# Patient Record
Sex: MALE | Race: WHITE | Employment: PART TIME | URBAN - METROPOLITAN AREA
[De-identification: names, ages, dates, MRNs, and addresses within clinical notes are randomized per-mention and may not be internally consistent; named-entity substitution may affect disease eponyms.]

---

## 2019-05-31 ENCOUNTER — HOSPITAL ENCOUNTER (EMERGENCY)
Facility: HOSPITAL | Age: 19
Discharge: HOME | End: 2019-05-31
Attending: PEDIATRICS
Payer: COMMERCIAL

## 2019-05-31 ENCOUNTER — APPOINTMENT (OUTPATIENT)
Dept: ULTRASOUND IMAGING | Facility: HOSPITAL | Age: 19
End: 2019-05-31
Attending: PEDIATRICS
Payer: COMMERCIAL

## 2019-05-31 VITALS
HEIGHT: 63 IN | WEIGHT: 140 LBS | SYSTOLIC BLOOD PRESSURE: 123 MMHG | RESPIRATION RATE: 20 BRPM | TEMPERATURE: 98 F | OXYGEN SATURATION: 100 % | HEART RATE: 88 BPM | BODY MASS INDEX: 24.8 KG/M2 | DIASTOLIC BLOOD PRESSURE: 86 MMHG

## 2019-05-31 DIAGNOSIS — G57.91 NEUROPATHY OF RIGHT LOWER EXTREMITY: Primary | ICD-10-CM

## 2019-05-31 PROCEDURE — 93971 EXTREMITY STUDY: CPT | Performed by: PEDIATRICS

## 2019-05-31 PROCEDURE — 99284 EMERGENCY DEPT VISIT MOD MDM: CPT

## 2019-05-31 NOTE — ED PROVIDER NOTES
Patient Seen in: [facility] Emergency Department    History   Patient presents with:  Deep Vein Thrombosis (cardiovascular)    Stated Complaint: RLE numbness/swelling- sent for rule out DVT from IC    HPI    Patient is a 69-year-old male here with c motion. CV: Chest is clear to auscultation, no wheezes rales or rhonchi. Cardiac exam normal S1-S2, no murmurs rubs or gallops. Abdomen: Soft, nontender, nondistended. Bowel sounds present throughout. Extremities: Warm and well perfused.   Dermatologic PMD and hopefully have much better glycemic control.   He will return to the ED for worsening symptoms              Disposition and Plan     Clinical Impression:  Neuropathy of right lower extremity  (primary encounter diagnosis)    Disposition:  Discharge

## 2020-07-01 ENCOUNTER — APPOINTMENT (OUTPATIENT)
Dept: GENERAL RADIOLOGY | Facility: HOSPITAL | Age: 20
DRG: 638 | End: 2020-07-01
Attending: EMERGENCY MEDICINE
Payer: COMMERCIAL

## 2020-07-01 ENCOUNTER — HOSPITAL ENCOUNTER (INPATIENT)
Facility: HOSPITAL | Age: 20
LOS: 2 days | Discharge: HOME | DRG: 638 | End: 2020-07-03
Attending: EMERGENCY MEDICINE | Admitting: HOSPITALIST
Payer: COMMERCIAL

## 2020-07-01 DIAGNOSIS — E10.10 DKA, TYPE 1, NOT AT GOAL (HCC): ICD-10-CM

## 2020-07-01 DIAGNOSIS — E87.2 METABOLIC ACIDOSIS: ICD-10-CM

## 2020-07-01 DIAGNOSIS — N17.9 AKI (ACUTE KIDNEY INJURY) (HCC): ICD-10-CM

## 2020-07-01 DIAGNOSIS — R73.9 HYPERGLYCEMIA: ICD-10-CM

## 2020-07-01 DIAGNOSIS — E10.10 TYPE 1 DIABETES MELLITUS WITH KETOACIDOSIS WITHOUT COMA (HCC): Primary | ICD-10-CM

## 2020-07-01 PROBLEM — E87.1 HYPONATREMIA: Status: ACTIVE | Noted: 2020-07-01

## 2020-07-01 PROBLEM — E87.20 METABOLIC ACIDOSIS: Status: ACTIVE | Noted: 2020-07-01

## 2020-07-01 LAB
ALBUMIN SERPL-MCNC: 4.5 G/DL (ref 3.4–5)
ALBUMIN/GLOB SERPL: 1 {RATIO} (ref 1–2)
ALP LIVER SERPL-CCNC: 96 U/L (ref 45–117)
ALT SERPL-CCNC: 22 U/L (ref 16–61)
ANION GAP SERPL CALC-SCNC: 12 MMOL/L (ref 0–18)
ANION GAP SERPL CALC-SCNC: 20 MMOL/L (ref 0–18)
AST SERPL-CCNC: 5 U/L (ref 15–37)
BASOPHILS # BLD AUTO: 0.05 X10(3) UL (ref 0–0.2)
BASOPHILS NFR BLD AUTO: 0.6 %
BILIRUB SERPL-MCNC: 0.7 MG/DL (ref 0.1–2)
BILIRUB UR QL STRIP.AUTO: NEGATIVE
BUN BLD-MCNC: 11 MG/DL (ref 7–18)
BUN BLD-MCNC: 13 MG/DL (ref 7–18)
BUN/CREAT SERPL: 10.5 (ref 10–20)
BUN/CREAT SERPL: 9.8 (ref 10–20)
CALCIUM BLD-MCNC: 8.4 MG/DL (ref 8.5–10.1)
CALCIUM BLD-MCNC: 9.4 MG/DL (ref 8.5–10.1)
CHLORIDE SERPL-SCNC: 108 MMOL/L (ref 98–112)
CHLORIDE SERPL-SCNC: 99 MMOL/L (ref 98–112)
CLARITY UR REFRACT.AUTO: CLEAR
CO2 SERPL-SCNC: 13 MMOL/L (ref 21–32)
CO2 SERPL-SCNC: 8 MMOL/L (ref 21–32)
CREAT BLD-MCNC: 1.05 MG/DL (ref 0.7–1.3)
CREAT BLD-MCNC: 1.33 MG/DL (ref 0.7–1.3)
CREAT UR-SCNC: 35.5 MG/DL
DEPRECATED RDW RBC AUTO: 39.7 FL (ref 35.1–46.3)
EOSINOPHIL # BLD AUTO: 0.02 X10(3) UL (ref 0–0.7)
EOSINOPHIL NFR BLD AUTO: 0.2 %
ERYTHROCYTE [DISTWIDTH] IN BLOOD BY AUTOMATED COUNT: 12.5 % (ref 11–15)
EST. AVERAGE GLUCOSE BLD GHB EST-MCNC: >424 MG/DL (ref 68–126)
GLOBULIN PLAS-MCNC: 4.6 G/DL (ref 2.8–4.4)
GLUCOSE BLD-MCNC: 207 MG/DL (ref 70–99)
GLUCOSE BLD-MCNC: 224 MG/DL (ref 70–99)
GLUCOSE BLD-MCNC: 291 MG/DL (ref 70–99)
GLUCOSE BLD-MCNC: 321 MG/DL (ref 70–99)
GLUCOSE BLD-MCNC: 323 MG/DL (ref 70–99)
GLUCOSE BLD-MCNC: 392 MG/DL (ref 70–99)
GLUCOSE BLD-MCNC: 397 MG/DL (ref 70–99)
GLUCOSE UR STRIP.AUTO-MCNC: >=500 MG/DL
HBA1C MFR BLD HPLC: >16.4 % (ref ?–5.7)
HCT VFR BLD AUTO: 55.7 % (ref 39–53)
HGB BLD-MCNC: 18.5 G/DL (ref 13–17.5)
IMM GRANULOCYTES # BLD AUTO: 0.07 X10(3) UL (ref 0–1)
IMM GRANULOCYTES NFR BLD: 0.8 %
KETONES UR STRIP.AUTO-MCNC: 80 MG/DL
LEUKOCYTE ESTERASE UR QL STRIP.AUTO: NEGATIVE
LIPASE SERPL-CCNC: 62 U/L (ref 73–393)
LYMPHOCYTES # BLD AUTO: 1.14 X10(3) UL (ref 1–4)
LYMPHOCYTES NFR BLD AUTO: 13.2 %
M PROTEIN MFR SERPL ELPH: 9.1 G/DL (ref 6.4–8.2)
MCH RBC QN AUTO: 29.1 PG (ref 26–34)
MCHC RBC AUTO-ENTMCNC: 33.2 G/DL (ref 31–37)
MCV RBC AUTO: 87.7 FL (ref 80–100)
MONOCYTES # BLD AUTO: 0.8 X10(3) UL (ref 0.1–1)
MONOCYTES NFR BLD AUTO: 9.2 %
NEUTROPHILS # BLD AUTO: 6.58 X10 (3) UL (ref 1.5–7.7)
NEUTROPHILS # BLD AUTO: 6.58 X10(3) UL (ref 1.5–7.7)
NEUTROPHILS NFR BLD AUTO: 76 %
NITRITE UR QL STRIP.AUTO: NEGATIVE
OSMOLALITY SERPL CALC.SUM OF ELEC: 280 MOSM/KG (ref 275–295)
OSMOLALITY SERPL CALC.SUM OF ELEC: 288 MOSM/KG (ref 275–295)
PH UR STRIP.AUTO: 5 [PH] (ref 4.5–8)
PLATELET # BLD AUTO: 250 10(3)UL (ref 150–450)
POTASSIUM SERPL-SCNC: 4 MMOL/L (ref 3.5–5.1)
POTASSIUM SERPL-SCNC: 4.4 MMOL/L (ref 3.5–5.1)
PROT UR STRIP.AUTO-MCNC: 100 MG/DL
RBC # BLD AUTO: 6.35 X10(6)UL (ref 4.3–5.7)
RBC UR QL AUTO: NEGATIVE
SARS-COV-2 RNA RESP QL NAA+PROBE: NOT DETECTED
SODIUM SERPL-SCNC: 127 MMOL/L (ref 136–145)
SODIUM SERPL-SCNC: 133 MMOL/L (ref 136–145)
SODIUM SERPL-SCNC: 41 MMOL/L
SP GR UR STRIP.AUTO: 1.02 (ref 1–1.03)
UROBILINOGEN UR STRIP.AUTO-MCNC: <2 MG/DL
VENOUS BASE EXCESS: -13.7
VENOUS BLOOD GAS HCO3: 11.7 MEQ/L (ref 23–27)
VENOUS O2 SAT CALC: 87 % (ref 72–78)
VENOUS O2 SATURATION: 93 % (ref 72–78)
VENOUS PCO2: 27 MM HG (ref 38–50)
VENOUS PH: 7.26 (ref 7.33–7.43)
VENOUS PO2: 66 MM HG (ref 30–50)
WBC # BLD AUTO: 8.7 X10(3) UL (ref 4–11)

## 2020-07-01 PROCEDURE — 71045 X-RAY EXAM CHEST 1 VIEW: CPT | Performed by: EMERGENCY MEDICINE

## 2020-07-01 PROCEDURE — 99291 CRITICAL CARE FIRST HOUR: CPT | Performed by: NURSE PRACTITIONER

## 2020-07-01 RX ORDER — ONDANSETRON 2 MG/ML
4 INJECTION INTRAMUSCULAR; INTRAVENOUS EVERY 6 HOURS PRN
Status: DISCONTINUED | OUTPATIENT
Start: 2020-07-01 | End: 2020-07-03

## 2020-07-01 RX ORDER — INSULIN ASPART 100 [IU]/ML
0.15 INJECTION, SOLUTION INTRAVENOUS; SUBCUTANEOUS ONCE
Status: COMPLETED | OUTPATIENT
Start: 2020-07-01 | End: 2020-07-01

## 2020-07-01 RX ORDER — SODIUM CHLORIDE 9 MG/ML
INJECTION, SOLUTION INTRAVENOUS CONTINUOUS
Status: DISCONTINUED | OUTPATIENT
Start: 2020-07-01 | End: 2020-07-01

## 2020-07-01 RX ORDER — SODIUM CHLORIDE 9 MG/ML
INJECTION, SOLUTION INTRAVENOUS CONTINUOUS
Status: DISCONTINUED | OUTPATIENT
Start: 2020-07-01 | End: 2020-07-02

## 2020-07-01 RX ORDER — HYDROMORPHONE HYDROCHLORIDE 1 MG/ML
0.5 INJECTION, SOLUTION INTRAMUSCULAR; INTRAVENOUS; SUBCUTANEOUS EVERY 30 MIN PRN
Status: DISCONTINUED | OUTPATIENT
Start: 2020-07-01 | End: 2020-07-01

## 2020-07-01 RX ORDER — DEXTROSE MONOHYDRATE 25 G/50ML
50 INJECTION, SOLUTION INTRAVENOUS
Status: DISCONTINUED | OUTPATIENT
Start: 2020-07-01 | End: 2020-07-03

## 2020-07-01 RX ORDER — METOCLOPRAMIDE HYDROCHLORIDE 5 MG/ML
10 INJECTION INTRAMUSCULAR; INTRAVENOUS EVERY 8 HOURS PRN
Status: DISCONTINUED | OUTPATIENT
Start: 2020-07-01 | End: 2020-07-03

## 2020-07-01 RX ORDER — ONDANSETRON 2 MG/ML
4 INJECTION INTRAMUSCULAR; INTRAVENOUS EVERY 4 HOURS PRN
Status: DISCONTINUED | OUTPATIENT
Start: 2020-07-01 | End: 2020-07-01

## 2020-07-01 RX ORDER — ACETAMINOPHEN 325 MG/1
650 TABLET ORAL EVERY 6 HOURS PRN
Status: DISCONTINUED | OUTPATIENT
Start: 2020-07-01 | End: 2020-07-03

## 2020-07-01 NOTE — ED PROVIDER NOTES
Patient Seen in: [facility] Emergency Department      History   Patient presents with:  Abdomen/Flank Pain  Nausea/Vomiting/Diarrhea    Stated Complaint: abd pain, n/v     HPI    53-year-old type I diabetic who presents to the emergency department c icterus or conjunctival pallor: Neck is supple without tenderness on palpation. Head is atraumatic normocephalic. Oral mucosa is dry. Tongue is midline. Right upper gumline is inflamed but airway is patent. Lungs: Clear to auscultation bilaterally. DIFFERENTIAL WITH PLATELET    Narrative: The following orders were created for panel order CBC WITH DIFFERENTIAL WITH PLATELET.   Procedure                               Abnormality         Status                     --------- insulin per protocol. Laboratories were sent to assess for diabetic ketoacidosis. Chest x-ray performed to assess for any pneumonia. Chest x-ray showed no acute abnormality seen. Glucose was 392 with a sodium 127. CO2 of 8 and anion gap of 20.   Ezekiel Amen

## 2020-07-01 NOTE — ED INITIAL ASSESSMENT (HPI)
Pt stated he started having pain to right side of jaw/gum pain, pt stated he vomited last night. Pt stated he used OTC numbing medication for right gum/mouth pain.  Pt stating he does not have dental insurance and is suppose to have tooth extracted but pat

## 2020-07-02 LAB
ALBUMIN SERPL-MCNC: 3.3 G/DL (ref 3.4–5)
ALBUMIN/GLOB SERPL: 1 {RATIO} (ref 1–2)
ALP LIVER SERPL-CCNC: 63 U/L (ref 45–117)
ALT SERPL-CCNC: 17 U/L (ref 16–61)
ANION GAP SERPL CALC-SCNC: 13 MMOL/L (ref 0–18)
ANION GAP SERPL CALC-SCNC: 13 MMOL/L (ref 0–18)
ANION GAP SERPL CALC-SCNC: 14 MMOL/L (ref 0–18)
ANION GAP SERPL CALC-SCNC: 14 MMOL/L (ref 0–18)
AST SERPL-CCNC: 8 U/L (ref 15–37)
ATRIAL RATE: 119 BPM
BILIRUB SERPL-MCNC: 0.4 MG/DL (ref 0.1–2)
BUN BLD-MCNC: 11 MG/DL (ref 7–18)
BUN BLD-MCNC: 12 MG/DL (ref 7–18)
BUN BLD-MCNC: 12 MG/DL (ref 7–18)
BUN BLD-MCNC: 14 MG/DL (ref 7–18)
BUN/CREAT SERPL: 12.4 (ref 10–20)
BUN/CREAT SERPL: 12.5 (ref 10–20)
BUN/CREAT SERPL: 12.5 (ref 10–20)
BUN/CREAT SERPL: 13.9 (ref 10–20)
CALCIUM BLD-MCNC: 8.2 MG/DL (ref 8.5–10.1)
CALCIUM BLD-MCNC: 8.4 MG/DL (ref 8.5–10.1)
CALCIUM BLD-MCNC: 8.4 MG/DL (ref 8.5–10.1)
CALCIUM BLD-MCNC: 8.5 MG/DL (ref 8.5–10.1)
CHLORIDE SERPL-SCNC: 109 MMOL/L (ref 98–112)
CHLORIDE SERPL-SCNC: 111 MMOL/L (ref 98–112)
CO2 SERPL-SCNC: 14 MMOL/L (ref 21–32)
CREAT BLD-MCNC: 0.89 MG/DL (ref 0.7–1.3)
CREAT BLD-MCNC: 0.96 MG/DL (ref 0.7–1.3)
CREAT BLD-MCNC: 0.96 MG/DL (ref 0.7–1.3)
CREAT BLD-MCNC: 1.01 MG/DL (ref 0.7–1.3)
DEPRECATED RDW RBC AUTO: 39.6 FL (ref 35.1–46.3)
ERYTHROCYTE [DISTWIDTH] IN BLOOD BY AUTOMATED COUNT: 12.6 % (ref 11–15)
GLOBULIN PLAS-MCNC: 3.2 G/DL (ref 2.8–4.4)
GLUCOSE BLD-MCNC: 188 MG/DL (ref 70–99)
GLUCOSE BLD-MCNC: 227 MG/DL (ref 70–99)
GLUCOSE BLD-MCNC: 232 MG/DL (ref 70–99)
GLUCOSE BLD-MCNC: 232 MG/DL (ref 70–99)
GLUCOSE BLD-MCNC: 234 MG/DL (ref 70–99)
GLUCOSE BLD-MCNC: 247 MG/DL (ref 70–99)
GLUCOSE BLD-MCNC: 269 MG/DL (ref 70–99)
GLUCOSE BLD-MCNC: 296 MG/DL (ref 70–99)
HAV IGM SER QL: 1.9 MG/DL (ref 1.6–2.6)
HCT VFR BLD AUTO: 45.6 % (ref 39–53)
HGB BLD-MCNC: 15.3 G/DL (ref 13–17.5)
M PROTEIN MFR SERPL ELPH: 6.5 G/DL (ref 6.4–8.2)
MCH RBC QN AUTO: 29 PG (ref 26–34)
MCHC RBC AUTO-ENTMCNC: 33.6 G/DL (ref 31–37)
MCV RBC AUTO: 86.5 FL (ref 80–100)
OSMOLALITY SERPL CALC.SUM OF ELEC: 291 MOSM/KG (ref 275–295)
OSMOLALITY SERPL CALC.SUM OF ELEC: 291 MOSM/KG (ref 275–295)
OSMOLALITY SERPL CALC.SUM OF ELEC: 292 MOSM/KG (ref 275–295)
OSMOLALITY SERPL CALC.SUM OF ELEC: 294 MOSM/KG (ref 275–295)
P AXIS: 80 DEGREES
P-R INTERVAL: 124 MS
PLATELET # BLD AUTO: 207 10(3)UL (ref 150–450)
POTASSIUM SERPL-SCNC: 3.6 MMOL/L (ref 3.5–5.1)
POTASSIUM SERPL-SCNC: 3.7 MMOL/L (ref 3.5–5.1)
POTASSIUM SERPL-SCNC: 3.8 MMOL/L (ref 3.5–5.1)
Q-T INTERVAL: 320 MS
QRS DURATION: 92 MS
QTC CALCULATION (BEZET): 450 MS
R AXIS: 80 DEGREES
RBC # BLD AUTO: 5.27 X10(6)UL (ref 4.3–5.7)
SODIUM SERPL-SCNC: 136 MMOL/L (ref 136–145)
SODIUM SERPL-SCNC: 137 MMOL/L (ref 136–145)
SODIUM SERPL-SCNC: 137 MMOL/L (ref 136–145)
SODIUM SERPL-SCNC: 138 MMOL/L (ref 136–145)
T AXIS: 61 DEGREES
VENTRICULAR RATE: 119 BPM
WBC # BLD AUTO: 7.1 X10(3) UL (ref 4–11)

## 2020-07-02 PROCEDURE — 99233 SBSQ HOSP IP/OBS HIGH 50: CPT | Performed by: CLINICAL NURSE SPECIALIST

## 2020-07-02 RX ORDER — BLOOD-GLUCOSE METER
EACH MISCELLANEOUS
Qty: 1 KIT | Refills: 0 | Status: SHIPPED | OUTPATIENT
Start: 2020-07-02 | End: 2020-07-06 | Stop reason: CLARIF

## 2020-07-02 RX ORDER — LANCETS
1 EACH MISCELLANEOUS
Qty: 100 EACH | Refills: 2 | Status: SHIPPED | OUTPATIENT
Start: 2020-07-02 | End: 2020-07-06 | Stop reason: CLARIF

## 2020-07-02 RX ORDER — AMOXICILLIN AND CLAVULANATE POTASSIUM 875; 125 MG/1; MG/1
1 TABLET, FILM COATED ORAL EVERY 12 HOURS SCHEDULED
Status: DISCONTINUED | OUTPATIENT
Start: 2020-07-02 | End: 2020-07-03

## 2020-07-02 RX ORDER — POTASSIUM CHLORIDE 20 MEQ/1
40 TABLET, EXTENDED RELEASE ORAL EVERY 4 HOURS
Status: COMPLETED | OUTPATIENT
Start: 2020-07-02 | End: 2020-07-02

## 2020-07-02 RX ORDER — BLOOD-GLUCOSE METER
EACH MISCELLANEOUS
Qty: 1 DEVICE | Refills: 0 | Status: SHIPPED | OUTPATIENT
Start: 2020-07-02 | End: 2020-07-06 | Stop reason: CLARIF

## 2020-07-02 RX ORDER — BLOOD SUGAR DIAGNOSTIC
STRIP MISCELLANEOUS
Qty: 100 STRIP | Refills: 2 | Status: SHIPPED | OUTPATIENT
Start: 2020-07-02 | End: 2020-07-06 | Stop reason: CLARIF

## 2020-07-02 NOTE — PAYOR COMM NOTE
--------------  ADMISSION REVIEW     Payor: Michelle [facility]  Subscriber #:  [id number]  Authorization Number: [id number]    Admit date: 7/1/20  Admit time: 2016       Admitting Physician: Everette Sanchez MD  Attending Physician:  Everette Sanchez MD limits   LIPASE - Abnormal; Notable for the following components:    Lipase 62 (*)     All other components within normal limits   POCT GLUCOSE - Abnormal; Notable for the following components:    POC Glucose 397 (*)     All other components within normal Yes    Hyponatremia E87.1 6/2/4940 Yes    Metabolic acidosis B86.4 1/3/1883 Yes               7/2/20 -       07/02/20 0800 98.3 °F (36.8 °C) 90 17 118/67 98 % — None (Room air) — KW   07/02/20 0700 — 83 17 122/61 97 % — — — PB   07/02/20 0600 — 83 15 113/6

## 2020-07-02 NOTE — H&P
.  CC: Patient presents with:  Abdomen/Flank Pain  Nausea/Vomiting/Diarrhea       PCP: Sheryle La, MD    History of Present Illness: Patient is a 22year old male with PMH sig for DM I who is  typically on lantus and regular insulin but ran out of the la 110/60   Pulse 83   Temp 99 °F (37.2 °C)   Resp 16   Ht 162.6 cm (5' 4.02\")   Wt 122 lb 9.2 oz (55.6 kg)   SpO2 97%   BMI 21.03 kg/m²     Gen- NAD, appears stated age  HEENT- NCAT, anicteric sclera, unable to visualize affected upper molar area  Lymph- no and hilar contours are normal.  No focal consolidation. CONCLUSION:  No acute abnormality is seen. If clinical symptoms persist consider followup radiographs or CT.    Dictated by: Edward Wright MD on 7/01/2020 at 4:30 PM     Finalized by: Edward Wright MD on 7

## 2020-07-02 NOTE — CM/SW NOTE
CM spoke with bedside RN regarding dental referrals. Dental referrals have been sent to the ICU. SAULO also spoke with Dr Roseann Forbes regarding potential post discharge follow up plans. Patient currently has medical insurance but no dental insurance.  Oral surger

## 2020-07-02 NOTE — DIETARY NOTE
[facility]     Admitting diagnosis:  Type 1 diabetes mellitus with ketoacidosis without coma ([facility] [E10.10]    Ht: 162.6 cm (5' 4.02\")  Wt: 55.6 kg (122 lb 9.2 oz).  This is 94% of IBW  Body mass index is 21.03

## 2020-07-02 NOTE — CONSULTS
[facility]  Report of Consultation    Shawnee Lazcano Patient Status:  Inpatient    2000 MRN [id number]   [facility] 4SW-A Attending Muna Ward MD   Hosp Day # 1 PCP Blank Gomez MD     Reason for Consultation:  Oral pain     H tablet, Oral, Q15 Min PRN **OR** dextrose 50 % injection 50 mL, 50 mL, Intravenous, Q15 Min PRN **OR** glucose (DEX4) oral liquid 30 g, 30 g, Oral, Q15 Min PRN **OR** Glucose-Vitamin C (DEX-4) chewable tab 8 tablet, 8 tablet, Oral, Q15 Min PRN  •  Insulin follow up in the office for extraction of tooth # 2, and possibly # 1      Roland Esqueda  7/2/2020  6:51 PM

## 2020-07-02 NOTE — CONSULTS
ENDOCRINOLOGY CONSULTATION    Attending physician:  Melisa Eugene MD  Consulting physican:  Ulises Ballesteros MD    Admission Date:  7/1/2020  Consultation Date:  7/2/2020      Reason for consultation: Mgmt of Type 1 DM    Chief Complaint:  Admitted fo 875-125 MG tab 1 tablet, 1 tablet, Oral, 2 times per day  · Potassium Chloride ER (K-DUR M20) CR tab 40 mEq, 40 mEq, Oral, Q4H  · [COMPLETED] sodium chloride 0.9% IV bolus 1,000 mL, 1,000 mL, Intravenous, Once  · [COMPLETED] insulin aspart (NOVOLOG) 100 UN Drug use: Never        Family History   Problem Relation Age of Onset   • Hypertension Mother    • Hypertension Maternal Grandmother    • Diabetes Maternal Grandmother    • Cancer Maternal Grandmother    • Lipids Maternal Grandmother    • Stroke Maternal G 109   Carbon Dioxide, Total      21.0 - 32.0 mmol/L 14.0 (L) 14.0 (L) 14.0 (L) 14.0 (L)     Component      Latest Ref Rng & Units 7/1/2020 7/1/2020           8:58 PM  3:59 PM   Glucose      70 - 99 mg/dL 323 (H) 392 (H)   Sodium      136 - 145 mmol/L 133 (

## 2020-07-02 NOTE — ED NOTES
Telephone report given to [facility], RN. Report included ED summary, vitals, labs. Pt has IV access. Belongings will go with patient.  RN will escort patient to inpatient bed 464

## 2020-07-02 NOTE — CONSULTS
[facility]  Diabetes Consult Note    Bob Lubin Patient Status:  Inpatient    2000 MRN [id number]   [facility] 4SW-A Attending Akil Lugo MD   Hosp Day # 1 PCP Emily Cabral MD     Reason for Consult:     Recommendations f Assessment/Recommendations: The patient reports he was diagnosed with type 1 diabetes in 2015. It was life changing for him because he had hoped to enlist in the [employer] for a career and was told he would not be able to do so with type 1 diabetes. rather use a blood glucometer. Taught the One Touch Verio Flex and provided prescription. However, if this glucometer is not covered, provided a handout for the Relion glucometer at [place] and he stated he would try to get one.       The patient asked ab monitoring  · Patient to administer subcutaneously insulin injection with insulin pen under nursing supervision once return demonstration has verified patient's skill  · RN to reinforce survival skills:  · Provide \"Understanding Diabetes: Basic Survival S

## 2020-07-02 NOTE — PROGRESS NOTES
ICU  Critical Care APRN Progress Note    NAME: Miah Saldivar - ROOM: [address] [place] [address] - MRN: [id number] - Age: 22year old - :2000    History Of Present Illness:  Miah Saldivar is a 22year old male with PMHx significant for T1DM dx in .   Patient pr Maternal Grandmother    • Cancer Maternal Grandmother    • Lipids Maternal Grandmother    • Stroke Maternal Grandmother    • Other (atrial flutter) Maternal Grandmother    • Hypertension Maternal Grandfather    • Lipids Maternal Grandfather    • Cancer Mat consult  -Check VBG now  -Follow BMP q4h for now  -Insulin per Endo  -Continue IVF  -Accuchecks   -Check Hgb A1c  -Diabetic CNS consult and education    2. JOSUE--2/2 dehydration  -IVF  -Follow renal indices  -Check urine Na and Cr    3.   Dental infection--

## 2020-07-02 NOTE — PLAN OF CARE
Received pt from the ED A&Ox4 on RA. Saturations wnl. Upon arrival pt Sinus Tachycardia on monitor, but quickly resolved to NSR. Vitals stable. C/o of right gum/tooth pain. Refused pain meds at this time.  Pt stated had right-sided tooth pain for 2-3 days,

## 2020-07-02 NOTE — PROGRESS NOTES
Received patient alert and oriented  No complaints of pain  NSR on monitor  Lungs clear bilaterally  Oral surgery on consult for right jaw/tooth pain  Up to bathroom with standby assist  Will monitor.

## 2020-07-02 NOTE — CONSULTS
Pulmonary H&P/Consult       NAME: Mandy Benavides - ROOM: [address] [place] [address] - MRN: [id number] - Age: 22year old - :  2000    Date of Admission: 2020  3:27 PM  Admission Diagnosis: Type 1 diabetes mellitus with ketoacidosis without coma ([facility] [E10.10]  R per session: 3 or 4        Binge frequency: Never      Drug use: Never      Sexual activity: Not on file    Lifestyle      Physical activity:        Days per week: Not on file        Minutes per session: Not on file      Stress: Not on file    Relationship Glucose-Vitamin C     REVIEW OF SYSTEMS:   GENERAL:  feels well otherwise   SKIN:  denies any unusual skin lesions   EYES:  denies blurred vision or double vision   HEENT:  denies nasal congestion, sinus pain/tenderness  RESPIRATORY:  denies SOB, cough, dy carotid    bruit or JVD   Back:     Symmetric, no curvature, ROM normal, no CVA tenderness   Lungs:     Clear to auscultation bilaterally, respirations unlabored   Chest wall:    No tenderness or deformity   Heart:    Regular rate and rhythm, S1 and S2 nor

## 2020-07-03 VITALS
WEIGHT: 122.13 LBS | DIASTOLIC BLOOD PRESSURE: 64 MMHG | OXYGEN SATURATION: 98 % | TEMPERATURE: 98 F | HEIGHT: 64.02 IN | RESPIRATION RATE: 16 BRPM | SYSTOLIC BLOOD PRESSURE: 109 MMHG | BODY MASS INDEX: 20.85 KG/M2 | HEART RATE: 80 BPM

## 2020-07-03 LAB
ANION GAP SERPL CALC-SCNC: 7 MMOL/L (ref 0–18)
BUN BLD-MCNC: 10 MG/DL (ref 7–18)
BUN/CREAT SERPL: 13.3 (ref 10–20)
CALCIUM BLD-MCNC: 8.2 MG/DL (ref 8.5–10.1)
CHLORIDE SERPL-SCNC: 108 MMOL/L (ref 98–112)
CO2 SERPL-SCNC: 23 MMOL/L (ref 21–32)
CREAT BLD-MCNC: 0.75 MG/DL (ref 0.7–1.3)
GLUCOSE BLD-MCNC: 155 MG/DL (ref 70–99)
GLUCOSE BLD-MCNC: 174 MG/DL (ref 70–99)
GLUCOSE BLD-MCNC: 175 MG/DL (ref 70–99)
OSMOLALITY SERPL CALC.SUM OF ELEC: 289 MOSM/KG (ref 275–295)
POTASSIUM SERPL-SCNC: 3.3 MMOL/L (ref 3.5–5.1)
POTASSIUM SERPL-SCNC: 4.1 MMOL/L (ref 3.5–5.1)
SODIUM SERPL-SCNC: 138 MMOL/L (ref 136–145)

## 2020-07-03 PROCEDURE — 99231 SBSQ HOSP IP/OBS SF/LOW 25: CPT | Performed by: CLINICAL NURSE SPECIALIST

## 2020-07-03 RX ORDER — AMOXICILLIN AND CLAVULANATE POTASSIUM 875; 125 MG/1; MG/1
1 TABLET, FILM COATED ORAL EVERY 12 HOURS SCHEDULED
Qty: 20 TABLET | Refills: 0 | Status: SHIPPED | OUTPATIENT
Start: 2020-07-03 | End: 2020-07-03

## 2020-07-03 RX ORDER — INSULIN LISPRO 100 [IU]/ML
1 INJECTION, SOLUTION INTRAVENOUS; SUBCUTANEOUS
Qty: 1 CARTRIDGE | Refills: 0 | Status: SHIPPED | OUTPATIENT
Start: 2020-07-03 | End: 2020-07-16

## 2020-07-03 RX ORDER — FLASH GLUCOSE SENSOR
KIT MISCELLANEOUS
Qty: 1 DEVICE | Refills: 1 | Status: SHIPPED | OUTPATIENT
Start: 2020-07-03 | End: 2020-07-06 | Stop reason: CLARIF

## 2020-07-03 RX ORDER — INSULIN LISPRO 100 [IU]/ML
1 INJECTION, SOLUTION INTRAVENOUS; SUBCUTANEOUS 4 TIMES DAILY
Qty: 1 PEN | Refills: 0 | Status: SHIPPED | OUTPATIENT
Start: 2020-07-03 | End: 2020-07-03

## 2020-07-03 RX ORDER — FLASH GLUCOSE SENSOR
KIT MISCELLANEOUS
Qty: 2 EACH | Refills: 2 | Status: SHIPPED | OUTPATIENT
Start: 2020-07-03 | End: 2020-07-03

## 2020-07-03 RX ORDER — POTASSIUM CHLORIDE 20 MEQ/1
40 TABLET, EXTENDED RELEASE ORAL EVERY 4 HOURS
Status: COMPLETED | OUTPATIENT
Start: 2020-07-03 | End: 2020-07-03

## 2020-07-03 RX ORDER — INSULIN LISPRO 100 [IU]/ML
INJECTION, SOLUTION INTRAVENOUS; SUBCUTANEOUS
Qty: 1 PEN | Refills: 0 | Status: SHIPPED | OUTPATIENT
Start: 2020-07-03 | End: 2020-07-06 | Stop reason: CLARIF

## 2020-07-03 RX ORDER — AMOXICILLIN AND CLAVULANATE POTASSIUM 875; 125 MG/1; MG/1
1 TABLET, FILM COATED ORAL EVERY 12 HOURS SCHEDULED
Qty: 20 TABLET | Refills: 0 | Status: SHIPPED | OUTPATIENT
Start: 2020-07-03 | End: 2020-07-13

## 2020-07-03 RX ORDER — INSULIN LISPRO 100 [IU]/ML
1 INJECTION, SOLUTION INTRAVENOUS; SUBCUTANEOUS
Qty: 1 PEN | Refills: 1 | Status: SHIPPED | OUTPATIENT
Start: 2020-07-03 | End: 2020-07-03

## 2020-07-03 RX ORDER — FLASH GLUCOSE SENSOR
KIT MISCELLANEOUS
Qty: 2 EACH | Refills: 2 | Status: SHIPPED | OUTPATIENT
Start: 2020-07-03 | End: 2020-07-13 | Stop reason: ALTCHOICE

## 2020-07-03 RX ORDER — INSULIN LISPRO 100 [IU]/ML
1 INJECTION, SOLUTION INTRAVENOUS; SUBCUTANEOUS
Qty: 1 CARTRIDGE | Refills: 0 | Status: SHIPPED | OUTPATIENT
Start: 2020-07-03 | End: 2020-07-03

## 2020-07-03 NOTE — PROGRESS NOTES
Pulmonary Progress Note        NAME: Afshan Nicole - ROOM: 532/967-D - MRN: [id number] - Age: 22year old - : 2000        Last 24hrs: No events overnight    OBJECTIVE:   20  0400 20  0500 20  0600 20  0800   BP: 114/76 111/6

## 2020-07-03 NOTE — DISCHARGE SUMMARY
General Medicine Discharge Summary     Patient ID:  Yohan Chou  22year old  8/5/2000    Admit date: 7/1/2020    Discharge date and time: 7/3/20    Attending Physician: Susan Arvizu DO DKA.  - discussed with CM.  Seen by oral surgery, dc on augmentin and follow up next week for extraction     3) rakan- resolved     Consults: IP CONSULT TO PULMONOLOGY  IP CONSULT TO HOSPITALIST  IP CONSULT TO ENDOCRINOLOGY  IP CONSULT TO SOCIAL WORK  IP CONS of hypoglycemia; bedtime    !! ReliOn Lancets Standard 21G Does not apply Misc  1 lancet by Does not apply route TID CC and HS. !! - Potential duplicate medications found. Please discuss with provider.       CONTINUE these medications which have NOT OVALLES

## 2020-07-03 NOTE — PROGRESS NOTES
[facility]  Diabetes Follow Up      Allie Horan  [id number]       Patient seen and evaluated; states he has a headache, but is ready to go home.     Recent Labs   Lab 07/02/20  1127 07/02/20  1704 07/02/20  2112 07/03/20  0750 07/03/20  123 program has been provided during COVID-19 and registration can be done over the phone. The patient reported part of his struggle with income has to do with COVID-19 reducing his work hours.   Explained these programs are taking these circumstances into con level 2; PA required    PROVIDER F/U RECOMMENDATIONS:    · Patient's current PCP  · Endocrinologist    A total of 51 minutes were spent with the patient, 100% was spent counseling and coordinating care for uncontrolled type 1 diabetes/DKA self-management i

## 2020-07-03 NOTE — PLAN OF CARE
Received pt resting in bed alert and oriented x 4. Pt on RA. VSs wnl. Lungs clear bilaterally to auscultation. Pt denies any pain. Pt taught how to self-administer insulin. Pt correctly completes return demonstration of insulin admin.  Pt ambulates to bathr

## 2020-07-03 NOTE — PROGRESS NOTES
NURSING DISCHARGE NOTE    Discharged Home via Ambulatory. Accompanied by Family member  Belongings Taken by patient/family. Reviewed all discharge instructions with patient. Went over pricing of all insulins,glucometer with patient.   Patient voice

## 2020-07-03 NOTE — CM/SW NOTE
I got a call for this pt from Laura Menendez for script on freestyle Pao sensor system (Continuous Glucose monitoring system) for discharge.  I called pt's mother and found out pt gets his medication filled at [facility] in [place], ([phone]), thus script

## 2020-07-03 NOTE — PROGRESS NOTES
[facility]  Progress Note    Paresh Briones Patient Status:  Inpatient    2000 MRN [id number]   [facility] 4SW-A Attending Nicki Childs MD   Hosp Day # 2 PCP Olu Stiles MD       Assessment/Plan:20 yo with DM admitted for DKA Value Ref Range    Potassium 3.7 3.5 - 5.1 mmol/L   POCT GLUCOSE    Collection Time: 07/02/20  5:04 PM   Result Value Ref Range    POC Glucose 188 (H) 70 - 99 mg/dL   POCT GLUCOSE    Collection Time: 07/02/20  9:12 PM   Result Value Ref Range    POC Glucos

## 2020-07-06 ENCOUNTER — PATIENT OUTREACH (OUTPATIENT)
Dept: CASE MANAGEMENT | Age: 20
End: 2020-07-06

## 2020-07-06 ENCOUNTER — VIRTUAL PHONE E/M (OUTPATIENT)
Dept: INTERNAL MEDICINE CLINIC | Facility: CLINIC | Age: 20
End: 2020-07-06
Payer: COMMERCIAL

## 2020-07-06 DIAGNOSIS — K04.7 DENTAL INFECTION: ICD-10-CM

## 2020-07-06 DIAGNOSIS — E10.10 DKA, TYPE 1, NOT AT GOAL (HCC): Primary | ICD-10-CM

## 2020-07-06 DIAGNOSIS — N17.9 AKI (ACUTE KIDNEY INJURY) (HCC): ICD-10-CM

## 2020-07-06 DIAGNOSIS — Z02.9 ENCOUNTERS FOR UNSPECIFIED ADMINISTRATIVE PURPOSE: ICD-10-CM

## 2020-07-06 PROCEDURE — 99442 PHONE E/M BY PHYS 11-20 MIN: CPT | Performed by: CLINICAL NURSE SPECIALIST

## 2020-07-06 NOTE — PROGRESS NOTES
Virtual/Telephone Check-In  AUDIO ONLY    Yusuf Neal verbally consents to a Virtual/Telephone Check-In service on 07/06/20.   Patient understands and accepts financial responsibility for any deductible, co-insurance and/or co-pays associated with this

## 2020-07-06 NOTE — PROGRESS NOTES
Spoke with Pt to give scheduled apt times:    Future Appointments   Date Time Provider Byron Coats   7/13/2020 12:30 PM Ja Lindo MD OGDN[facility] NPV   7/16/2020  9:40 AM JAMIE Vaughn Dr. Thursday 7/09

## 2020-07-06 NOTE — PROGRESS NOTES
TRANSITIONAL CARE CLINIC PHARMACIST MEDICATION RECONCILIATION        Marlys Pak MRN [id number]    2000 PCP Etta Jeff MD       Comments: Medication history completed by the [address] [place] [address] Pharmacist with the patient on the phone. his Freestyle Apo sensor got caught on his sleeve yesterday and fell off. He states he is currently using the One Touch Verio meter and strips that were given to him in the hospital prior to discharge.   He states he will be able to  the new Frees

## 2020-07-09 NOTE — PAYOR COMM NOTE
--------------  DISCHARGE REVIEW    Payor: [address] [employer]  Subscriber #:  [id number]  Authorization Number: [id number]    Admit date: 7/1/20  Admit time:  2016  Discharge Date: 7/3/2020  4:32 PM     Admitting Physician: Jitendra Leiva MD  Primary Ca

## 2021-07-09 ENCOUNTER — HOSPITAL ENCOUNTER (EMERGENCY)
Facility: HOSPITAL | Age: 21
Discharge: HOME | End: 2021-07-10
Attending: EMERGENCY MEDICINE
Payer: COMMERCIAL

## 2021-07-09 DIAGNOSIS — R73.9 HYPERGLYCEMIA: Primary | ICD-10-CM

## 2021-07-09 LAB
BASOPHILS # BLD AUTO: 0.03 X10(3) UL (ref 0–0.2)
BASOPHILS NFR BLD AUTO: 0.5 %
DEPRECATED RDW RBC AUTO: 35.6 FL (ref 35.1–46.3)
EOSINOPHIL # BLD AUTO: 0.28 X10(3) UL (ref 0–0.7)
EOSINOPHIL NFR BLD AUTO: 4.6 %
ERYTHROCYTE [DISTWIDTH] IN BLOOD BY AUTOMATED COUNT: 11.7 % (ref 11–15)
GLUCOSE BLD-MCNC: 406 MG/DL (ref 70–99)
HCT VFR BLD AUTO: 45.2 %
HGB BLD-MCNC: 16.1 G/DL
IMM GRANULOCYTES # BLD AUTO: 0.01 X10(3) UL (ref 0–1)
IMM GRANULOCYTES NFR BLD: 0.2 %
LYMPHOCYTES # BLD AUTO: 2.34 X10(3) UL (ref 1–4)
LYMPHOCYTES NFR BLD AUTO: 38.6 %
MCH RBC QN AUTO: 30.3 PG (ref 26–34)
MCHC RBC AUTO-ENTMCNC: 35.6 G/DL (ref 31–37)
MCV RBC AUTO: 85.1 FL
MONOCYTES # BLD AUTO: 0.46 X10(3) UL (ref 0.1–1)
MONOCYTES NFR BLD AUTO: 7.6 %
NEUTROPHILS # BLD AUTO: 2.95 X10 (3) UL (ref 1.5–7.7)
NEUTROPHILS # BLD AUTO: 2.95 X10(3) UL (ref 1.5–7.7)
NEUTROPHILS NFR BLD AUTO: 48.5 %
PLATELET # BLD AUTO: 243 10(3)UL (ref 150–450)
RBC # BLD AUTO: 5.31 X10(6)UL
VENOUS BASE EXCESS: -7.2
VENOUS BLOOD GAS HCO3: 17.2 MEQ/L (ref 23–27)
VENOUS O2 SAT CALC: 91 % (ref 72–78)
VENOUS O2 SATURATION: 91 % (ref 72–78)
VENOUS PCO2: 32 MM HG (ref 38–50)
VENOUS PH: 7.35 (ref 7.33–7.43)
VENOUS PO2: 66 MM HG (ref 30–50)
WBC # BLD AUTO: 6.1 X10(3) UL (ref 4–11)

## 2021-07-09 PROCEDURE — 82962 GLUCOSE BLOOD TEST: CPT

## 2021-07-09 PROCEDURE — 96360 HYDRATION IV INFUSION INIT: CPT

## 2021-07-09 PROCEDURE — 80053 COMPREHEN METABOLIC PANEL: CPT | Performed by: EMERGENCY MEDICINE

## 2021-07-09 PROCEDURE — 82803 BLOOD GASES ANY COMBINATION: CPT | Performed by: EMERGENCY MEDICINE

## 2021-07-09 PROCEDURE — 99284 EMERGENCY DEPT VISIT MOD MDM: CPT

## 2021-07-09 PROCEDURE — 96361 HYDRATE IV INFUSION ADD-ON: CPT

## 2021-07-09 PROCEDURE — 85025 COMPLETE CBC W/AUTO DIFF WBC: CPT | Performed by: EMERGENCY MEDICINE

## 2021-07-09 RX ORDER — INSULIN ASPART 100 [IU]/ML
0.2 INJECTION, SOLUTION INTRAVENOUS; SUBCUTANEOUS ONCE
Status: COMPLETED | OUTPATIENT
Start: 2021-07-09 | End: 2021-07-09

## 2021-07-10 VITALS
BODY MASS INDEX: 24.8 KG/M2 | DIASTOLIC BLOOD PRESSURE: 79 MMHG | SYSTOLIC BLOOD PRESSURE: 113 MMHG | WEIGHT: 140 LBS | HEIGHT: 63 IN | RESPIRATION RATE: 18 BRPM | OXYGEN SATURATION: 98 % | TEMPERATURE: 98 F | HEART RATE: 80 BPM

## 2021-07-10 LAB
ALBUMIN SERPL-MCNC: 4.2 G/DL (ref 3.4–5)
ALBUMIN/GLOB SERPL: 1.5 {RATIO} (ref 1–2)
ALP LIVER SERPL-CCNC: 71 U/L
ALT SERPL-CCNC: 22 U/L
ANION GAP SERPL CALC-SCNC: 13 MMOL/L (ref 0–18)
AST SERPL-CCNC: 18 U/L (ref 15–37)
BILIRUB SERPL-MCNC: 0.4 MG/DL (ref 0.1–2)
BUN BLD-MCNC: 19 MG/DL (ref 7–18)
BUN/CREAT SERPL: 17 (ref 10–20)
CALCIUM BLD-MCNC: 8.5 MG/DL (ref 8.5–10.1)
CHLORIDE SERPL-SCNC: 101 MMOL/L (ref 98–112)
CO2 SERPL-SCNC: 18 MMOL/L (ref 21–32)
CREAT BLD-MCNC: 1.12 MG/DL
GLOBULIN PLAS-MCNC: 2.8 G/DL (ref 2.8–4.4)
GLUCOSE BLD-MCNC: 217 MG/DL (ref 70–99)
GLUCOSE BLD-MCNC: 388 MG/DL (ref 70–99)
M PROTEIN MFR SERPL ELPH: 7 G/DL (ref 6.4–8.2)
OSMOLALITY SERPL CALC.SUM OF ELEC: 292 MOSM/KG (ref 275–295)
POTASSIUM SERPL-SCNC: 3.9 MMOL/L (ref 3.5–5.1)
SODIUM SERPL-SCNC: 132 MMOL/L (ref 136–145)

## 2021-07-10 PROCEDURE — 82962 GLUCOSE BLOOD TEST: CPT

## 2021-07-10 NOTE — ED PROVIDER NOTES
Patient Seen in: [facility] Emergency Department      History   Patient presents with:  Hyperglycemia  Nausea/Vomiting/Diarrhea    Stated Complaint: nausea/vomiting, blood sugar >400, been off insulin x 1 month due to insurance *    HPI/Subjective: BMI 24.80 kg/m²         Physical Exam   General: This a pleasant nontoxic appearing patient in no apparent distress alert and oriented ×3  HEENT: Pupils are equal reactive to light. Extra ocular motions are intact.   No scleral icterus or conjunctival pal Final result                 Please view results for these tests on the individual orders. RAINBOW DRAW LAVENDER   RAINBOW DRAW LIGHT GREEN   RAINBOW DRAW GOLD   CBC W/ DIFFERENTIAL                   MDM      The patient had laboratories that were sent.

## 2021-07-10 NOTE — CM/SW NOTE
COLETTE had phone conversation with patient regarding his insulin price. Patient initially stated that his insulin cost $476/month but later revealed that was the out of pocket price cash. With his insurance the cost is around $40/month.   I advised him to [facility]

## 2022-01-23 NOTE — CM/SW NOTE
SW received generic MDO for home assessments/ social support. Per chart review, Romana Ames CM spoke with the pt regarding dental referrals. SW received call from [address] [place] [address] this morning regarding if SW could speak with the pt.  Per SAULO, after speaking the R warm warm warm warm

## 2022-01-30 ENCOUNTER — HOSPITAL ENCOUNTER (EMERGENCY)
Facility: HOSPITAL | Age: 22
Discharge: HOME | End: 2022-01-30
Attending: EMERGENCY MEDICINE
Payer: COMMERCIAL

## 2022-01-30 ENCOUNTER — APPOINTMENT (OUTPATIENT)
Dept: GENERAL RADIOLOGY | Facility: HOSPITAL | Age: 22
End: 2022-01-30
Attending: EMERGENCY MEDICINE
Payer: COMMERCIAL

## 2022-01-30 VITALS
SYSTOLIC BLOOD PRESSURE: 118 MMHG | OXYGEN SATURATION: 97 % | HEART RATE: 90 BPM | BODY MASS INDEX: 23.39 KG/M2 | HEIGHT: 63 IN | DIASTOLIC BLOOD PRESSURE: 79 MMHG | TEMPERATURE: 98 F | RESPIRATION RATE: 11 BRPM | WEIGHT: 132 LBS

## 2022-01-30 DIAGNOSIS — E10.65 TYPE 1 DIABETES MELLITUS WITH HYPERGLYCEMIA (HCC): Primary | ICD-10-CM

## 2022-01-30 DIAGNOSIS — Z91.14 HISTORY OF MEDICATION NONCOMPLIANCE: ICD-10-CM

## 2022-01-30 LAB
ACETONE: NEGATIVE
ALBUMIN SERPL-MCNC: 3.5 G/DL (ref 3.4–5)
ALBUMIN/GLOB SERPL: 1.3 {RATIO} (ref 1–2)
ALP LIVER SERPL-CCNC: 67 U/L
ANION GAP SERPL CALC-SCNC: 9 MMOL/L (ref 0–18)
ARTERIAL PATENCY WRIST A: POSITIVE
AST SERPL-CCNC: 15 U/L (ref 15–37)
BASE EXCESS BLDA CALC-SCNC: -2.3 MMOL/L (ref ?–2)
BASOPHILS # BLD AUTO: 0.04 X10(3) UL (ref 0–0.2)
BASOPHILS NFR BLD AUTO: 0.9 %
BILIRUB SERPL-MCNC: 0.5 MG/DL (ref 0.1–2)
BILIRUB UR QL STRIP.AUTO: NEGATIVE
BODY TEMPERATURE: 98.6 F
BUN BLD-MCNC: 14 MG/DL (ref 7–18)
CA-I BLD-SCNC: 1.23 MMOL/L (ref 1.12–1.32)
CALCIUM BLD-MCNC: 8.9 MG/DL (ref 8.5–10.1)
CHLORIDE SERPL-SCNC: 104 MMOL/L (ref 98–112)
CLARITY UR REFRACT.AUTO: CLEAR
CO2 SERPL-SCNC: 25 MMOL/L (ref 21–32)
COHGB MFR BLD: 1.1 % SAT (ref 0–3)
COLOR UR AUTO: YELLOW
CREAT BLD-MCNC: 0.7 MG/DL
EOSINOPHIL # BLD AUTO: 0.3 X10(3) UL (ref 0–0.7)
EOSINOPHIL NFR BLD AUTO: 7 %
ERYTHROCYTE [DISTWIDTH] IN BLOOD BY AUTOMATED COUNT: 11.9 %
FIO2: 21 %
GLOBULIN PLAS-MCNC: 2.7 G/DL (ref 2.8–4.4)
GLUCOSE BLD-MCNC: 207 MG/DL (ref 70–99)
GLUCOSE BLD-MCNC: 309 MG/DL (ref 70–99)
GLUCOSE UR STRIP.AUTO-MCNC: >=500 MG/DL
HCO3 BLDA-SCNC: 22.8 MEQ/L (ref 22–26)
HCT VFR BLD AUTO: 41.2 %
HGB BLD-MCNC: 14.7 G/DL
HGB BLD-MCNC: 14.9 G/DL
IMM GRANULOCYTES # BLD AUTO: 0.01 X10(3) UL (ref 0–1)
IMM GRANULOCYTES NFR BLD: 0.2 %
KETONES UR STRIP.AUTO-MCNC: 80 MG/DL
LACTATE BLDA-SCNC: <1.6 MMOL/L (ref 0.5–2)
LEUKOCYTE ESTERASE UR QL STRIP.AUTO: NEGATIVE
LIPASE SERPL-CCNC: 94 U/L (ref 73–393)
LYMPHOCYTES # BLD AUTO: 1.7 X10(3) UL (ref 1–4)
LYMPHOCYTES NFR BLD AUTO: 39.5 %
MCH RBC QN AUTO: 30.6 PG (ref 26–34)
MCHC RBC AUTO-ENTMCNC: 35.7 G/DL (ref 31–37)
MCV RBC AUTO: 85.8 FL
METHGB MFR BLD: 0.6 % SAT (ref 0.4–1.5)
MONOCYTES # BLD AUTO: 0.39 X10(3) UL (ref 0.1–1)
MONOCYTES NFR BLD AUTO: 9.1 %
NEUTROPHILS # BLD AUTO: 1.86 X10 (3) UL (ref 1.5–7.7)
NEUTROPHILS # BLD AUTO: 1.86 X10(3) UL (ref 1.5–7.7)
NEUTROPHILS NFR BLD AUTO: 43.3 %
NITRITE UR QL STRIP.AUTO: NEGATIVE
OSMOLALITY SERPL CALC.SUM OF ELEC: 298 MOSM/KG (ref 275–295)
P/F RATIO: 453.8 MMHG
PCO2 BLDA: 40 MM HG (ref 35–45)
PH UR STRIP.AUTO: 6 [PH] (ref 5–8)
PLATELET # BLD AUTO: 222 10(3)UL (ref 150–450)
PO2 BLDA: 95 MM HG (ref 80–105)
POTASSIUM BLD-SCNC: 4 MMOL/L (ref 3.6–5.1)
POTASSIUM SERPL-SCNC: 4 MMOL/L (ref 3.5–5.1)
PROT SERPL-MCNC: 6.2 G/DL (ref 6.4–8.2)
PROT UR STRIP.AUTO-MCNC: NEGATIVE MG/DL
RBC # BLD AUTO: 4.8 X10(6)UL
RBC UR QL AUTO: NEGATIVE
SAO2 % BLDA FROM PO2: 97 % (ref 92–100)
SAO2 % BLDA: 96 % (ref 92–100)
SARS-COV-2 RNA RESP QL NAA+PROBE: NOT DETECTED
SODIUM BLD-SCNC: 141 MMOL/L (ref 136–144)
SODIUM SERPL-SCNC: 138 MMOL/L (ref 136–145)
SP GR UR STRIP.AUTO: >1.03 (ref 1–1.03)
UROBILINOGEN UR STRIP.AUTO-MCNC: <2 MG/DL
WBC # BLD AUTO: 4.3 X10(3) UL (ref 4–11)

## 2022-01-30 PROCEDURE — 36600 WITHDRAWAL OF ARTERIAL BLOOD: CPT | Performed by: EMERGENCY MEDICINE

## 2022-01-30 PROCEDURE — 82375 ASSAY CARBOXYHB QUANT: CPT | Performed by: EMERGENCY MEDICINE

## 2022-01-30 PROCEDURE — 85018 HEMOGLOBIN: CPT | Performed by: EMERGENCY MEDICINE

## 2022-01-30 PROCEDURE — 84132 ASSAY OF SERUM POTASSIUM: CPT | Performed by: EMERGENCY MEDICINE

## 2022-01-30 PROCEDURE — 80053 COMPREHEN METABOLIC PANEL: CPT | Performed by: EMERGENCY MEDICINE

## 2022-01-30 PROCEDURE — 81003 URINALYSIS AUTO W/O SCOPE: CPT | Performed by: EMERGENCY MEDICINE

## 2022-01-30 PROCEDURE — 71045 X-RAY EXAM CHEST 1 VIEW: CPT | Performed by: EMERGENCY MEDICINE

## 2022-01-30 PROCEDURE — 99285 EMERGENCY DEPT VISIT HI MDM: CPT

## 2022-01-30 PROCEDURE — 85025 COMPLETE CBC W/AUTO DIFF WBC: CPT | Performed by: EMERGENCY MEDICINE

## 2022-01-30 PROCEDURE — 83605 ASSAY OF LACTIC ACID: CPT | Performed by: EMERGENCY MEDICINE

## 2022-01-30 PROCEDURE — 99284 EMERGENCY DEPT VISIT MOD MDM: CPT

## 2022-01-30 PROCEDURE — 83690 ASSAY OF LIPASE: CPT | Performed by: EMERGENCY MEDICINE

## 2022-01-30 PROCEDURE — 82330 ASSAY OF CALCIUM: CPT | Performed by: EMERGENCY MEDICINE

## 2022-01-30 PROCEDURE — 82803 BLOOD GASES ANY COMBINATION: CPT | Performed by: EMERGENCY MEDICINE

## 2022-01-30 PROCEDURE — 82009 KETONE BODYS QUAL: CPT | Performed by: EMERGENCY MEDICINE

## 2022-01-30 PROCEDURE — 84295 ASSAY OF SERUM SODIUM: CPT | Performed by: EMERGENCY MEDICINE

## 2022-01-30 PROCEDURE — 96360 HYDRATION IV INFUSION INIT: CPT

## 2022-01-30 PROCEDURE — 83050 HGB METHEMOGLOBIN QUAN: CPT | Performed by: EMERGENCY MEDICINE

## 2022-01-30 PROCEDURE — 82962 GLUCOSE BLOOD TEST: CPT

## 2022-01-30 RX ORDER — SYRING-NEEDL,DISP,INSUL,0.3 ML 29 G X1/2"
SYRINGE, EMPTY DISPOSABLE MISCELLANEOUS
Qty: 1 EACH | Refills: 0 | Status: SHIPPED | OUTPATIENT
Start: 2022-01-30

## 2022-01-30 RX ORDER — INSULIN ASPART 100 [IU]/ML
0.15 INJECTION, SOLUTION INTRAVENOUS; SUBCUTANEOUS ONCE
Status: COMPLETED | OUTPATIENT
Start: 2022-01-30 | End: 2022-01-30

## 2022-01-30 RX ORDER — SODIUM CHLORIDE 9 MG/ML
1000 INJECTION, SOLUTION INTRAVENOUS ONCE
Status: COMPLETED | OUTPATIENT
Start: 2022-01-30 | End: 2022-01-30

## 2022-01-30 NOTE — ED INITIAL ASSESSMENT (HPI)
Pt presents to the ED with complaints with left sided kidney pain that started on Wednesday. Pt is DMI and has not had insulin for a year. Pt reports his blood sugar this morning was 362.

## 2022-04-17 ENCOUNTER — HOSPITAL ENCOUNTER (OUTPATIENT)
Age: 22
Discharge: HOME | End: 2022-04-17
Payer: COMMERCIAL

## 2022-04-17 VITALS
BODY MASS INDEX: 22.53 KG/M2 | OXYGEN SATURATION: 98 % | RESPIRATION RATE: 18 BRPM | HEIGHT: 64 IN | SYSTOLIC BLOOD PRESSURE: 149 MMHG | DIASTOLIC BLOOD PRESSURE: 89 MMHG | HEART RATE: 89 BPM | WEIGHT: 132 LBS | TEMPERATURE: 99 F

## 2022-04-17 DIAGNOSIS — E10.628 TYPE 1 DIABETES MELLITUS WITH OTHER SKIN COMPLICATION (HCC): ICD-10-CM

## 2022-04-17 DIAGNOSIS — L03.012 FELON OF FINGER OF LEFT HAND: Primary | ICD-10-CM

## 2022-04-17 DIAGNOSIS — R73.9 HYPERGLYCEMIA: ICD-10-CM

## 2022-04-17 LAB — GLUCOSE BLD-MCNC: 265 MG/DL (ref 70–99)

## 2022-04-17 PROCEDURE — 90471 IMMUNIZATION ADMIN: CPT | Performed by: PHYSICIAN ASSISTANT

## 2022-04-17 PROCEDURE — 99214 OFFICE O/P EST MOD 30 MIN: CPT | Performed by: PHYSICIAN ASSISTANT

## 2022-04-17 PROCEDURE — 90715 TDAP VACCINE 7 YRS/> IM: CPT | Performed by: PHYSICIAN ASSISTANT

## 2022-04-17 PROCEDURE — 82962 GLUCOSE BLOOD TEST: CPT | Performed by: PHYSICIAN ASSISTANT

## 2022-04-17 RX ORDER — CLINDAMYCIN HYDROCHLORIDE 300 MG/1
300 CAPSULE ORAL 3 TIMES DAILY
Qty: 30 CAPSULE | Refills: 0 | Status: SHIPPED | OUTPATIENT
Start: 2022-04-17 | End: 2022-04-27

## 2022-04-17 RX ORDER — INSULIN GLARGINE-YFGN 100 [IU]/ML
25 INJECTION, SOLUTION SUBCUTANEOUS NIGHTLY
Qty: 7.5 ML | Refills: 0 | Status: SHIPPED | OUTPATIENT
Start: 2022-04-17

## 2022-04-17 NOTE — ED INITIAL ASSESSMENT (HPI)
Patient is a Type I diabetic. Has been without Insulin x one month, started up again three days ago. Puncture like wound to left index finger x one month. Patient has been using a Lancet to open wound and drain pus. Today increased pain, redness & swelling. Denies fever.

## 2022-10-21 PROCEDURE — 82962 GLUCOSE BLOOD TEST: CPT

## 2022-10-21 PROCEDURE — 99284 EMERGENCY DEPT VISIT MOD MDM: CPT | Performed by: EMERGENCY MEDICINE

## 2022-10-21 PROCEDURE — 93010 ELECTROCARDIOGRAM REPORT: CPT | Performed by: EMERGENCY MEDICINE

## 2022-10-22 ENCOUNTER — HOSPITAL ENCOUNTER (EMERGENCY)
Facility: HOSPITAL | Age: 22
Discharge: HOME | End: 2022-10-22
Attending: EMERGENCY MEDICINE
Payer: COMMERCIAL

## 2022-10-22 VITALS
OXYGEN SATURATION: 98 % | RESPIRATION RATE: 16 BRPM | DIASTOLIC BLOOD PRESSURE: 85 MMHG | TEMPERATURE: 99 F | HEART RATE: 98 BPM | SYSTOLIC BLOOD PRESSURE: 115 MMHG

## 2022-10-22 DIAGNOSIS — R73.9 HYPERGLYCEMIA: Primary | ICD-10-CM

## 2022-10-22 LAB
ALBUMIN SERPL-MCNC: 4.2 G/DL (ref 3.4–5)
ALBUMIN/GLOB SERPL: 1.1 {RATIO} (ref 1–2)
ALP LIVER SERPL-CCNC: 89 U/L
ALT SERPL-CCNC: 24 U/L
ANION GAP SERPL CALC-SCNC: 14 MMOL/L (ref 0–18)
AST SERPL-CCNC: 5 U/L (ref 15–37)
BASOPHILS # BLD AUTO: 0.05 X10(3) UL (ref 0–0.2)
BASOPHILS NFR BLD AUTO: 0.6 %
BILIRUB SERPL-MCNC: 0.5 MG/DL (ref 0.1–2)
BUN BLD-MCNC: 16 MG/DL (ref 7–18)
CALCIUM BLD-MCNC: 9.9 MG/DL (ref 8.5–10.1)
CHLORIDE SERPL-SCNC: 98 MMOL/L (ref 98–112)
CO2 SERPL-SCNC: 18 MMOL/L (ref 21–32)
CREAT BLD-MCNC: 1.28 MG/DL
EOSINOPHIL # BLD AUTO: 0.27 X10(3) UL (ref 0–0.7)
EOSINOPHIL NFR BLD AUTO: 3.4 %
ERYTHROCYTE [DISTWIDTH] IN BLOOD BY AUTOMATED COUNT: 11.7 %
GFR SERPLBLD BASED ON 1.73 SQ M-ARVRAT: 80 ML/MIN/1.73M2 (ref 60–?)
GLOBULIN PLAS-MCNC: 3.9 G/DL (ref 2.8–4.4)
GLUCOSE BLD-MCNC: 358 MG/DL (ref 70–99)
GLUCOSE BLD-MCNC: 397 MG/DL (ref 70–99)
GLUCOSE BLD-MCNC: 491 MG/DL (ref 70–99)
GLUCOSE BLD-MCNC: 547 MG/DL (ref 70–99)
GLUCOSE BLD-MCNC: 570 MG/DL (ref 70–99)
HCT VFR BLD AUTO: 49.1 %
HGB BLD-MCNC: 17.5 G/DL
IMM GRANULOCYTES # BLD AUTO: 0.06 X10(3) UL (ref 0–1)
IMM GRANULOCYTES NFR BLD: 0.8 %
LYMPHOCYTES # BLD AUTO: 2.71 X10(3) UL (ref 1–4)
LYMPHOCYTES NFR BLD AUTO: 34.6 %
MCH RBC QN AUTO: 30.8 PG (ref 26–34)
MCHC RBC AUTO-ENTMCNC: 35.6 G/DL (ref 31–37)
MCV RBC AUTO: 86.3 FL
MONOCYTES # BLD AUTO: 0.84 X10(3) UL (ref 0.1–1)
MONOCYTES NFR BLD AUTO: 10.7 %
NEUTROPHILS # BLD AUTO: 3.91 X10 (3) UL (ref 1.5–7.7)
NEUTROPHILS # BLD AUTO: 3.91 X10(3) UL (ref 1.5–7.7)
NEUTROPHILS NFR BLD AUTO: 49.9 %
OSMOLALITY SERPL CALC.SUM OF ELEC: 297 MOSM/KG (ref 275–295)
PLATELET # BLD AUTO: 333 10(3)UL (ref 150–450)
POTASSIUM SERPL-SCNC: 4.3 MMOL/L (ref 3.5–5.1)
PROT SERPL-MCNC: 8.1 G/DL (ref 6.4–8.2)
RBC # BLD AUTO: 5.69 X10(6)UL
SODIUM SERPL-SCNC: 130 MMOL/L (ref 136–145)
WBC # BLD AUTO: 7.8 X10(3) UL (ref 4–11)

## 2022-10-22 PROCEDURE — 96360 HYDRATION IV INFUSION INIT: CPT | Performed by: EMERGENCY MEDICINE

## 2022-10-22 PROCEDURE — 85025 COMPLETE CBC W/AUTO DIFF WBC: CPT | Performed by: EMERGENCY MEDICINE

## 2022-10-22 PROCEDURE — 80053 COMPREHEN METABOLIC PANEL: CPT | Performed by: EMERGENCY MEDICINE

## 2022-10-22 PROCEDURE — 82962 GLUCOSE BLOOD TEST: CPT

## 2022-10-22 PROCEDURE — 93005 ELECTROCARDIOGRAM TRACING: CPT

## 2022-10-22 RX ORDER — INSULIN GLARGINE 100 [IU]/ML
INJECTION, SOLUTION SUBCUTANEOUS
Qty: 10 ML | Refills: 0 | Status: SHIPPED | OUTPATIENT
Start: 2022-10-22

## 2022-10-22 NOTE — ED INITIAL ASSESSMENT (HPI)
Patient to the ER c/o hyperglycemia. Patient is a type 1 diabetic. Patient is c/o headaches and dizziness. No n/v no diarrhea no fevers. Patient also reports heart palpitations.  547 glucose

## 2022-10-23 LAB
ATRIAL RATE: 102 BPM
P AXIS: 79 DEGREES
P-R INTERVAL: 134 MS
Q-T INTERVAL: 342 MS
QRS DURATION: 88 MS
QTC CALCULATION (BEZET): 445 MS
R AXIS: 70 DEGREES
T AXIS: 55 DEGREES
VENTRICULAR RATE: 102 BPM

## 2023-01-07 ENCOUNTER — APPOINTMENT (OUTPATIENT)
Dept: GENERAL RADIOLOGY | Age: 23
End: 2023-01-07
Attending: NURSE PRACTITIONER
Payer: COMMERCIAL

## 2023-01-07 ENCOUNTER — HOSPITAL ENCOUNTER (OUTPATIENT)
Age: 23
Discharge: HOME | End: 2023-01-07
Payer: COMMERCIAL

## 2023-01-07 VITALS
TEMPERATURE: 98 F | HEART RATE: 100 BPM | HEIGHT: 63.5 IN | DIASTOLIC BLOOD PRESSURE: 99 MMHG | SYSTOLIC BLOOD PRESSURE: 151 MMHG | OXYGEN SATURATION: 96 % | RESPIRATION RATE: 14 BRPM | BODY MASS INDEX: 23 KG/M2

## 2023-01-07 DIAGNOSIS — S69.92XA INJURY OF FINGER OF LEFT HAND, INITIAL ENCOUNTER: ICD-10-CM

## 2023-01-07 DIAGNOSIS — L03.012 CELLULITIS OF FINGER OF LEFT HAND: Primary | ICD-10-CM

## 2023-01-07 DIAGNOSIS — E10.65 UNCONTROLLED TYPE 1 DIABETES MELLITUS WITH HYPERGLYCEMIA (HCC): ICD-10-CM

## 2023-01-07 LAB
#MXD IC: 0.7 X10ˆ3/UL (ref 0.1–1)
BUN BLD-MCNC: 17 MG/DL (ref 7–18)
CHLORIDE BLD-SCNC: 101 MMOL/L (ref 98–112)
CO2 BLD-SCNC: 19 MMOL/L (ref 21–32)
CREAT BLD-MCNC: 0.8 MG/DL
GFR SERPLBLD BASED ON 1.73 SQ M-ARVRAT: 127 ML/MIN/1.73M2 (ref 60–?)
GLUCOSE BLD-MCNC: 340 MG/DL (ref 70–99)
GLUCOSE BLD-MCNC: 384 MG/DL (ref 70–99)
HCT VFR BLD AUTO: 45.9 %
HCT VFR BLD CALC: 49 %
HGB BLD-MCNC: 15.8 G/DL
ISTAT IONIZED CALCIUM FOR CHEM 8: 1.24 MMOL/L (ref 1.12–1.32)
LYMPHOCYTES # BLD AUTO: 3.2 X10ˆ3/UL (ref 1–4)
LYMPHOCYTES NFR BLD AUTO: 34.8 %
MCH RBC QN AUTO: 30 PG (ref 26–34)
MCHC RBC AUTO-ENTMCNC: 34.4 G/DL (ref 31–37)
MCV RBC AUTO: 87.3 FL (ref 80–100)
MIXED CELL %: 8.2 %
NEUTROPHILS # BLD AUTO: 5.2 X10ˆ3/UL (ref 1.5–7.7)
NEUTROPHILS NFR BLD AUTO: 57 %
PLATELET # BLD AUTO: 245 X10ˆ3/UL (ref 150–450)
POTASSIUM BLD-SCNC: 4.2 MMOL/L (ref 3.6–5.1)
RBC # BLD AUTO: 5.26 X10ˆ6/UL
SODIUM BLD-SCNC: 132 MMOL/L (ref 136–145)
WBC # BLD AUTO: 9.1 X10ˆ3/UL (ref 4–11)

## 2023-01-07 PROCEDURE — 80047 BASIC METABLC PNL IONIZED CA: CPT | Performed by: NURSE PRACTITIONER

## 2023-01-07 PROCEDURE — 82962 GLUCOSE BLOOD TEST: CPT | Performed by: NURSE PRACTITIONER

## 2023-01-07 PROCEDURE — 73140 X-RAY EXAM OF FINGER(S): CPT | Performed by: NURSE PRACTITIONER

## 2023-01-07 PROCEDURE — 99214 OFFICE O/P EST MOD 30 MIN: CPT | Performed by: NURSE PRACTITIONER

## 2023-01-07 PROCEDURE — 85025 COMPLETE CBC W/AUTO DIFF WBC: CPT | Performed by: NURSE PRACTITIONER

## 2023-01-07 RX ORDER — DOXYCYCLINE HYCLATE 100 MG/1
100 CAPSULE ORAL 2 TIMES DAILY
Qty: 14 CAPSULE | Refills: 0 | Status: SHIPPED | OUTPATIENT
Start: 2023-01-07 | End: 2023-01-14

## 2023-01-07 NOTE — DISCHARGE INSTRUCTIONS
Please take insulin as prescribed. Doxycycline for infection. Warm compresses and Tylenol. Follow closely with your primary in 3 days for wound check. ER if worse.

## 2023-01-07 NOTE — ED INITIAL ASSESSMENT (HPI)
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 Swelling and redness to tip of finger. Pain only with pressure.

## 2023-03-02 ENCOUNTER — LAB ENCOUNTER (OUTPATIENT)
Dept: LAB | Age: 23
End: 2023-03-02
Attending: INTERNAL MEDICINE
Payer: COMMERCIAL

## 2023-03-02 ENCOUNTER — TELEPHONE (OUTPATIENT)
Dept: INTERNAL MEDICINE CLINIC | Facility: CLINIC | Age: 23
End: 2023-03-02

## 2023-03-02 ENCOUNTER — OFFICE VISIT (OUTPATIENT)
Dept: INTERNAL MEDICINE CLINIC | Facility: CLINIC | Age: 23
End: 2023-03-02
Payer: COMMERCIAL

## 2023-03-02 VITALS
RESPIRATION RATE: 12 BRPM | DIASTOLIC BLOOD PRESSURE: 62 MMHG | HEART RATE: 70 BPM | HEIGHT: 63.5 IN | WEIGHT: 125.63 LBS | OXYGEN SATURATION: 98 % | BODY MASS INDEX: 21.98 KG/M2 | SYSTOLIC BLOOD PRESSURE: 118 MMHG

## 2023-03-02 DIAGNOSIS — Z13.0 SCREENING FOR BLOOD DISEASE: ICD-10-CM

## 2023-03-02 DIAGNOSIS — Z13.220 SCREENING FOR LIPID DISORDERS: ICD-10-CM

## 2023-03-02 DIAGNOSIS — Z13.29 SCREENING FOR THYROID DISORDER: ICD-10-CM

## 2023-03-02 DIAGNOSIS — E10.65 UNCONTROLLED TYPE 1 DIABETES MELLITUS WITH HYPERGLYCEMIA (HCC): ICD-10-CM

## 2023-03-02 DIAGNOSIS — E10.65 UNCONTROLLED TYPE 1 DIABETES MELLITUS WITH HYPERGLYCEMIA (HCC): Primary | ICD-10-CM

## 2023-03-02 DIAGNOSIS — Z13.228 SCREENING FOR METABOLIC DISORDER: ICD-10-CM

## 2023-03-02 DIAGNOSIS — Z00.00 LABORATORY EXAMINATION ORDERED AS PART OF A COMPLETE PHYSICAL EXAMINATION: ICD-10-CM

## 2023-03-02 LAB
ALBUMIN SERPL-MCNC: 3.9 G/DL (ref 3.4–5)
ALBUMIN/GLOB SERPL: 1.3 {RATIO} (ref 1–2)
ALP LIVER SERPL-CCNC: 94 U/L
ALT SERPL-CCNC: 18 U/L
ANION GAP SERPL CALC-SCNC: 10 MMOL/L (ref 0–18)
AST SERPL-CCNC: 5 U/L (ref 15–37)
BASOPHILS # BLD AUTO: 0.04 X10(3) UL (ref 0–0.2)
BASOPHILS NFR BLD AUTO: 0.8 %
BILIRUB SERPL-MCNC: 0.6 MG/DL (ref 0.1–2)
BUN BLD-MCNC: 14 MG/DL (ref 7–18)
CALCIUM BLD-MCNC: 8.7 MG/DL (ref 8.5–10.1)
CHLORIDE SERPL-SCNC: 104 MMOL/L (ref 98–112)
CHOLEST SERPL-MCNC: 266 MG/DL (ref ?–200)
CO2 SERPL-SCNC: 19 MMOL/L (ref 21–32)
CREAT BLD-MCNC: 0.79 MG/DL
CREAT UR-SCNC: 13.6 MG/DL
EOSINOPHIL # BLD AUTO: 0.27 X10(3) UL (ref 0–0.7)
EOSINOPHIL NFR BLD AUTO: 5.1 %
ERYTHROCYTE [DISTWIDTH] IN BLOOD BY AUTOMATED COUNT: 12.2 %
EST. AVERAGE GLUCOSE BLD GHB EST-MCNC: 415 MG/DL (ref 68–126)
FASTING PATIENT LIPID ANSWER: YES
FASTING STATUS PATIENT QL REPORTED: YES
GFR SERPLBLD BASED ON 1.73 SQ M-ARVRAT: 127 ML/MIN/1.73M2 (ref 60–?)
GLOBULIN PLAS-MCNC: 2.9 G/DL (ref 2.8–4.4)
GLUCOSE BLD-MCNC: 454 MG/DL (ref 70–99)
HBA1C MFR BLD: 16.1 % (ref ?–5.7)
HCT VFR BLD AUTO: 43 %
HDLC SERPL-MCNC: 51 MG/DL (ref 40–59)
HGB BLD-MCNC: 14.8 G/DL
IMM GRANULOCYTES # BLD AUTO: 0.01 X10(3) UL (ref 0–1)
IMM GRANULOCYTES NFR BLD: 0.2 %
LDLC SERPL CALC-MCNC: 182 MG/DL (ref ?–100)
LYMPHOCYTES # BLD AUTO: 2.26 X10(3) UL (ref 1–4)
LYMPHOCYTES NFR BLD AUTO: 42.9 %
MCH RBC QN AUTO: 30.2 PG (ref 26–34)
MCHC RBC AUTO-ENTMCNC: 34.4 G/DL (ref 31–37)
MCV RBC AUTO: 87.8 FL
MICROALBUMIN UR-MCNC: <0.5 MG/DL
MONOCYTES # BLD AUTO: 0.55 X10(3) UL (ref 0.1–1)
MONOCYTES NFR BLD AUTO: 10.4 %
NEUTROPHILS # BLD AUTO: 2.14 X10 (3) UL (ref 1.5–7.7)
NEUTROPHILS # BLD AUTO: 2.14 X10(3) UL (ref 1.5–7.7)
NEUTROPHILS NFR BLD AUTO: 40.6 %
NONHDLC SERPL-MCNC: 215 MG/DL (ref ?–130)
OSMOLALITY SERPL CALC.SUM OF ELEC: 296 MOSM/KG (ref 275–295)
PLATELET # BLD AUTO: 234 10(3)UL (ref 150–450)
POTASSIUM SERPL-SCNC: 3.6 MMOL/L (ref 3.5–5.1)
PROT SERPL-MCNC: 6.8 G/DL (ref 6.4–8.2)
RBC # BLD AUTO: 4.9 X10(6)UL
SODIUM SERPL-SCNC: 133 MMOL/L (ref 136–145)
TRIGL SERPL-MCNC: 176 MG/DL (ref 30–149)
TSI SER-ACNC: 3.18 MIU/ML (ref 0.36–3.74)
VLDLC SERPL CALC-MCNC: 36 MG/DL (ref 0–30)
WBC # BLD AUTO: 5.3 X10(3) UL (ref 4–11)

## 2023-03-02 PROCEDURE — 3078F DIAST BP <80 MM HG: CPT | Performed by: INTERNAL MEDICINE

## 2023-03-02 PROCEDURE — 3008F BODY MASS INDEX DOCD: CPT | Performed by: INTERNAL MEDICINE

## 2023-03-02 PROCEDURE — 80061 LIPID PANEL: CPT | Performed by: INTERNAL MEDICINE

## 2023-03-02 PROCEDURE — 99203 OFFICE O/P NEW LOW 30 MIN: CPT | Performed by: INTERNAL MEDICINE

## 2023-03-02 PROCEDURE — 80050 GENERAL HEALTH PANEL: CPT | Performed by: INTERNAL MEDICINE

## 2023-03-02 PROCEDURE — 83036 HEMOGLOBIN GLYCOSYLATED A1C: CPT | Performed by: INTERNAL MEDICINE

## 2023-03-02 PROCEDURE — 82570 ASSAY OF URINE CREATININE: CPT | Performed by: INTERNAL MEDICINE

## 2023-03-02 PROCEDURE — 3074F SYST BP LT 130 MM HG: CPT | Performed by: INTERNAL MEDICINE

## 2023-03-02 PROCEDURE — 3046F HEMOGLOBIN A1C LEVEL >9.0%: CPT | Performed by: INTERNAL MEDICINE

## 2023-03-02 PROCEDURE — 82043 UR ALBUMIN QUANTITATIVE: CPT | Performed by: INTERNAL MEDICINE

## 2023-03-02 RX ORDER — INSULIN GLARGINE 100 [IU]/ML
INJECTION, SOLUTION SUBCUTANEOUS
Qty: 10 ML | Refills: 0 | Status: SHIPPED | OUTPATIENT
Start: 2023-03-02

## 2023-03-02 NOTE — TELEPHONE ENCOUNTER
Spoke to Rogelio with [facility] lab. Pt's BG is 454. Routing to AD for review and recs.

## 2023-04-19 ENCOUNTER — OFFICE VISIT (OUTPATIENT)
Dept: ENDOCRINOLOGY CLINIC | Facility: CLINIC | Age: 23
End: 2023-04-19
Payer: COMMERCIAL

## 2023-04-19 ENCOUNTER — TELEPHONE (OUTPATIENT)
Dept: ENDOCRINOLOGY CLINIC | Facility: CLINIC | Age: 23
End: 2023-04-19

## 2023-04-19 VITALS
RESPIRATION RATE: 16 BRPM | HEART RATE: 68 BPM | SYSTOLIC BLOOD PRESSURE: 116 MMHG | OXYGEN SATURATION: 98 % | DIASTOLIC BLOOD PRESSURE: 74 MMHG | BODY MASS INDEX: 24 KG/M2 | WEIGHT: 135.19 LBS

## 2023-04-19 DIAGNOSIS — E78.5 DYSLIPIDEMIA: ICD-10-CM

## 2023-04-19 DIAGNOSIS — E10.65 TYPE 1 DIABETES MELLITUS WITH HYPERGLYCEMIA (HCC): Primary | ICD-10-CM

## 2023-04-19 PROBLEM — E10.10 DKA, TYPE 1, NOT AT GOAL (HCC): Status: RESOLVED | Noted: 2020-07-01 | Resolved: 2023-04-19

## 2023-04-19 PROBLEM — E10.10 TYPE 1 DIABETES MELLITUS WITH KETOACIDOSIS WITHOUT COMA (HCC): Status: RESOLVED | Noted: 2020-07-01 | Resolved: 2023-04-19

## 2023-04-19 PROBLEM — E87.20 METABOLIC ACIDOSIS: Status: RESOLVED | Noted: 2020-07-01 | Resolved: 2023-04-19

## 2023-04-19 PROBLEM — R73.9 HYPERGLYCEMIA: Status: RESOLVED | Noted: 2020-07-01 | Resolved: 2023-04-19

## 2023-04-19 PROBLEM — E87.1 HYPONATREMIA: Status: RESOLVED | Noted: 2020-07-01 | Resolved: 2023-04-19

## 2023-04-19 LAB
GLUCOSE BLOOD: 304
TEST STRIP LOT #: NORMAL NUMERIC

## 2023-04-19 PROCEDURE — 82947 ASSAY GLUCOSE BLOOD QUANT: CPT | Performed by: NURSE PRACTITIONER

## 2023-04-19 PROCEDURE — 99215 OFFICE O/P EST HI 40 MIN: CPT | Performed by: NURSE PRACTITIONER

## 2023-04-19 PROCEDURE — 3078F DIAST BP <80 MM HG: CPT | Performed by: NURSE PRACTITIONER

## 2023-04-19 PROCEDURE — 3074F SYST BP LT 130 MM HG: CPT | Performed by: NURSE PRACTITIONER

## 2023-04-19 RX ORDER — INSULIN DEGLUDEC 200 U/ML
INJECTION, SOLUTION SUBCUTANEOUS
Qty: 18 ML | Refills: 0 | Status: SHIPPED | OUTPATIENT
Start: 2023-04-19

## 2023-04-19 RX ORDER — PEN NEEDLE, DIABETIC 30 GX3/16"
5 NEEDLE, DISPOSABLE MISCELLANEOUS DAILY
Qty: 500 EACH | Refills: 1 | Status: SHIPPED | OUTPATIENT
Start: 2023-04-19

## 2023-04-19 RX ORDER — INSULIN ASPART 100 [IU]/ML
INJECTION, SOLUTION INTRAVENOUS; SUBCUTANEOUS
Qty: 45 ML | Refills: 0 | Status: SHIPPED | OUTPATIENT
Start: 2023-04-19 | End: 2023-04-24

## 2023-04-19 RX ORDER — INSULIN DEGLUDEC INJECTION 100 U/ML
INJECTION, SOLUTION SUBCUTANEOUS
Qty: 1 EACH | Refills: 0 | COMMUNITY
Start: 2023-04-19

## 2023-04-19 RX ORDER — GLUCAGON INJECTION, SOLUTION 1 MG/.2ML
1 INJECTION, SOLUTION SUBCUTANEOUS AS NEEDED
Qty: 0.4 ML | Refills: 1 | Status: SHIPPED | OUTPATIENT
Start: 2023-04-19

## 2023-04-19 RX ORDER — BLOOD-GLUCOSE SENSOR
1 EACH MISCELLANEOUS
Qty: 3 EACH | Refills: 3 | Status: SHIPPED | OUTPATIENT
Start: 2023-04-19

## 2023-04-19 RX ORDER — INSULIN ASPART INJECTION 100 [IU]/ML
INJECTION, SOLUTION SUBCUTANEOUS
Qty: 1 EACH | Refills: 0 | COMMUNITY
Start: 2023-04-19 | End: 2023-04-24

## 2023-04-19 NOTE — ASSESSMENT & PLAN NOTE
Reviewed with patient health impact associated with high glucose trends and the importance of better glucose control to prevent onset /progression of DM complications.

## 2023-04-20 ENCOUNTER — TELEPHONE (OUTPATIENT)
Dept: ENDOCRINOLOGY CLINIC | Facility: CLINIC | Age: 23
End: 2023-04-20

## 2023-04-20 NOTE — TELEPHONE ENCOUNTER
Received fax for PA on [employer] contacted pharmacy to see if PA is need or alternatives. Spoke to [place] needs PA ok to start?

## 2023-04-20 NOTE — TELEPHONE ENCOUNTER
Ok to proceed with PA  Sample of Shelbie Jobs provided at yesterday appointment so he should be ok until PA approved.       Also place my name on his care team.

## 2023-04-24 ENCOUNTER — TELEPHONE (OUTPATIENT)
Dept: ENDOCRINOLOGY CLINIC | Facility: CLINIC | Age: 23
End: 2023-04-24

## 2023-04-24 RX ORDER — INSULIN LISPRO 100 [IU]/ML
INJECTION, SOLUTION INTRAVENOUS; SUBCUTANEOUS
Qty: 45 ML | Refills: 0 | Status: SHIPPED | OUTPATIENT
Start: 2023-04-24

## 2023-04-24 NOTE — TELEPHONE ENCOUNTER
Call with patient, he is running about 170-200's during day. Fasting in am is sometimes 300's. States he gets home late so dinner is normally midnight to 1 pm. Pt reports his prandial insulin wasn't covered, but can see humalog was sent today and informed pt to let us know if there are any issues filling. Scheduled follow up.   Future Appointments   Date Time Provider Byron Pauly   5/11/2023  3:20 PM Ethel Aceves MD [facility]   5/25/2023  2:15 PM AMAYA Dunne [facility]   7/26/2023 11:30 AM Leonardo Fleming APRN [facility]

## 2023-04-24 NOTE — TELEPHONE ENCOUNTER
Pt not active on my chart       Last A1c value was 16.1% done 3/2/2023. Started pt on prandial insulin   Wanted to check in on BG trends       Last DM note:    Will pursue Dexcom G7 : w [facility] phr[facility]   Continue Lantus vial/syringe : 25 units once daily       When supply gone, start Tresiba U 200 flex touch: 25 units once daily       Restart prandial insulin: NOVOLOG/Fiasp (ASPART) :   IC ratio 1:10   ISF: 30 :correct above 160 mg/dl    Also noticed the 4 week phone check in appt not made-   pls schedule 2:15 pm on 5/25 or he can see CDE for appt in 4 weeks

## 2023-04-24 NOTE — TELEPHONE ENCOUNTER
Will see if humalog is preferred  Orders Placed This Encounter      Insulin Lispro, 1 Unit Dial, (HUMALOG KWIKPEN) 100 UNIT/ML Subcutaneous Solution Pen-injector          Sig: Uses up to 50 units daily as directed          Dispense:  45 mL          Refill:  0

## 2023-05-10 NOTE — TELEPHONE ENCOUNTER
[facility] pharmacy website says Dexcom order cancelled by patient. Called patient and LM requesting call back to see what the reason for cancelling order.

## 2023-05-11 ENCOUNTER — OFFICE VISIT (OUTPATIENT)
Dept: INTERNAL MEDICINE CLINIC | Facility: CLINIC | Age: 23
End: 2023-05-11
Payer: COMMERCIAL

## 2023-05-11 VITALS
OXYGEN SATURATION: 98 % | RESPIRATION RATE: 20 BRPM | DIASTOLIC BLOOD PRESSURE: 76 MMHG | SYSTOLIC BLOOD PRESSURE: 118 MMHG | TEMPERATURE: 97 F | BODY MASS INDEX: 25 KG/M2 | WEIGHT: 143.38 LBS | HEART RATE: 103 BPM

## 2023-05-11 DIAGNOSIS — Z00.00 ROUTINE GENERAL MEDICAL EXAMINATION AT A HEALTH CARE FACILITY: Primary | ICD-10-CM

## 2023-05-11 DIAGNOSIS — E78.2 MIXED HYPERLIPIDEMIA: ICD-10-CM

## 2023-05-11 DIAGNOSIS — E10.65 UNCONTROLLED TYPE 1 DIABETES MELLITUS WITH HYPERGLYCEMIA (HCC): ICD-10-CM

## 2023-05-11 PROCEDURE — 90472 IMMUNIZATION ADMIN EACH ADD: CPT | Performed by: INTERNAL MEDICINE

## 2023-05-11 PROCEDURE — 3074F SYST BP LT 130 MM HG: CPT | Performed by: INTERNAL MEDICINE

## 2023-05-11 PROCEDURE — 90677 PCV20 VACCINE IM: CPT | Performed by: INTERNAL MEDICINE

## 2023-05-11 PROCEDURE — 3078F DIAST BP <80 MM HG: CPT | Performed by: INTERNAL MEDICINE

## 2023-05-11 PROCEDURE — 90651 9VHPV VACCINE 2/3 DOSE IM: CPT | Performed by: INTERNAL MEDICINE

## 2023-05-11 PROCEDURE — 99395 PREV VISIT EST AGE 18-39: CPT | Performed by: INTERNAL MEDICINE

## 2023-05-11 PROCEDURE — 90471 IMMUNIZATION ADMIN: CPT | Performed by: INTERNAL MEDICINE

## 2023-05-11 RX ORDER — INSULIN GLARGINE-YFGN 100 [IU]/ML
INJECTION, SOLUTION SUBCUTANEOUS
COMMUNITY
Start: 2023-03-28

## 2023-05-25 ENCOUNTER — TELEMEDICINE (OUTPATIENT)
Dept: ENDOCRINOLOGY CLINIC | Facility: CLINIC | Age: 23
End: 2023-05-25
Payer: COMMERCIAL

## 2023-05-25 DIAGNOSIS — E10.65 TYPE 1 DIABETES MELLITUS WITH HYPERGLYCEMIA (HCC): Primary | ICD-10-CM

## 2023-05-25 PROCEDURE — 99214 OFFICE O/P EST MOD 30 MIN: CPT | Performed by: NURSE PRACTITIONER

## 2023-05-31 NOTE — TELEPHONE ENCOUNTER
LB 2nd message checking on why G7 was put on hold per patient request according to [facility] portal

## 2023-07-20 RX ORDER — INSULIN DEGLUDEC 200 U/ML
INJECTION, SOLUTION SUBCUTANEOUS
Qty: 18 ML | Refills: 0 | Status: SHIPPED | OUTPATIENT
Start: 2023-07-20

## 2023-07-20 NOTE — TELEPHONE ENCOUNTER
Requested Prescriptions     Pending Prescriptions Disp Refills    TRESIBA FLEXTOUCH 200 UNIT/ML Subcutaneous Solution Pen-injector [Pharmacy Med Name: Ofe Pinzon Flextouch 200 Unit/Ml Inj Caitlyn] 18 mL 0     Sig: Uses up to 40 units daily     Future Appointments   Date Time Provider Byron Coats   7/26/2023 11:30 AM AMAYA Dunne [facility] [place]     Last A1c value was 16.1% done 3/2/2023.   Refill 4/19/23  LOV 5/25/23

## 2023-10-10 ENCOUNTER — TELEPHONE (OUTPATIENT)
Dept: ENDOCRINOLOGY CLINIC | Facility: CLINIC | Age: 23
End: 2023-10-10

## 2023-10-10 NOTE — TELEPHONE ENCOUNTER
Last A1c value was 16.1% done 3/2/2023.       Cancelled 7-2023 appt and no f/u on file  Has not completed labs    Not active on my chart     Needs DM appt ASAP   Telehealth ok if labs completed (active orders from PCP)

## 2023-10-12 NOTE — TELEPHONE ENCOUNTER
LM for patient to call office to schedule an appointment ASAP or let  us know if he is seeing another provider for diabetes management.

## 2023-11-30 NOTE — TELEPHONE ENCOUNTER
Pt has not scheduled f/u or responded to certified warning letter from Diabetes   Last A1c value was 16.1% done 3/2/2023.

## 2023-12-11 NOTE — TELEPHONE ENCOUNTER
Requested Prescriptions     Pending Prescriptions Disp Refills    Insulin Lispro, 1 Unit Dial, (HUMALOG KWIKPEN) 100 UNIT/ML Subcutaneous Solution Pen-injector [Pharmacy Med Name: Humalog Kwikpen 100 Unit/Ml Inj Lill] 45 mL 0     Sig: USES UP TO 50 UNITS DAILY AS DIRECTED       HGBA1C:    Lab Results   Component Value Date    A1C 16.1 (H) 03/02/2023    A1C >16.4 (H) 07/01/2020    A1C 12.0 (H) 09/06/2019     (H) 03/02/2023     LOV: 5-25-23    REFILL: 4-24-23    [facility]

## 2023-12-14 RX ORDER — INSULIN LISPRO 100 [IU]/ML
INJECTION, SOLUTION INTRAVENOUS; SUBCUTANEOUS
Qty: 45 ML | Refills: 0 | OUTPATIENT
Start: 2023-12-14

## 2023-12-14 NOTE — TELEPHONE ENCOUNTER
Future Appointments   Date Time Provider Byron Coats   2/19/2024  9:00 AM Golden Anguiano MD [facility]     Which pharmacy:[place]    Prescription Refill Request - Patient advised can take 48-72 hours.       Name of Medication (strength, dose, qty requested:     Medication Quantity Refills Start End   Insulin Lispro, 1 Unit Dial, (HUMALOG KWIKPEN) 100 UNIT/ML Subcutaneous Solution Pen-injector 45 mL 0 4/24/2023 --   Sig:   Uses up to 50 units daily as directed

## 2023-12-21 NOTE — TELEPHONE ENCOUNTER
Third attempt for r/s LVMTCB     Contacted alternative number spoke to pt mom to just ask if they can contact pt to contact office

## 2023-12-28 ENCOUNTER — HOSPITAL ENCOUNTER (INPATIENT)
Facility: HOSPITAL | Age: 23
LOS: 4 days | Discharge: HOME | End: 2024-01-01
Attending: EMERGENCY MEDICINE | Admitting: HOSPITALIST
Payer: COMMERCIAL

## 2023-12-28 ENCOUNTER — APPOINTMENT (OUTPATIENT)
Dept: GENERAL RADIOLOGY | Facility: HOSPITAL | Age: 23
End: 2023-12-28
Attending: EMERGENCY MEDICINE
Payer: COMMERCIAL

## 2023-12-28 DIAGNOSIS — E10.10 TYPE 1 DIABETES MELLITUS WITH KETOACIDOSIS WITHOUT COMA (HCC): Primary | ICD-10-CM

## 2023-12-28 DIAGNOSIS — U07.1 COVID: ICD-10-CM

## 2023-12-28 PROBLEM — E87.20 METABOLIC ACIDOSIS: Status: ACTIVE | Noted: 2023-12-28

## 2023-12-28 PROBLEM — E87.1 HYPONATREMIA: Status: ACTIVE | Noted: 2023-12-28

## 2023-12-28 PROBLEM — N17.9 ACUTE KIDNEY INJURY (HCC): Status: ACTIVE | Noted: 2023-12-28

## 2023-12-28 PROBLEM — R73.9 HYPERGLYCEMIA: Status: ACTIVE | Noted: 2023-12-28

## 2023-12-28 PROBLEM — N17.9 ACUTE KIDNEY INJURY: Status: ACTIVE | Noted: 2023-12-28

## 2023-12-28 LAB
ALBUMIN SERPL-MCNC: 4.3 G/DL (ref 3.4–5)
ALBUMIN/GLOB SERPL: 1 {RATIO} (ref 1–2)
ALP LIVER SERPL-CCNC: 90 U/L
ALT SERPL-CCNC: 19 U/L
ANION GAP SERPL CALC-SCNC: 12 MMOL/L (ref 0–18)
ANION GAP SERPL CALC-SCNC: 17 MMOL/L (ref 0–18)
ANION GAP SERPL CALC-SCNC: 22 MMOL/L (ref 0–18)
ANION GAP SERPL CALC-SCNC: 9 MMOL/L (ref 0–18)
AST SERPL-CCNC: 13 U/L (ref 15–37)
ATRIAL RATE: 120 BPM
BASOPHILS # BLD AUTO: 0.05 X10(3) UL (ref 0–0.2)
BASOPHILS NFR BLD AUTO: 0.4 %
BILIRUB SERPL-MCNC: 0.4 MG/DL (ref 0.1–2)
BILIRUB UR QL STRIP.AUTO: NEGATIVE
BUN BLD-MCNC: 11 MG/DL (ref 9–23)
BUN BLD-MCNC: 12 MG/DL (ref 9–23)
BUN BLD-MCNC: 13 MG/DL (ref 9–23)
BUN BLD-MCNC: 14 MG/DL (ref 9–23)
CALCIUM BLD-MCNC: 8 MG/DL (ref 8.5–10.1)
CALCIUM BLD-MCNC: 8 MG/DL (ref 8.5–10.1)
CALCIUM BLD-MCNC: 8.1 MG/DL (ref 8.5–10.1)
CALCIUM BLD-MCNC: 8.6 MG/DL (ref 8.5–10.1)
CHLORIDE SERPL-SCNC: 107 MMOL/L (ref 98–112)
CHLORIDE SERPL-SCNC: 113 MMOL/L (ref 98–112)
CHLORIDE SERPL-SCNC: 115 MMOL/L (ref 98–112)
CHLORIDE SERPL-SCNC: 115 MMOL/L (ref 98–112)
CLARITY UR REFRACT.AUTO: CLEAR
CO2 SERPL-SCNC: 12 MMOL/L (ref 21–32)
CO2 SERPL-SCNC: 5 MMOL/L (ref 21–32)
CO2 SERPL-SCNC: 5 MMOL/L (ref 21–32)
CO2 SERPL-SCNC: 8 MMOL/L (ref 21–32)
CREAT BLD-MCNC: 1.24 MG/DL
CREAT BLD-MCNC: 1.25 MG/DL
CREAT BLD-MCNC: 1.26 MG/DL
CREAT BLD-MCNC: 1.36 MG/DL
CREAT BLD-MCNC: 1.45 MG/DL
EGFRCR SERPLBLD CKD-EPI 2021: 69 ML/MIN/1.73M2 (ref 60–?)
EGFRCR SERPLBLD CKD-EPI 2021: 74 ML/MIN/1.73M2 (ref 60–?)
EGFRCR SERPLBLD CKD-EPI 2021: 81 ML/MIN/1.73M2 (ref 60–?)
EGFRCR SERPLBLD CKD-EPI 2021: 82 ML/MIN/1.73M2 (ref 60–?)
EGFRCR SERPLBLD CKD-EPI 2021: 83 ML/MIN/1.73M2 (ref 60–?)
EOSINOPHIL # BLD AUTO: 0.01 X10(3) UL (ref 0–0.7)
EOSINOPHIL NFR BLD AUTO: 0.1 %
ERYTHROCYTE [DISTWIDTH] IN BLOOD BY AUTOMATED COUNT: 12.8 %
EST. AVERAGE GLUCOSE BLD GHB EST-MCNC: 378 MG/DL (ref 68–126)
FLUAV + FLUBV RNA SPEC NAA+PROBE: NEGATIVE
FLUAV + FLUBV RNA SPEC NAA+PROBE: NEGATIVE
GLOBULIN PLAS-MCNC: 4.1 G/DL (ref 2.8–4.4)
GLUCOSE BLD-MCNC: 141 MG/DL (ref 70–99)
GLUCOSE BLD-MCNC: 149 MG/DL (ref 70–99)
GLUCOSE BLD-MCNC: 161 MG/DL (ref 70–99)
GLUCOSE BLD-MCNC: 168 MG/DL (ref 70–99)
GLUCOSE BLD-MCNC: 178 MG/DL (ref 70–99)
GLUCOSE BLD-MCNC: 180 MG/DL (ref 70–99)
GLUCOSE BLD-MCNC: 192 MG/DL (ref 70–99)
GLUCOSE BLD-MCNC: 199 MG/DL (ref 70–99)
GLUCOSE BLD-MCNC: 200 MG/DL (ref 70–99)
GLUCOSE BLD-MCNC: 201 MG/DL (ref 70–99)
GLUCOSE BLD-MCNC: 201 MG/DL (ref 70–99)
GLUCOSE BLD-MCNC: 203 MG/DL (ref 70–99)
GLUCOSE BLD-MCNC: 204 MG/DL (ref 70–99)
GLUCOSE BLD-MCNC: 214 MG/DL (ref 70–99)
GLUCOSE BLD-MCNC: 222 MG/DL (ref 70–99)
GLUCOSE BLD-MCNC: 223 MG/DL (ref 70–99)
GLUCOSE BLD-MCNC: 260 MG/DL (ref 70–99)
GLUCOSE BLD-MCNC: 272 MG/DL (ref 70–99)
GLUCOSE BLD-MCNC: 296 MG/DL (ref 70–99)
GLUCOSE BLD-MCNC: 298 MG/DL (ref 70–99)
GLUCOSE BLD-MCNC: 301 MG/DL (ref 70–99)
GLUCOSE BLD-MCNC: 315 MG/DL (ref 70–99)
GLUCOSE BLD-MCNC: 319 MG/DL (ref 70–99)
GLUCOSE UR STRIP.AUTO-MCNC: >1000 MG/DL
HBA1C MFR BLD: 14.8 % (ref ?–5.7)
HCT VFR BLD AUTO: 52.7 %
HGB BLD-MCNC: 17.4 G/DL
IMM GRANULOCYTES # BLD AUTO: 0.09 X10(3) UL (ref 0–1)
IMM GRANULOCYTES NFR BLD: 0.8 %
KETONES UR STRIP.AUTO-MCNC: >150 MG/DL
LEUKOCYTE ESTERASE UR QL STRIP.AUTO: NEGATIVE
LYMPHOCYTES # BLD AUTO: 1.04 X10(3) UL (ref 1–4)
LYMPHOCYTES NFR BLD AUTO: 8.8 %
MAGNESIUM SERPL-MCNC: 2.2 MG/DL (ref 1.6–2.6)
MAGNESIUM SERPL-MCNC: 2.3 MG/DL (ref 1.6–2.6)
MCH RBC QN AUTO: 29.4 PG (ref 26–34)
MCHC RBC AUTO-ENTMCNC: 33 G/DL (ref 31–37)
MCV RBC AUTO: 89.2 FL
MONOCYTES # BLD AUTO: 0.91 X10(3) UL (ref 0.1–1)
MONOCYTES NFR BLD AUTO: 7.7 %
MRSA DNA SPEC QL NAA+PROBE: NEGATIVE
NEUTROPHILS # BLD AUTO: 9.74 X10 (3) UL (ref 1.5–7.7)
NEUTROPHILS # BLD AUTO: 9.74 X10(3) UL (ref 1.5–7.7)
NEUTROPHILS NFR BLD AUTO: 82.2 %
NITRITE UR QL STRIP.AUTO: NEGATIVE
OSMOLALITY SERPL CALC.SUM OF ELEC: 287 MOSM/KG (ref 275–295)
OSMOLALITY SERPL CALC.SUM OF ELEC: 287 MOSM/KG (ref 275–295)
OSMOLALITY SERPL CALC.SUM OF ELEC: 290 MOSM/KG (ref 275–295)
OSMOLALITY SERPL CALC.SUM OF ELEC: 290 MOSM/KG (ref 275–295)
P AXIS: 78 DEGREES
P-R INTERVAL: 128 MS
PH UR STRIP.AUTO: 5.5 [PH] (ref 5–8)
PHOSPHATE SERPL-MCNC: 1.7 MG/DL (ref 2.5–4.9)
PHOSPHATE SERPL-MCNC: 1.8 MG/DL (ref 2.5–4.9)
PLATELET # BLD AUTO: 242 10(3)UL (ref 150–450)
POTASSIUM SERPL-SCNC: 3.5 MMOL/L (ref 3.5–5.1)
POTASSIUM SERPL-SCNC: 3.8 MMOL/L (ref 3.5–5.1)
POTASSIUM SERPL-SCNC: 4.2 MMOL/L (ref 3.5–5.1)
POTASSIUM SERPL-SCNC: 4.8 MMOL/L (ref 3.5–5.1)
PROT SERPL-MCNC: 8.4 G/DL (ref 6.4–8.2)
PROT UR STRIP.AUTO-MCNC: 100 MG/DL
Q-T INTERVAL: 340 MS
QRS DURATION: 84 MS
QTC CALCULATION (BEZET): 480 MS
R AXIS: 72 DEGREES
RBC # BLD AUTO: 5.91 X10(6)UL
RSV RNA SPEC NAA+PROBE: NEGATIVE
SARS-COV-2 RNA RESP QL NAA+PROBE: DETECTED
SODIUM SERPL-SCNC: 134 MMOL/L (ref 136–145)
SODIUM SERPL-SCNC: 135 MMOL/L (ref 136–145)
SODIUM SERPL-SCNC: 135 MMOL/L (ref 136–145)
SODIUM SERPL-SCNC: 136 MMOL/L (ref 136–145)
SP GR UR STRIP.AUTO: 1.02 (ref 1–1.03)
T AXIS: 60 DEGREES
UROBILINOGEN UR STRIP.AUTO-MCNC: NORMAL MG/DL
VENTRICULAR RATE: 120 BPM
WBC # BLD AUTO: 11.8 X10(3) UL (ref 4–11)

## 2023-12-28 PROCEDURE — 71045 X-RAY EXAM CHEST 1 VIEW: CPT | Performed by: EMERGENCY MEDICINE

## 2023-12-28 PROCEDURE — 99291 CRITICAL CARE FIRST HOUR: CPT | Performed by: HOSPITALIST

## 2023-12-28 PROCEDURE — 99255 IP/OBS CONSLTJ NEW/EST HI 80: CPT | Performed by: INTERNAL MEDICINE

## 2023-12-28 RX ORDER — SENNOSIDES 8.6 MG
17.2 TABLET ORAL NIGHTLY PRN
Status: DISCONTINUED | OUTPATIENT
Start: 2023-12-28 | End: 2024-01-01

## 2023-12-28 RX ORDER — SODIUM CHLORIDE 9 MG/ML
INJECTION, SOLUTION INTRAVENOUS CONTINUOUS
Status: CANCELLED | OUTPATIENT
Start: 2023-12-28 | End: 2023-12-28

## 2023-12-28 RX ORDER — ONDANSETRON 2 MG/ML
4 INJECTION INTRAMUSCULAR; INTRAVENOUS ONCE
Status: COMPLETED | OUTPATIENT
Start: 2023-12-28 | End: 2023-12-28

## 2023-12-28 RX ORDER — POLYETHYLENE GLYCOL 3350 17 G/17G
17 POWDER, FOR SOLUTION ORAL DAILY PRN
Status: DISCONTINUED | OUTPATIENT
Start: 2023-12-28 | End: 2024-01-01

## 2023-12-28 RX ORDER — ONDANSETRON 2 MG/ML
4 INJECTION INTRAMUSCULAR; INTRAVENOUS EVERY 6 HOURS PRN
Status: DISCONTINUED | OUTPATIENT
Start: 2023-12-28 | End: 2024-01-01

## 2023-12-28 RX ORDER — DEXTROSE MONOHYDRATE 25 G/50ML
50 INJECTION, SOLUTION INTRAVENOUS
Status: DISCONTINUED | OUTPATIENT
Start: 2023-12-28 | End: 2024-01-01

## 2023-12-28 RX ORDER — ACETAMINOPHEN 500 MG
500 TABLET ORAL EVERY 4 HOURS PRN
Status: DISCONTINUED | OUTPATIENT
Start: 2023-12-28 | End: 2024-01-01

## 2023-12-28 RX ORDER — SODIUM CHLORIDE 9 MG/ML
125 INJECTION, SOLUTION INTRAVENOUS CONTINUOUS
Status: DISCONTINUED | OUTPATIENT
Start: 2023-12-28 | End: 2023-12-28

## 2023-12-28 RX ORDER — MELATONIN
3 NIGHTLY PRN
Status: DISCONTINUED | OUTPATIENT
Start: 2023-12-28 | End: 2024-01-01

## 2023-12-28 RX ORDER — SODIUM CHLORIDE 9 MG/ML
INJECTION, SOLUTION INTRAVENOUS CONTINUOUS
Status: DISCONTINUED | OUTPATIENT
Start: 2023-12-28 | End: 2023-12-30

## 2023-12-28 RX ORDER — PROCHLORPERAZINE EDISYLATE 5 MG/ML
5 INJECTION INTRAMUSCULAR; INTRAVENOUS EVERY 8 HOURS PRN
Status: DISCONTINUED | OUTPATIENT
Start: 2023-12-28 | End: 2024-01-01

## 2023-12-28 RX ORDER — BISACODYL 10 MG
10 SUPPOSITORY, RECTAL RECTAL
Status: DISCONTINUED | OUTPATIENT
Start: 2023-12-28 | End: 2024-01-01

## 2023-12-28 RX ORDER — NICOTINE POLACRILEX 4 MG
15 LOZENGE BUCCAL
Status: DISCONTINUED | OUTPATIENT
Start: 2023-12-28 | End: 2024-01-01

## 2023-12-28 RX ORDER — NICOTINE POLACRILEX 4 MG
30 LOZENGE BUCCAL
Status: DISCONTINUED | OUTPATIENT
Start: 2023-12-28 | End: 2024-01-01

## 2023-12-28 RX ORDER — DEXTROSE AND SODIUM CHLORIDE 5; .45 G/100ML; G/100ML
125 INJECTION, SOLUTION INTRAVENOUS CONTINUOUS PRN
Status: DISCONTINUED | OUTPATIENT
Start: 2023-12-28 | End: 2023-12-30

## 2023-12-28 RX ORDER — HEPARIN SODIUM 5000 [USP'U]/ML
5000 INJECTION, SOLUTION INTRAVENOUS; SUBCUTANEOUS EVERY 12 HOURS SCHEDULED
Status: DISCONTINUED | OUTPATIENT
Start: 2023-12-28 | End: 2023-12-29

## 2023-12-28 RX ORDER — ENEMA 19; 7 G/133ML; G/133ML
1 ENEMA RECTAL ONCE AS NEEDED
Status: DISCONTINUED | OUTPATIENT
Start: 2023-12-28 | End: 2024-01-01

## 2023-12-28 NOTE — ED QUICK NOTES
Pt's mother called looking for update. Pt reports ok to speak with her.     Mother updated on plan of care.

## 2023-12-28 NOTE — ED PROVIDER NOTES
Patient Seen in: [facility] Emergency Department      History     Chief Complaint   Patient presents with    Arrythmia/Palpitations    Nausea/Vomiting/Diarrhea    Hyperglycemia     Stated Complaint: tachycardia, dehydration, SOB with exertion    Subjective:   HPI    Patient presents to the emergency department with a 3-day history of progressively worsening generalized malaise and weakness.  The patient states that he is a type I diabetic and was unable to refill his Humalog.  However, during this period of time, the patient has been on his long-acting insulin.  He has had no fever or chills.  He has had no specific cough, shortness of breath or pain.  He has been somewhat nauseous, particularly in the past 12 hours.  The patient states that he has had DKA in the past and states that his current symptoms are similar to his DKA.    Objective:   Past Medical History:   Diagnosis Date    Asthma     Diabetes mellitus (HCC)     Extrinsic asthma, unspecified     Seasonal allergies     Type 1 diabetes mellitus (HCC)               History reviewed. No pertinent surgical history.             Social History     Socioeconomic History    Marital status: Single   Tobacco Use    Smoking status: Never     Passive exposure: Never    Smokeless tobacco: Never   Vaping Use    Vaping Use: Never used   Substance and Sexual Activity    Alcohol use: Not Currently     Alcohol/week: 4.0 standard drinks of alcohol     Types: 4 Glasses of wine per week    Drug use: Never     Social Determinants of Health     Food Insecurity: No Food Insecurity (12/28/2023)    Food Insecurity     Food Insecurity: Never true   Transportation Needs: Unmet Transportation Needs (12/28/2023)    Transportation Needs     Lack of Transportation: Yes   Housing Stability: Low Risk  (12/28/2023)    Housing Stability     Housing Instability: No              Review of Systems    Positive for stated complaint: tachycardia, dehydration, SOB with exertion  Other  systems are as noted in HPI.  Constitutional and vital signs reviewed.      All other systems reviewed and negative except as noted above.    Physical Exam     ED Triage Vitals [12/28/23 0446]   /87   Pulse 120   Resp 18   Temp 97.9 °F (36.6 °C)   Temp src Temporal   SpO2 99 %   O2 Device None (Room air)       Current:/69 (BP Location: Right arm)   Pulse 108   Temp 98.9 °F (37.2 °C) (Temporal)   Resp 13   Ht 162.6 cm (5' 4\")   Wt 48.4 kg   SpO2 98%   BMI 18.32 kg/m²         Physical Exam  Vitals and nursing note reviewed.   Constitutional:       Appearance: He is well-developed.   HENT:      Head: Normocephalic.      Mouth/Throat:      Mouth: Mucous membranes are dry.   Cardiovascular:      Rate and Rhythm: Regular rhythm. Tachycardia present.      Heart sounds: Normal heart sounds. No murmur heard.  Pulmonary:      Breath sounds: Normal breath sounds. No wheezing or rales.      Comments: Patient is tachypneic.  Abdominal:      General: Bowel sounds are normal.      Palpations: Abdomen is soft.      Tenderness: There is no abdominal tenderness. There is no guarding.   Musculoskeletal:         General: No tenderness. Normal range of motion.      Cervical back: Normal range of motion and neck supple.   Lymphadenopathy:      Cervical: No cervical adenopathy.   Skin:     General: Skin is warm and dry.      Findings: No rash.   Neurological:      Mental Status: He is alert and oriented to person, place, and time.      Sensory: No sensory deficit.              ED Course     Labs Reviewed   COMP METABOLIC PANEL (14) - Abnormal; Notable for the following components:       Result Value    Glucose 319 (*)     Sodium 134 (*)     CO2 5.0 (*)     Anion Gap 22 (*)     Creatinine 1.45 (*)     AST 13 (*)     Total Protein 8.4 (*)     All other components within normal limits   URINALYSIS, ROUTINE - Abnormal; Notable for the following components:    Glucose Urine >1000 (*)     Ketones Urine >150 (*)     Blood  Urine 2+ (*)     Protein Urine 100 (*)     RBC Urine 3-5 (*)     All other components within normal limits   BASIC METABOLIC PANEL (8) - Abnormal; Notable for the following components:    Glucose 272 (*)     Sodium 135 (*)     Chloride 113 (*)     CO2 5.0 (*)     Creatinine 1.36 (*)     Calcium, Total 8.1 (*)     All other components within normal limits   BASIC METABOLIC PANEL (8) - Abnormal; Notable for the following components:    Glucose 223 (*)     Sodium 135 (*)     Chloride 115 (*)     CO2 8.0 (*)     Calcium, Total 8.0 (*)     All other components within normal limits   HEMOGLOBIN A1C - Abnormal; Notable for the following components:    HgbA1C 14.8 (*)     Estimated Average Glucose 378 (*)     All other components within normal limits   PHOSPHORUS - Abnormal; Notable for the following components:    Phosphorus 1.7 (*)     All other components within normal limits   POCT GLUCOSE - Abnormal; Notable for the following components:    POC Glucose 301 (*)     All other components within normal limits   POCT GLUCOSE - Abnormal; Notable for the following components:    POC Glucose 315 (*)     All other components within normal limits   POCT GLUCOSE - Abnormal; Notable for the following components:    POC Glucose 296 (*)     All other components within normal limits   POCT GLUCOSE - Abnormal; Notable for the following components:    POC Glucose 298 (*)     All other components within normal limits   POCT GLUCOSE - Abnormal; Notable for the following components:    POC Glucose 260 (*)     All other components within normal limits   POCT GLUCOSE - Abnormal; Notable for the following components:    POC Glucose 214 (*)     All other components within normal limits   POCT GLUCOSE - Abnormal; Notable for the following components:    POC Glucose 192 (*)     All other components within normal limits   POCT GLUCOSE - Abnormal; Notable for the following components:    POC Glucose 203 (*)     All other components within normal  limits   POCT GLUCOSE - Abnormal; Notable for the following components:    POC Glucose 201 (*)     All other components within normal limits   POCT GLUCOSE - Abnormal; Notable for the following components:    POC Glucose 178 (*)     All other components within normal limits   SARS-COV-2/FLU A AND B/RSV BY PCR (GENEXPERT) - Abnormal; Notable for the following components:    SARS-CoV-2 (COVID-19) - (GeneXpert) Detected (*)     All other components within normal limits    Narrative:     This test is intended for the qualitative detection and differentiation of SARS-CoV-2, influenza A, influenza B, and respiratory syncytial virus (RSV) viral RNA in nasopharyngeal or nares swabs from individuals suspected of respiratory viral infection consistent with COVID-19 by their healthcare provider. Signs and symptoms of respiratory viral infection due to SARS-CoV-2, influenza, and RSV can be similar.    Test performed using the Xpert Xpress SARS-CoV-2/FLU/RSV (real time RT-PCR)  assay on the [employer] instrument, [employer], [place] [address].   This test is being used under the Food and Drug Administration's Emergency Use Authorization.    The authorized Fact Sheet for Healthcare Providers for this assay is available upon request from the laboratory.   CBC W/ DIFFERENTIAL - Abnormal; Notable for the following components:    WBC 11.8 (*)     RBC 5.91 (*)     Neutrophil Absolute Prelim 9.74 (*)     Neutrophil Absolute 9.74 (*)     All other components within normal limits   MAGNESIUM - Normal   CREATININE, SERUM - Normal   ED/MRSA SCREEN BY PCR-CC - Normal   CBC WITH DIFFERENTIAL WITH PLATELET    Narrative:     The following orders were created for panel order CBC With Differential With Platelet.  Procedure                               Abnormality         Status                     ---------                               -----------         ------                     CBC W/ DIFFERENTIAL[[id number]]          Abnormal             Final result                 Please view results for these tests on the individual orders.   BASIC METABOLIC PANEL (8)   MAGNESIUM   PHOSPHORUS     EKG    Rate, intervals and axes as noted on EKG Report.  Rate: 120  Rhythm: Sinus Rhythm  Reading: Sinus tachycardia without evidence of acute ischemia.           ED Course as of 12/28/23 1629  ------------------------------------------------------------  Time: 12/28 0651  Value: Carbon Dioxide, Total(!!): 5.0  Comment: (Reviewed)  ------------------------------------------------------------  Time: 12/28 0651  Value: Glucose(!): 319  Comment: (Reviewed)  ------------------------------------------------------------  Time: 12/28 0651  Value: ANION GAP(!): 22  Comment: (Reviewed)  ------------------------------------------------------------  Time: 12/28 0651  Value: WBC(!): 11.8  Comment: (Reviewed)  ------------------------------------------------------------  Time: 12/28 0651  Value: EKG  Comment: EKG    Rate, intervals and axes as noted on EKG Report.  Rate: 120  Rhythm: Sinus Rhythm  Reading: Sinus tachycardia without any ischemic abnormalities.        ------------------------------------------------------------  Time: 12/28 0652  Value: Pulse(!): 124  Comment: (Reviewed)  ------------------------------------------------------------  Time: 12/28 0730  Value: POC GLUCOSE(!): 296  Comment: (Reviewed)  ------------------------------------------------------------  Time: 12/28 0804  Value: COVID19(!): Detected  Comment: (Reviewed)  ------------------------------------------------------------  Time: 12/28 1024  Value: Carbon Dioxide, Total(!!): 5.0  Comment: (Reviewed)  ------------------------------------------------------------  Time: 12/28 1024  Value: Glucose(!): 272  Comment: (Reviewed)  ------------------------------------------------------------  Time: 12/28 1055  Value: XR CHEST AP PORTABLE  (CPT=71045)  Comment: Images were independently viewed by me and no  infiltrate was noted     Medications   influenza vaccine split quad (Fluzone QIV) 0.5 mL IM injection (ages 6 months to 64 years) 0.5 mL (has no administration in time range)   sodium chloride 0.9% infusion ( Intravenous Not Given 12/28/23 1315)   glucose (Dex4) 15 GM/59ML oral liquid 15 g (has no administration in time range)     Or   glucose (Glutose) 40% oral gel 15 g (has no administration in time range)     Or   glucose-vitamin C (Dex-4) chewable tab 4 tablet (has no administration in time range)     Or   dextrose 50% injection 50 mL (has no administration in time range)     Or   glucose (Dex4) 15 GM/59ML oral liquid 30 g (has no administration in time range)     Or   glucose (Glutose) 40% oral gel 30 g (has no administration in time range)     Or   glucose-vitamin C (Dex-4) chewable tab 8 tablet (has no administration in time range)   insulin regular human (Novolin R, Humulin R) 100 Units in sodium chloride 0.9% 100 mL standard infusion (100 mL) (6 Units/hr Intravenous Rate/Dose Change 12/28/23 1619)   melatonin tab 3 mg (has no administration in time range)   heparin (Porcine) 5000 UNIT/ML injection 5,000 Units (has no administration in time range)   acetaminophen (Tylenol Extra Strength) tab 500 mg (has no administration in time range)   polyethylene glycol (PEG 3350) (Miralax) 17 g oral packet 17 g (has no administration in time range)   sennosides (Senokot) tab 17.2 mg (has no administration in time range)   bisacodyl (Dulcolax) 10 MG rectal suppository 10 mg (has no administration in time range)   fleet enema (Fleet) 7-19 GM/118ML rectal enema 133 mL (has no administration in time range)   ondansetron (Zofran) 4 MG/2ML injection 4 mg (has no administration in time range)   prochlorperazine (Compazine) 10 MG/2ML injection 5 mg (has no administration in time range)   dextrose 5%-sodium chloride 0.45% infusion (125 mL/hr Intravenous New Bag 12/28/23 1300)   nirmatrelvir-ritonavir (Paxlovid) 300-100 MG  therapy pack 3 tablet (has no administration in time range)   sodium phosphate 15 mmol in 0.9% NaCl 100mL IVPB premix (has no administration in time range)   sodium chloride 0.9 % IV bolus 1,000 mL (0 mL Intravenous Stopped 12/28/23 0631)   ondansetron (Zofran) 4 MG/2ML injection 4 mg (4 mg Intravenous Given 12/28/23 0511)         A total of 45 minutes of critical care time (exclusive of billable procedures) was administered to manage the patient's critical lab values and metabolic instability due to his DKA and acute COVID infection..  This involved direct patient intervention, complex decision making, and/or extensive discussions with the patient, family, and clinical staff.         MDM      Patient presents to the emergency department with generalized malaise and recent history of being unable to fill his Humalog prescription.  Differential did include acute DKA along with precipitating pathology such as pneumonia, other acute infection or acute cardiac abnormalities.  Patient's lab work did confirm DKA and patient was also noted to be COVID-positive.  While in the emergency department, the patient received fluid resuscitation and insulin drip was initiated.  After initial treatment, patient's chemistries were reassessed and only minimal improvement was noted.  Because of this, the patient was hospitalized to the intensive care under the care of of hospitalist service and intensivist.  Admission disposition: 12/28/2023 10:41 AM                                        Medical Decision Making      Disposition and Plan     Clinical Impression:  1. Type 1 diabetes mellitus with ketoacidosis without coma (HCC)    2. COVID         Disposition:  Admit  12/28/2023 10:41 am    Follow-up:  No follow-up provider specified.        Medications Prescribed:  Current Discharge Medication List                           Hospital Problems       Present on Admission  Date Reviewed: 5/11/2023            ICD-10-CM Noted POA    *  (Principal) Type 1 diabetes mellitus with ketoacidosis without coma (HCC) E10.10 12/28/2023 Unknown    Acute kidney injury (HCC) N17.9 12/28/2023 Yes    COVID U07.1 12/28/2023 Unknown    Hyperglycemia R73.9 12/28/2023 Yes    Hyponatremia E87.1 12/28/2023 Yes    Metabolic acidosis E87.20 12/28/2023 Yes

## 2023-12-28 NOTE — CONSULTS
ICU  Critical Care APRN Consult Note    NAME: Yusuf Alberto - ROOM: 464/4-A - MRN: [id number] - Age: 23 year old - :2000    History Of Present Illness:  Yusuf Alberto is a 23 year old adult with PMHx significant for asthma and type 1 diabetes presents to the ED with MARCOS and elevated blood sugars.  Patient reveals he ran out of his insulin prior to [place] and has not been able to renew it.  ED work up reveals DKA and covid.  Patient admitted to the ICU for insulin infusion and close hemodynamic monitoring.      PMH:  Past Medical History:   Diagnosis Date    Asthma     Diabetes mellitus (HCC)     Extrinsic asthma, unspecified     Seasonal allergies     Type 1 diabetes mellitus (HCC)        Social Hx:  Social History     Socioeconomic History    Marital status: Single   Tobacco Use    Smoking status: Never     Passive exposure: Never    Smokeless tobacco: Never   Vaping Use    Vaping Use: Never used   Substance and Sexual Activity    Alcohol use: Not Currently     Alcohol/week: 4.0 standard drinks of alcohol     Types: 4 Glasses of wine per week    Drug use: Never     Social Determinants of Health     Food Insecurity: No Food Insecurity (2023)    Food Insecurity     Food Insecurity: Never true   Transportation Needs: Unmet Transportation Needs (2023)    Transportation Needs     Lack of Transportation: Yes   Housing Stability: Low Risk  (2023)    Housing Stability     Housing Instability: No       Family Hx:  Family History   Problem Relation Age of Onset    Hypertension Mother     Hypertension Maternal Grandmother     Diabetes Maternal Grandmother     Cancer Maternal Grandmother     Lipids Maternal Grandmother     Stroke Maternal Grandmother     Other (atrial flutter) Maternal Grandmother     Hypertension Maternal Grandfather     Lipids Maternal Grandfather     Cancer Maternal Grandfather          Review of Systems:   A comprehensive 10 point review of systems was completed.  Pertinent  positives and negatives noted in the HPI.    OBJECTIVE  Vitals:  /77 (BP Location: Right arm)   Pulse 110   Temp 98.6 °F (37 °C) (Temporal)   Resp 19   Ht 162.6 cm (5' 4\")   Wt 106 lb 11.2 oz (48.4 kg)   SpO2 99%   BMI 18.32 kg/m²                Physical Exam:    General Appearance: Alert, cooperative, no distress, appears stated age  Neck: No JVD, neck supple, no adenopathy, trachea midline, no carotid bruits  Lungs: Clear to auscultation bilaterally, respirations unlabored  Heart: Regular rate and rhythm, S1 and S2 normal, no murmur, rub or gallop  Abdomen: Soft, non-tender, bowel sounds active all four quadrants, no masses, no organomegaly  Extremities: Extremities normal, atraumatic, no cyanosis or edema,capillary refill <3 sec.    Pulses: 2+ and symmetric all extremities  Skin: Skin color, texture, turgor normal for ethnicity, no rashes or lesions, warm and dry  Neurologic: CNII-XII intact, normal strength    Data this admission:  XR CHEST AP PORTABLE  (CPT=71045)    Result Date: 12/28/2023  CONCLUSION:  No focal consolidation.   LOCATION:  [facility]      Dictated by (CST): Celia Zavala MD on 12/28/2023 at 8:23 AM     Finalized by (CST): Celia Zavala MD on 12/28/2023 at 8:24 AM         Labs:  Lab Results   Component Value Date    WBC 11.8 12/28/2023    HGB 17.4 12/28/2023    HCT 52.7 12/28/2023    .0 12/28/2023    CREATSERUM 1.36 12/28/2023    BUN 14 12/28/2023     12/28/2023    K 4.2 12/28/2023     12/28/2023    CO2 5.0 12/28/2023     12/28/2023    CA 8.1 12/28/2023    ALB 4.3 12/28/2023    ALKPHO 90 12/28/2023    BILT 0.4 12/28/2023    TP 8.4 12/28/2023    AST 13 12/28/2023    ALT 19 12/28/2023       Assessment/Plan:    DKA  -insulin drip  -Labs q4hrs  -NPO while on insulin drip  -consult diabetes APRN for resources  COVID  -Paxlovid  -Monitor for worsening symptoms  F/E/N  -NPO  -IVF  -replete electrolytes as needed  Proph  -SCD  -Heparin  Dispo  -full code  -ICU while on  insulin drip      Plan of care discussed with intensivist on-call Dr. Contreras.    A total of 35 minutes of critical care time (exclusive of billable procedures) was administered. This involved direct patient intervention, complex decision making, and/or extensive discussions with the patient, family, and clinical staff.    Pulmonary Critical Care Physician ADDENDUM     I have personally seen and examined the patient.  I have reviewed and agree with Tyra ELMORE's documentation with the following highlights/changes.       Patient with history of asthma and type 1 DM who presented on 12/28 with increased SOB and generalized malaise.  Pt states that he has been out of insulin since prior to Christmas and then came down with URI symptoms, so was not consistently checking his blood sugars at home.  Due to progressive SOB and malaise, he came into the ER where he was noted to be in severe DKA with bicarb of 5 and also found to be covid positive.  He was started on IVF and insulin gtt and admitted to the ICU for further care.  Will continue insulin gtt until anion gap closes.  Start paxlovid for covid.      Jason PETERS

## 2023-12-28 NOTE — H&P
[facility]     Yusuf Alberto Patient Status:  Inpatient    2000 MRN [id number]   Location [facility] 4SW-A Attending Chidi Perry, DO   Hosp Day # 0 PCP Chiki Hernandez MD     Chief Complaint: Elevated heart rate    Subjective:    History of Present Illness:     Yusuf Alberto is a 23 year old adult with a PMH of DMI presented to ED due to elevated heart rate. Patient noticed palpitations, check heart rate with pulse-ox, and noticed heart rate to be in 120's. Patient denies chest pain or palpitations. Reports 4 episodes of emesis yesterday. Denies diarrhea or dysuria. Patient reports running out of insulin about 4 days ago.     History/Other:    Past Medical History:  Past Medical History:   Diagnosis Date    Asthma     Diabetes mellitus (HCC)     Extrinsic asthma, unspecified     Seasonal allergies     Type 1 diabetes mellitus (HCC)      Past Surgical History:   History reviewed. No pertinent surgical history.   Family History:   Family History   Problem Relation Age of Onset    Hypertension Mother     Hypertension Maternal Grandmother     Diabetes Maternal Grandmother     Cancer Maternal Grandmother     Lipids Maternal Grandmother     Stroke Maternal Grandmother     Other (atrial flutter) Maternal Grandmother     Hypertension Maternal Grandfather     Lipids Maternal Grandfather     Cancer Maternal Grandfather      Social History:    reports that Yuusf Alberto has never smoked. Yusuf Alberto has never been exposed to tobacco smoke. Yusuf Alberto has never used smokeless tobacco. Yusuf Alberto reports that Yusuf Alberto does not currently use alcohol after a past usage of about 4.0 standard drinks of alcohol per week. Yusuf Alberto reports that Yusuf Alberto does not use drugs.     Allergies:   Allergies   Allergen Reactions    Dust ASTHMA    Seasonal        Medications:    No current facility-administered medications on file prior to encounter.      Current Outpatient Medications on File Prior to Encounter   Medication Sig Dispense Refill    Insulin Degludec (TRESIBA FLEXTOUCH) 200 UNIT/ML Subcutaneous Solution Pen-injector Uses up to 40 units daily 18 mL 0    Insulin Lispro, 1 Unit Dial, (HUMALOG KWIKPEN) 100 UNIT/ML Subcutaneous Solution Pen-injector Uses up to 50 units daily as directed 45 mL 0    glucagon (GVOKE HYPOPEN 2-PACK) 1 MG/0.2ML Subcutaneous SUBQ injection Inject 0.2 mL (1 mg total) into the skin as needed. 0.4 mL 1    Continuous Blood Gluc Sensor (DEXCOM G7 SENSOR) Does not apply Misc 1 each Every 10 days. (Patient not taking: Reported on 12/28/2023) 3 each 3    Insulin Pen Needle (PEN NEEDLES) 32G X 4 MM Does not apply Misc 5 each daily. 500 each 1       Review of Systems:   A comprehensive review of systems was completed.    Pertinent positives and negatives noted in the HPI.    Objective:   Physical Exam:    /77 (BP Location: Right arm)   Pulse 110   Temp 98.6 °F (37 °C) (Temporal)   Resp 19   Ht 162.6 cm (5' 4\")   Wt 106 lb 11.2 oz (48.4 kg)   SpO2 99%   BMI 18.32 kg/m²   General: No acute distress, Alert  Respiratory: No rhonchi, no wheezes  Cardiovascular: S1, S2. Tachy.   Abdomen: Soft, Non-tender, non-distended, positive bowel sounds  Neuro: No new focal deficits  Extremities: No edema    Results:    Labs:      Labs Last 24 Hours:    Recent Labs   Lab 12/28/23  0508   RBC 5.91*   HGB 17.4   HCT 52.7   MCV 89.2   MCH 29.4   MCHC 33.0   RDW 12.8   NEPRELIM 9.74*   WBC 11.8*   .0       Recent Labs   Lab 12/28/23  0509 12/28/23  0909   * 272*   BUN 13 14   CREATSERUM 1.45* 1.36*   EGFRCR 69 74   CA 8.6 8.1*   ALB 4.3  --    * 135*   K 4.8 4.2    113*   CO2 5.0* 5.0*   ALKPHO 90  --    AST 13*  --    ALT 19  --    BILT 0.4  --    TP 8.4*  --        No results found for: \"PT\", \"INR\"    No results for input(s): \"TROP\", \"TROPHS\", \"CK\" in the last 168 hours.    No results for input(s): \"TROP\", \"PBNP\" in  the last 168 hours.    No results for input(s): \"PCT\" in the last 168 hours.    Imaging: Imaging data reviewed in Epic.    Assessment & Plan:      #DKA, uncontrolled DMI  -Patient ran out of insulin PTA  -Insulin gtt  -IVF  -BMP, Mg, Phos Q4H until anion gap closes  -Replace lytes PRN  -Diabetes APRN to see    #Hyponatremia, anion gap metabolic acidosis due to #1  -IVF  -Trend labs    #COVID19 positive  -Not hypoxic/febrile  -No role for treatment at this time     Plan of care discussed with patient, RN, and ED physician. >40 minutes critical care time spent.     Chidi Perry,     Supplementary Documentation:     The 21st Century Cures Act makes medical notes like these available to patients in the interest of transparency. Please be advised this is a medical document. Medical documents are intended to carry relevant information, facts as evident, and the clinical opinion of the practitioner. The medical note is intended as peer to peer communication and may appear blunt or direct. It is written in medical language and may contain abbreviations or verbiage that are unfamiliar.

## 2023-12-28 NOTE — PROGRESS NOTES
[facility] Dosing Service   Initial Renal Dosing and Drug-Drug Interaction (DDI) Review for Paxlovid (Nirmatrelvir/Ritonavir)    Yusuf Alberto is a 23 year old adult who is being initiated on Paxlovid (nirmatrelvir/ritonavir) therapy COVID-19 infection.  Pharmacy has been asked to dose and review Paxlovid DDI's by Tyra Duran.      Labs:   Recent Labs   Lab 12/28/23  0909   EGFRCR 74       Assessment/Plan:   Eligibility criteria for Paxlovid use have been met..    1) Based on the above assessment:  Recommend initiation of Paxlovid (Nirmatrelvir-ritonavir 300-100 mg) PO BID x 5 days based on GFR  2) No clinically significant DDI's exist. Pharmacy will continue to follow and monitor for DDI's for the duration of Paxlovid therapy.       Prior to Admission Medications  No current facility-administered medications on file prior to encounter.     Current Outpatient Medications on File Prior to Encounter   Medication Sig Dispense Refill    Insulin Degludec (TRESIBA FLEXTOUCH) 200 UNIT/ML Subcutaneous Solution Pen-injector Uses up to 40 units daily 18 mL 0    Insulin Lispro, 1 Unit Dial, (HUMALOG KWIKPEN) 100 UNIT/ML Subcutaneous Solution Pen-injector Uses up to 50 units daily as directed 45 mL 0    glucagon (GVOKE HYPOPEN 2-PACK) 1 MG/0.2ML Subcutaneous SUBQ injection Inject 0.2 mL (1 mg total) into the skin as needed. 0.4 mL 1    Continuous Blood Gluc Sensor (DEXCOM G7 SENSOR) Does not apply Misc 1 each Every 10 days. (Patient not taking: Reported on 12/28/2023) 3 each 3    Insulin Pen Needle (PEN NEEDLES) 32G X 4 MM Does not apply Misc 5 each daily. 500 each 1       Current Medications    Current Facility-Administered Medications:     influenza vaccine split quad (Fluzone QIV) 0.5 mL IM injection (ages 6 months to 64 years) 0.5 mL, 0.5 mL, Intramuscular, Prior to discharge    sodium chloride 0.9% infusion, , Intravenous, Continuous    glucose (Dex4) 15 GM/59ML oral liquid 15 g, 15 g, Oral, Q15 Min PRN **OR**  glucose (Glutose) 40% oral gel 15 g, 15 g, Oral, Q15 Min PRN **OR** glucose-vitamin C (Dex-4) chewable tab 4 tablet, 4 tablet, Oral, Q15 Min PRN **OR** dextrose 50% injection 50 mL, 50 mL, Intravenous, Q15 Min PRN **OR** glucose (Dex4) 15 GM/59ML oral liquid 30 g, 30 g, Oral, Q15 Min PRN **OR** glucose (Glutose) 40% oral gel 30 g, 30 g, Oral, Q15 Min PRN **OR** glucose-vitamin C (Dex-4) chewable tab 8 tablet, 8 tablet, Oral, Q15 Min PRN    insulin regular human (Novolin R, Humulin R) 100 Units in sodium chloride 0.9% 100 mL standard infusion (100 mL), 2-52 Units/hr, Intravenous, Continuous    melatonin tab 3 mg, 3 mg, Oral, Nightly PRN    heparin (Porcine) 5000 UNIT/ML injection 5,000 Units, 5,000 Units, Subcutaneous, 2 times per day    acetaminophen (Tylenol Extra Strength) tab 500 mg, 500 mg, Oral, Q4H PRN    polyethylene glycol (PEG 3350) (Miralax) 17 g oral packet 17 g, 17 g, Oral, Daily PRN    sennosides (Senokot) tab 17.2 mg, 17.2 mg, Oral, Nightly PRN    bisacodyl (Dulcolax) 10 MG rectal suppository 10 mg, 10 mg, Rectal, Daily PRN    fleet enema (Fleet) 7-19 GM/118ML rectal enema 133 mL, 1 enema, Rectal, Once PRN    ondansetron (Zofran) 4 MG/2ML injection 4 mg, 4 mg, Intravenous, Q6H PRN    prochlorperazine (Compazine) 10 MG/2ML injection 5 mg, 5 mg, Intravenous, Q8H PRN    dextrose 5%-sodium chloride 0.45% infusion, 125 mL/hr, Intravenous, Continuous PRN    nirmatrelvir-ritonavir (Paxlovid) 300-100 MG therapy pack 3 tablet, 3 tablet, Oral, BID    Discontinued Medications:  Medications Discontinued During This Encounter   Medication Reason    insulin regular human (Novolin R, Humulin R) 100 Units in sodium chloride 0.9% 100 mL standard infusion (100 mL) Physician directed    sodium chloride 0.9% infusion Physician directed       We appreciate the opportunity to assist in the care of this patient.     Tamara Maldonado, PharmD  12/28/2023  2:25 PM  [facility] Extension: [phone]

## 2023-12-28 NOTE — ED INITIAL ASSESSMENT (HPI)
Patient arrives per EMS for difficulty breathing, fast heart rate, nausea/vomiting. Onset 1000 am yesterday. Patient arrives with IV fluids infusing. Patient states his humalog prescription ran out a couple days before chrismas and has not been able to get a new order. Patient reports last blood sugar was 288 per EMS. Patient does not recall the last time he checked his blood sugar and no longer uses a DEXCOM device.

## 2023-12-28 NOTE — ED QUICK NOTES
Pt sleeping but easily aroused to verbal stimuli, skin w/d,+ kussmals respirations.    Pt appears comfortable. NS and insulin drip infusing via pump per order.

## 2023-12-28 NOTE — CONSULTS
[facility]  Diabetes Clinical Nurse Specialist Consult Note    Yusufgrayson Alberto Patient Status:  Inpatient    2000 MRN [id number]   Location [facility] 4SW-A Attending Chidi Perry,    Hosp Day # 0 PCP Chiki Hernandez MD     Reason for Consult:   DM1, DKA    Diagnosis:    Patient Active Problem List   Diagnosis    Uncontrolled type 1 diabetes mellitus with hyperglycemia (HCC)    Type 1 diabetes mellitus with hyperglycemia (HCC)    Mixed hyperlipidemia    Hyponatremia    Acute kidney injury (HCC)    Metabolic acidosis    Hyperglycemia    Type 1 diabetes mellitus with ketoacidosis without coma (HCC)    COVID     Labs:    Recent Labs   Lab 23  0903 23  0958 23  1105 23  1228 23  1353   PGLU 260* 214* 192* 203* 201*     Recent Labs   Lab 23  0509 23  0909   CREATSERUM 1.45* 1.36*     HGBA1C:    Lab Results   Component Value Date    A1C 16.1 (H) 2023    A1C >16.4 (H) 2020    A1C 12.0 (H) 2019     (H) 2023     Discussion:    Pt is 23 YOM who prevented via EMS with tachycardia, N/V. Pt with DM1, diagnosed in . Serum glucose is 319, CO2 5, anion gap 17, A1C pending, previous on 3/2/2023 was 16.1. He is a patient of Dr. Hernandez (PCP) and NP Cecilia Fleming. He was a patient of [facility] endocrinology in . Established care with Cecilia Fleming 2023.     He is also + for Covid, will be started on Paxlovid in the hospital.    Apparently pt ran out of insulin a few days ago.     Had video visit with JAMIE Fleming on 2023. At that time insulin regimen was to be Tresiba U200 25 units, and Humalog (Carb ration 1:10, and sensitivity, correct if > 160). Has also been prescribed GVOKE glucagon, but apparently pt declined due to cost. He does not test his blood glucose regularly and does not currently use a CGM. Possible plan for a pump at some point.     I will see pt on 2023.     ADDENDUM:  Pt still acidotic, CO2 now 13 (initially wa  5).    Met with patient, reviewed complications including neuropathy and retinopathy. He may be developing pedal neuropathy, said feet hurt not necessarily numbness and tinglind. He admits to having some episodes of hypoglycemia, reviewed treatment and asked whether he got a Gvoke glucagon syringe or not. He said he thinks so. We also discussed his inability to get Humalog refilled (all I can think of is that he has not followed up with providers). I explained he can also get Relion regular insulin OTC at least to keep him out of DKA.     He expressed frustation that his A1C was so high. I discussed that he has to see his doctors regularly, take his insulin regularly, pursue getting a CGM (he said a Dexcom was not covered at one point), and work toward getting a pump.Explained the latest AID (assisted insulin delivery) technology to his patient (Meena, Omnipod 5) . He was not aware.    Pt seems to lack motivation in keeping up with dr.appointments, ensuring he has enough insulin without running out, and pursuing getting a CGM, possibly due to depression.     Assessment/Plan:  Dx: DM1, DKA  Insulin drip   Copay coupon for Gvoke glucagon if not adequately covered by insurance  Relion pamphlet for OTC insulin if ran out and can't refill for some reason.  Education as above  Dietitian consult for introduction to carbohydrate counting and meal planning  Discharge to [facility]   F/u appointment at the [facility] ([place]) with Cecilia Fleming NP, Jan 8, 2024, at 1030AM.     PRESCRIPTION RECOMMENDATIONS:    [facility]   Current  [employer] [facility]  PATIENT NEEDS A PRESCRIPTION FOR HUMALOG ON DISCHARGE    PROVIDER F/U RECOMMENDATIONS:    Patient's current PCP (Dr. Chiki Hernandez)  [facility] (JAMIE Fleming) Appt made for 1/8/2024.1030AM      Silvina Stratton, MSN, APN, ACNS-BC, BC-ADM  Clinical Nurse Specialist  Diabetes Educator  12/28/2023  2:09  PM

## 2023-12-29 LAB
ANION GAP SERPL CALC-SCNC: 11 MMOL/L (ref 0–18)
ANION GAP SERPL CALC-SCNC: 11 MMOL/L (ref 0–18)
ANION GAP SERPL CALC-SCNC: 6 MMOL/L (ref 0–18)
ANION GAP SERPL CALC-SCNC: 6 MMOL/L (ref 0–18)
ANION GAP SERPL CALC-SCNC: 9 MMOL/L (ref 0–18)
ANION GAP SERPL CALC-SCNC: 9 MMOL/L (ref 0–18)
BUN BLD-MCNC: 10 MG/DL (ref 9–23)
BUN BLD-MCNC: 11 MG/DL (ref 9–23)
BUN BLD-MCNC: 6 MG/DL (ref 9–23)
BUN BLD-MCNC: 7 MG/DL (ref 9–23)
BUN BLD-MCNC: 8 MG/DL (ref 9–23)
BUN BLD-MCNC: 9 MG/DL (ref 9–23)
CALCIUM BLD-MCNC: 7.2 MG/DL (ref 8.5–10.1)
CALCIUM BLD-MCNC: 7.3 MG/DL (ref 8.5–10.1)
CALCIUM BLD-MCNC: 7.5 MG/DL (ref 8.5–10.1)
CALCIUM BLD-MCNC: 7.7 MG/DL (ref 8.5–10.1)
CALCIUM BLD-MCNC: 7.9 MG/DL (ref 8.5–10.1)
CALCIUM BLD-MCNC: 8.1 MG/DL (ref 8.5–10.1)
CHLORIDE SERPL-SCNC: 115 MMOL/L (ref 98–112)
CHLORIDE SERPL-SCNC: 116 MMOL/L (ref 98–112)
CHLORIDE SERPL-SCNC: 116 MMOL/L (ref 98–112)
CHLORIDE SERPL-SCNC: 117 MMOL/L (ref 98–112)
CO2 SERPL-SCNC: 11 MMOL/L (ref 21–32)
CO2 SERPL-SCNC: 11 MMOL/L (ref 21–32)
CO2 SERPL-SCNC: 13 MMOL/L (ref 21–32)
CO2 SERPL-SCNC: 16 MMOL/L (ref 21–32)
CO2 SERPL-SCNC: 17 MMOL/L (ref 21–32)
CO2 SERPL-SCNC: 17 MMOL/L (ref 21–32)
CREAT BLD-MCNC: 0.77 MG/DL
CREAT BLD-MCNC: 0.84 MG/DL
CREAT BLD-MCNC: 0.93 MG/DL
CREAT BLD-MCNC: 0.96 MG/DL
CREAT BLD-MCNC: 1.05 MG/DL
CREAT BLD-MCNC: 1.18 MG/DL
EGFRCR SERPLBLD CKD-EPI 2021: 101 ML/MIN/1.73M2 (ref 60–?)
EGFRCR SERPLBLD CKD-EPI 2021: 112 ML/MIN/1.73M2 (ref 60–?)
EGFRCR SERPLBLD CKD-EPI 2021: 117 ML/MIN/1.73M2 (ref 60–?)
EGFRCR SERPLBLD CKD-EPI 2021: 124 ML/MIN/1.73M2 (ref 60–?)
EGFRCR SERPLBLD CKD-EPI 2021: 127 ML/MIN/1.73M2 (ref 60–?)
EGFRCR SERPLBLD CKD-EPI 2021: 88 ML/MIN/1.73M2 (ref 60–?)
ERYTHROCYTE [DISTWIDTH] IN BLOOD BY AUTOMATED COUNT: 13.1 %
GLUCOSE BLD-MCNC: 120 MG/DL (ref 70–99)
GLUCOSE BLD-MCNC: 123 MG/DL (ref 70–99)
GLUCOSE BLD-MCNC: 125 MG/DL (ref 70–99)
GLUCOSE BLD-MCNC: 130 MG/DL (ref 70–99)
GLUCOSE BLD-MCNC: 131 MG/DL (ref 70–99)
GLUCOSE BLD-MCNC: 132 MG/DL (ref 70–99)
GLUCOSE BLD-MCNC: 132 MG/DL (ref 70–99)
GLUCOSE BLD-MCNC: 133 MG/DL (ref 70–99)
GLUCOSE BLD-MCNC: 134 MG/DL (ref 70–99)
GLUCOSE BLD-MCNC: 134 MG/DL (ref 70–99)
GLUCOSE BLD-MCNC: 137 MG/DL (ref 70–99)
GLUCOSE BLD-MCNC: 137 MG/DL (ref 70–99)
GLUCOSE BLD-MCNC: 138 MG/DL (ref 70–99)
GLUCOSE BLD-MCNC: 140 MG/DL (ref 70–99)
GLUCOSE BLD-MCNC: 141 MG/DL (ref 70–99)
GLUCOSE BLD-MCNC: 144 MG/DL (ref 70–99)
GLUCOSE BLD-MCNC: 145 MG/DL (ref 70–99)
GLUCOSE BLD-MCNC: 145 MG/DL (ref 70–99)
GLUCOSE BLD-MCNC: 147 MG/DL (ref 70–99)
GLUCOSE BLD-MCNC: 148 MG/DL (ref 70–99)
GLUCOSE BLD-MCNC: 148 MG/DL (ref 70–99)
GLUCOSE BLD-MCNC: 153 MG/DL (ref 70–99)
GLUCOSE BLD-MCNC: 156 MG/DL (ref 70–99)
GLUCOSE BLD-MCNC: 156 MG/DL (ref 70–99)
GLUCOSE BLD-MCNC: 158 MG/DL (ref 70–99)
GLUCOSE BLD-MCNC: 161 MG/DL (ref 70–99)
GLUCOSE BLD-MCNC: 187 MG/DL (ref 70–99)
GLUCOSE BLD-MCNC: 206 MG/DL (ref 70–99)
HCT VFR BLD AUTO: 42 %
HGB BLD-MCNC: 14.7 G/DL
MAGNESIUM SERPL-MCNC: 1.6 MG/DL (ref 1.6–2.6)
MAGNESIUM SERPL-MCNC: 1.8 MG/DL (ref 1.6–2.6)
MAGNESIUM SERPL-MCNC: 1.9 MG/DL (ref 1.6–2.6)
MCH RBC QN AUTO: 29.4 PG (ref 26–34)
MCHC RBC AUTO-ENTMCNC: 35 G/DL (ref 31–37)
MCV RBC AUTO: 84 FL
OSMOLALITY SERPL CALC.SUM OF ELEC: 289 MOSM/KG (ref 275–295)
OSMOLALITY SERPL CALC.SUM OF ELEC: 290 MOSM/KG (ref 275–295)
OSMOLALITY SERPL CALC.SUM OF ELEC: 291 MOSM/KG (ref 275–295)
OSMOLALITY SERPL CALC.SUM OF ELEC: 292 MOSM/KG (ref 275–295)
PHOSPHATE SERPL-MCNC: 0.9 MG/DL (ref 2.5–4.9)
PHOSPHATE SERPL-MCNC: 1 MG/DL (ref 2.5–4.9)
PHOSPHATE SERPL-MCNC: 1.1 MG/DL (ref 2.5–4.9)
PHOSPHATE SERPL-MCNC: 1.6 MG/DL (ref 2.5–4.9)
PHOSPHATE SERPL-MCNC: 1.7 MG/DL (ref 2.5–4.9)
PHOSPHATE SERPL-MCNC: 2.3 MG/DL (ref 2.5–4.9)
PHOSPHATE SERPL-MCNC: 2.6 MG/DL (ref 2.5–4.9)
PLATELET # BLD AUTO: 179 10(3)UL (ref 150–450)
POTASSIUM SERPL-SCNC: 2.8 MMOL/L (ref 3.5–5.1)
POTASSIUM SERPL-SCNC: 3 MMOL/L (ref 3.5–5.1)
POTASSIUM SERPL-SCNC: 3.1 MMOL/L (ref 3.5–5.1)
POTASSIUM SERPL-SCNC: 3.3 MMOL/L (ref 3.5–5.1)
POTASSIUM SERPL-SCNC: 3.3 MMOL/L (ref 3.5–5.1)
POTASSIUM SERPL-SCNC: 3.8 MMOL/L (ref 3.5–5.1)
RBC # BLD AUTO: 5 X10(6)UL
SODIUM SERPL-SCNC: 138 MMOL/L (ref 136–145)
SODIUM SERPL-SCNC: 139 MMOL/L (ref 136–145)
SODIUM SERPL-SCNC: 141 MMOL/L (ref 136–145)
WBC # BLD AUTO: 5.5 X10(3) UL (ref 4–11)

## 2023-12-29 PROCEDURE — 99233 SBSQ HOSP IP/OBS HIGH 50: CPT | Performed by: INTERNAL MEDICINE

## 2023-12-29 PROCEDURE — 99233 SBSQ HOSP IP/OBS HIGH 50: CPT | Performed by: CLINICAL NURSE SPECIALIST

## 2023-12-29 PROCEDURE — 99233 SBSQ HOSP IP/OBS HIGH 50: CPT | Performed by: HOSPITALIST

## 2023-12-29 PROCEDURE — 99358 PROLONG SERVICE W/O CONTACT: CPT | Performed by: CLINICAL NURSE SPECIALIST

## 2023-12-29 RX ORDER — SODIUM CHLORIDE 450 MG/100ML
INJECTION, SOLUTION INTRAVENOUS CONTINUOUS
Status: DISCONTINUED | OUTPATIENT
Start: 2023-12-29 | End: 2023-12-30

## 2023-12-29 RX ORDER — MAGNESIUM SULFATE HEPTAHYDRATE 40 MG/ML
2 INJECTION, SOLUTION INTRAVENOUS ONCE
Status: COMPLETED | OUTPATIENT
Start: 2023-12-29 | End: 2023-12-29

## 2023-12-29 RX ORDER — INSULIN LISPRO 100 [IU]/ML
INJECTION, SOLUTION INTRAVENOUS; SUBCUTANEOUS
Qty: 15 ML | Refills: 0 | Status: SHIPPED | OUTPATIENT
Start: 2023-12-29 | End: 2023-12-31

## 2023-12-29 RX ORDER — ENOXAPARIN SODIUM 100 MG/ML
40 INJECTION SUBCUTANEOUS NIGHTLY
Status: DISCONTINUED | OUTPATIENT
Start: 2023-12-29 | End: 2024-01-01

## 2023-12-29 RX ORDER — POTASSIUM CHLORIDE 14.9 MG/ML
20 INJECTION INTRAVENOUS ONCE
Status: COMPLETED | OUTPATIENT
Start: 2023-12-29 | End: 2023-12-30

## 2023-12-29 NOTE — DISCHARGE INSTRUCTIONS
Understanding Carbohydrates  A car needs the right type of fuel to run. And you need the right kind of food to function. To keep your energy level up, your body needs food that has carbohydrates (carbs). But carbs raise blood sugar levels higher and faster than other kinds of food. Your dietitian will work with you to figure out the amount of carbs you need. Carbs come in 3 types: starches, sugars, and fiber.   Starches  Starches are found in grains, some vegetables, and beans. Grain products include bread, pasta, cereal, and tortillas. Starchy vegetables include potatoes, peas, corn, lima beans, yams, and squash. Kidney beans, miranda beans, black beans, garbanzo beans, and lentils also have starches.     Sugars  Sugars are found naturally in many foods. Or they can be added. Foods that contain natural sugar include fruits and fruit juices, dairy products, honey, and molasses. Added sugars are found in most desserts, processed foods, candy, regular soda, and fruit drinks. These are very helpful to treat low blood sugar (hypoglycemia). They give you sugar quickly. Try to keep at least 15 to 20 grams of these simple sugars with you at all times. Eat or drink these if you start to have symptoms of low blood sugar.   Fiber  Fiber comes from plant foods. Your body can't digest most fiber. Instead of raising blood sugar levels like other carbs, fiber stops blood sugar from rising too fast. Fiber is found in fruits, vegetables, whole grains, beans, peas, and many nuts.   Carb counting  Keep track of the amount of carbs you eat. This can help you keep the right balance of carbs, physical activity, and medicine. The amount of carbs you need will be different from what other people need. How much you need depends on many things. These include your health, the medicines you take, and how active you are. Your healthcare team will help you figure out the right amount of carbs for you. You may start with 45 to 60 grams of carbs  per meal, depending on your case. Carb counting is a system that helps you keep track of the carbohydrates you eat at each meal.   Carbs come from many foods. These include grains, starchy vegetables, fruit, milk, beans, and snack foods. You can either count carbohydrate grams or carbohydrate servings. When you count carbohydrate servings, 1 carbohydrate serving = 15 grams of carbohydrates.   Here are some examples of foods that have about 15 grams of carbs (1 serving of carbohydrates):   1/2 cup of canned or frozen fruit  A small piece of fresh fruit (4 ounces)  1 slice of bread  1/2 cup of oatmeal  1/3 cup of rice  4 to 6 crackers  1/2 English muffin  1/2 cup of black beans  1/4 of a large baked potato (3 ounces)  2/3 cup of plain fat-free yogurt  1 cup of soup  1/2 cup of casserole  6 chicken nuggets  2-inch-square brownie or cake without frosting  2 small cookies  1/2 cup of ice cream or sherbet  Carb counting is easier when food labels are available. Look at the label to see how many grams of total carbs per serving the food contains. Then you can figure out how much you should eat. If your food doesn't have a nutrition label, you should be able to get an idea how many carbs there are per serving by using a book or website.   Two very important lines to look at on the label are the serving size and the total carbohydrate amount per serving. Here are some tips for using food labels to count your carbs:   Check the serving size. The information on the label is based on that serving size. If you eat more than the listed serving size, you may have to double or triple the other information on the label.   Check the total grams of carbs.  Total carbohydrate from the label includes sugar, starch, and fiber. Be sure to use the total carbohydrate number (minus the fiber) and not sugar alone.  Know how many grams of carbs you can have.  Be familiar with the matching portion sizes.  Compare labels. Compare the labels of  different products. Look at serving sizes and total carbs to find the products that work best for you.   Don't forget protein and fat. With the focus on carb counting, it might be easy to forget protein and fat in your meals. Don't forget to include sources of protein and healthy fat to balance your meals. Also watch how much salt (sodium) you eat. This is especially true if you have high blood pressure. If you have diabetes, limit the amount of sodium to less than 2,300 mg a day.    It’s also important to be consistent with the amount of carbs and time you eat when taking a fixed dose of diabetes medicine. Work with your healthcare provider or dietitian if you need more help. They can help you keep track of your carbs. They can also help you figure out how many grams of carbs you should have.   Cha last reviewed this educational content on 12/1/2021 © 2000-2023 [employer]. All rights reserved. This information is not intended as a substitute for professional medical care. Always follow your healthcare professional's instructions.

## 2023-12-29 NOTE — PROGRESS NOTES
[facility]     Hospitalist Progress Note     Yusuf Alberto Patient Status:  Inpatient    2000 MRN [id number]   Location [facility] 4SW-A Attending Chidi Perry DO   Hosp Day # 1 PCP Chiki Hernandez MD     Chief Complaint: Elevated heart rate    Subjective:     Patient seen and examined. Denies CP/SOB/N/V/D.     Objective:    Review of Systems:   A comprehensive review of systems was completed; pertinent positive and negatives stated in subjective.    Vital signs:  Temp:  [97.9 °F (36.6 °C)-99 °F (37.2 °C)] 97.9 °F (36.6 °C)  Pulse:  [] 88  Resp:  [13-17] 16  BP: (101-133)/(61-82) 118/75  SpO2:  [96 %-99 %] 97 %    Physical Exam:    General: No acute distress  Respiratory: no wheezes, no rhonchi  Cardiovascular: S1, S2, regular rate and rhythm  Abdomen: Soft, Non-tender, non-distended, positive bowel sounds  Neuro: No new focal deficits.   Extremities: no edema    Diagnostic Data:    Labs:  Recent Labs   Lab 23  0508 23  0451   WBC 11.8* 5.5   HGB 17.4 14.7   MCV 89.2 84.0   .0 179.0       Recent Labs   Lab 23  0509 23  0909 23  0451 23  0811 23  1249   *   < > 161* 153* 156*   BUN 13   < > 10 9 8*   CREATSERUM 1.45*   < > 1.05 0.96 0.93   CA 8.6   < > 7.7* 7.9* 7.2*   ALB 4.3  --   --   --   --    *   < > 139 139 139   K 4.8   < > 3.3* 3.1* 3.3*      < > 117* 117* 117*   CO2 5.0*   < > 11.0* 13.0* 16.0*   ALKPHO 90  --   --   --   --    AST 13*  --   --   --   --    ALT 19  --   --   --   --    BILT 0.4  --   --   --   --    TP 8.4*  --   --   --   --     < > = values in this interval not displayed.       Estimated Creatinine Clearance: 89.7 mL/min (based on SCr of 0.93 mg/dL).    No results for input(s): \"TROP\", \"TROPHS\", \"CK\" in the last 168 hours.    No results for input(s): \"PTP\", \"INR\" in the last 168 hours.               Microbiology    No results found for this visit on 23.      Imaging: Reviewed in  Epic.    Medications:    sodium phosphate  15 mmol Intravenous Once    enoxaparin  40 mg Subcutaneous Nightly    nirmatrelvir-ritonavir  3 tablet Oral BID       Assessment & Plan:      #DKA, uncontrolled DMI  -Patient ran out of insulin PTA  -Anion gap closed but still acidotic   -Continue Insulin gtt  -IVF  -BMP, Mg, Phos Q4H   -Replace lytes PRN  -Diabetes APRN following     #Hyponatremia, anion gap metabolic acidosis due to #1  -Resolved      #COVID19 positive  -Paxlovid per pulm     Chidi Perry,     Supplementary Documentation:     Quality:  DVT Mechanical Prophylaxis:     Early ambuation  DVT Pharmacologic Prophylaxis   Medication    enoxaparin (Lovenox) 40 MG/0.4ML SUBQ injection 40 mg                Code Status: Full Code  Diamond: No urinary catheter in place  Diamond Duration (in days):   Central line:    TREVOR:     Discharge is dependent on: clinical progress  At this point Mr. Alberto is expected to be discharge to: home    The 21st Century Cures Act makes medical notes like these available to patients in the interest of transparency. Please be advised this is a medical document. Medical documents are intended to carry relevant information, facts as evident, and the clinical opinion of the practitioner. The medical note is intended as peer to peer communication and may appear blunt or direct. It is written in medical language and may contain abbreviations or verbiage that are unfamiliar.

## 2023-12-29 NOTE — PROGRESS NOTES
Critical Care Progress Note     Assessment / Plan:  DKA - due to running out of insulin  - continue insulin gtt  - BMP and electrolytes 14 hours  - anion gap closed, but bicarb remains low, will continue on insulin gtt for now and consider transition this afternoon pending repeat labs  COVID - presented with DKA but no other symptoms of COVID  - paxlovid  FEN  - IVF  - NPO on insulin gtt  - replete lytes per protocol  Ppx  - lovenox  - SCDs  Dispo  - full code  - ICU until transitioned off insulin gtt    Barney Frank MD  Pulmonary & Critical Care Medicine  [facility]      Subjective:  No acute events overnight  Remains on insulin gtt  Feeling better today  Denies dyspnea and chest pain  Occasional nausea    Objective:  Vitals:    12/29/23 1100 12/29/23 1200 12/29/23 1300 12/29/23 1400   BP:  101/61  118/75   BP Location:  Right arm     Pulse: 91 92 89 88   Resp: 14 15 15 16   Temp:  97.9 °F (36.6 °C)     TempSrc:  Temporal     SpO2: 97% 98% 97% 97%   Weight:       Height:         Physical Exam:  General: laying in bed  Respiratory: clear bilaterally, normal effort  Cardiovascular: regular rate and rhythm, no m/r/g  Abdomen: soft, NTND  Extremities: no LE edema  Mental status: interactive, answering questions appropriately    Medications:  Reviewed in EMR    Lab Data:  Reviewed in EMR    Imaging:  I independently visualized all relevant chest imaging in PACS and agree with radiology interpretation except where noted.

## 2023-12-29 NOTE — DIETARY NOTE
[facility]   CLINICAL NUTRITION    Pt chart reviewed for elevated A1C of 14.8% (pt with T1DM and ran out of insulin PTA per report and now in DKA). Noted pt also with COVID and unable to visit at bedside. Consult for Diabetes APN to see pt. Will upload Understanding Carbohydrates handout into discharge instructions. Will follow up as appropriate for full nutrition assessment.    Please consult if patient status changes or nutrition issues arise.    Cely Hugo RD, LDN, [facility]  Clinical Dietitian  Spectra: [phone]   Upon discharge, RA -- Jennifer, states to call her for a ride. [phone]

## 2023-12-29 NOTE — PLAN OF CARE
Patient received awake, alert and oriented x4. Patient denies SOB, pain, denies nausea. No signs of distress noted. Patient remains on insulin gtt per protocol. Please see MAR/flowsheet/lab results. Patient started on clear liquid diet, poor appetite noted. Plan of care discussed with patient. All needs met. Will continue to monitor.

## 2023-12-29 NOTE — PLAN OF CARE
Continued on insulin drip per DKA protocol with q1 h accu checks and q4h labs. Electrolytes replaced per protocol.  Problem: Diabetes/Glucose Control  Goal: Glucose maintained within prescribed range  Description: INTERVENTIONS:  - Monitor Blood Glucose as ordered  - Assess for signs and symptoms of hyperglycemia and hypoglycemia  - Administer ordered medications to maintain glucose within target range  - Assess barriers to adequate nutritional intake and initiate nutrition consult as needed  - Instruct patient on self management of diabetes  Outcome: Progressing     Problem: RESPIRATORY - ADULT  Goal: Achieves optimal ventilation and oxygenation  Description: INTERVENTIONS:  - Assess for changes in respiratory status  - Assess for changes in mentation and behavior  - Position to facilitate oxygenation and minimize respiratory effort  - Oxygen supplementation based on oxygen saturation or ABGs  - Provide Smoking Cessation handout, if applicable  - Encourage broncho-pulmonary hygiene including cough, deep breathe, Incentive Spirometry  - Assess the need for suctioning and perform as needed  - Assess and instruct to report SOB or any respiratory difficulty  - Respiratory Therapy support as indicated  - Manage/alleviate anxiety  - Monitor for signs/symptoms of CO2 retention  Outcome: Progressing     Problem: GASTROINTESTINAL - ADULT  Goal: Minimal or absence of nausea and vomiting  Description: INTERVENTIONS:  - Maintain adequate hydration with IV or PO as ordered and tolerated  - Nasogastric tube to low intermittent suction as ordered  - Evaluate effectiveness of ordered antiemetic medications  - Provide nonpharmacologic comfort measures as appropriate  - Advance diet as tolerated, if ordered  - Obtain nutritional consult as needed  - Evaluate fluid balance  Outcome: Progressing     Problem: METABOLIC/FLUID AND ELECTROLYTES - ADULT  Goal: Electrolytes maintained within normal limits  Description: INTERVENTIONS:  -  Monitor labs and rhythm and assess patient for signs and symptoms of electrolyte imbalances  - Administer electrolyte replacement as ordered  - Monitor response to electrolyte replacements, including rhythm and repeat lab results as appropriate  - Fluid restriction as ordered  - Instruct patient on fluid and nutrition restrictions as appropriate  Outcome: Not Progressing  Goal: Hemodynamic stability and optimal renal function maintained  Description: INTERVENTIONS:  - Monitor labs and assess for signs and symptoms of volume excess or deficit  - Monitor intake, output and patient weight  - Monitor urine specific gravity, serum osmolarity and serum sodium as indicated or ordered  - Monitor response to interventions for patient's volume status, including labs, urine output, blood pressure (other measures as available)  - Encourage oral intake as appropriate  - Instruct patient on fluid and nutrition restrictions as appropriate  Outcome: Progressing

## 2023-12-29 NOTE — CM/SW NOTE
12/29/23 1500   CM/SW Referral Data   Referral Source    Reason for Referral Other   Informant Patient     [facility] regarding transportation. Pt stating that he has roomates and they are able to provide rides when needed. Pt also uses Lyft and uber.     / to remain available for support and/or discharge planning.

## 2023-12-30 ENCOUNTER — APPOINTMENT (OUTPATIENT)
Dept: GENERAL RADIOLOGY | Facility: HOSPITAL | Age: 23
End: 2023-12-30
Payer: COMMERCIAL

## 2023-12-30 LAB
ANION GAP SERPL CALC-SCNC: 3 MMOL/L (ref 0–18)
ANION GAP SERPL CALC-SCNC: 5 MMOL/L (ref 0–18)
ANION GAP SERPL CALC-SCNC: 5 MMOL/L (ref 0–18)
ANION GAP SERPL CALC-SCNC: 6 MMOL/L (ref 0–18)
ANION GAP SERPL CALC-SCNC: 7 MMOL/L (ref 0–18)
ANION GAP SERPL CALC-SCNC: 7 MMOL/L (ref 0–18)
BASOPHILS # BLD AUTO: 0.01 X10(3) UL (ref 0–0.2)
BASOPHILS NFR BLD AUTO: 0.3 %
BUN BLD-MCNC: 1 MG/DL (ref 9–23)
BUN BLD-MCNC: 2 MG/DL (ref 9–23)
BUN BLD-MCNC: 3 MG/DL (ref 9–23)
BUN BLD-MCNC: 3 MG/DL (ref 9–23)
BUN BLD-MCNC: 4 MG/DL (ref 9–23)
BUN BLD-MCNC: 5 MG/DL (ref 9–23)
CALCIUM BLD-MCNC: 7.4 MG/DL (ref 8.5–10.1)
CALCIUM BLD-MCNC: 7.4 MG/DL (ref 8.5–10.1)
CALCIUM BLD-MCNC: 7.5 MG/DL (ref 8.5–10.1)
CALCIUM BLD-MCNC: 7.7 MG/DL (ref 8.5–10.1)
CALCIUM BLD-MCNC: 7.7 MG/DL (ref 8.5–10.1)
CALCIUM BLD-MCNC: 7.9 MG/DL (ref 8.5–10.1)
CHLORIDE SERPL-SCNC: 113 MMOL/L (ref 98–112)
CHLORIDE SERPL-SCNC: 114 MMOL/L (ref 98–112)
CHLORIDE SERPL-SCNC: 115 MMOL/L (ref 98–112)
CHLORIDE SERPL-SCNC: 116 MMOL/L (ref 98–112)
CO2 SERPL-SCNC: 19 MMOL/L (ref 21–32)
CO2 SERPL-SCNC: 20 MMOL/L (ref 21–32)
CO2 SERPL-SCNC: 21 MMOL/L (ref 21–32)
CO2 SERPL-SCNC: 21 MMOL/L (ref 21–32)
CO2 SERPL-SCNC: 23 MMOL/L (ref 21–32)
CO2 SERPL-SCNC: 24 MMOL/L (ref 21–32)
CREAT BLD-MCNC: 0.6 MG/DL
CREAT BLD-MCNC: 0.64 MG/DL
CREAT BLD-MCNC: 0.64 MG/DL
CREAT BLD-MCNC: 0.67 MG/DL
CREAT BLD-MCNC: 0.91 MG/DL
EGFRCR SERPLBLD CKD-EPI 2021: 120 ML/MIN/1.73M2 (ref 60–?)
EGFRCR SERPLBLD CKD-EPI 2021: 133 ML/MIN/1.73M2 (ref 60–?)
EGFRCR SERPLBLD CKD-EPI 2021: 135 ML/MIN/1.73M2 (ref 60–?)
EGFRCR SERPLBLD CKD-EPI 2021: 135 ML/MIN/1.73M2 (ref 60–?)
EGFRCR SERPLBLD CKD-EPI 2021: 137 ML/MIN/1.73M2 (ref 60–?)
EOSINOPHIL # BLD AUTO: 0.04 X10(3) UL (ref 0–0.7)
EOSINOPHIL NFR BLD AUTO: 1.1 %
ERYTHROCYTE [DISTWIDTH] IN BLOOD BY AUTOMATED COUNT: 13.4 %
GLUCOSE BLD-MCNC: 108 MG/DL (ref 70–99)
GLUCOSE BLD-MCNC: 117 MG/DL (ref 70–99)
GLUCOSE BLD-MCNC: 117 MG/DL (ref 70–99)
GLUCOSE BLD-MCNC: 124 MG/DL (ref 70–99)
GLUCOSE BLD-MCNC: 126 MG/DL (ref 70–99)
GLUCOSE BLD-MCNC: 128 MG/DL (ref 70–99)
GLUCOSE BLD-MCNC: 130 MG/DL (ref 70–99)
GLUCOSE BLD-MCNC: 132 MG/DL (ref 70–99)
GLUCOSE BLD-MCNC: 132 MG/DL (ref 70–99)
GLUCOSE BLD-MCNC: 139 MG/DL (ref 70–99)
GLUCOSE BLD-MCNC: 141 MG/DL (ref 70–99)
GLUCOSE BLD-MCNC: 142 MG/DL (ref 70–99)
GLUCOSE BLD-MCNC: 144 MG/DL (ref 70–99)
GLUCOSE BLD-MCNC: 156 MG/DL (ref 70–99)
GLUCOSE BLD-MCNC: 164 MG/DL (ref 70–99)
GLUCOSE BLD-MCNC: 165 MG/DL (ref 70–99)
HCT VFR BLD AUTO: 38.8 %
HGB BLD-MCNC: 13.7 G/DL
IMM GRANULOCYTES # BLD AUTO: 0.01 X10(3) UL (ref 0–1)
IMM GRANULOCYTES NFR BLD: 0.3 %
LYMPHOCYTES # BLD AUTO: 1.11 X10(3) UL (ref 1–4)
LYMPHOCYTES NFR BLD AUTO: 30.1 %
MAGNESIUM SERPL-MCNC: 1.8 MG/DL (ref 1.6–2.6)
MAGNESIUM SERPL-MCNC: 2 MG/DL (ref 1.6–2.6)
MAGNESIUM SERPL-MCNC: 2.1 MG/DL (ref 1.6–2.6)
MAGNESIUM SERPL-MCNC: 2.1 MG/DL (ref 1.6–2.6)
MAGNESIUM SERPL-MCNC: 2.2 MG/DL (ref 1.6–2.6)
MAGNESIUM SERPL-MCNC: 2.2 MG/DL (ref 1.6–2.6)
MCH RBC QN AUTO: 29.4 PG (ref 26–34)
MCHC RBC AUTO-ENTMCNC: 35.3 G/DL (ref 31–37)
MCV RBC AUTO: 83.3 FL
MONOCYTES # BLD AUTO: 0.55 X10(3) UL (ref 0.1–1)
MONOCYTES NFR BLD AUTO: 14.9 %
NEUTROPHILS # BLD AUTO: 1.97 X10 (3) UL (ref 1.5–7.7)
NEUTROPHILS # BLD AUTO: 1.97 X10(3) UL (ref 1.5–7.7)
NEUTROPHILS NFR BLD AUTO: 53.3 %
OSMOLALITY SERPL CALC.SUM OF ELEC: 288 MOSM/KG (ref 275–295)
OSMOLALITY SERPL CALC.SUM OF ELEC: 290 MOSM/KG (ref 275–295)
OSMOLALITY SERPL CALC.SUM OF ELEC: 291 MOSM/KG (ref 275–295)
OSMOLALITY SERPL CALC.SUM OF ELEC: 294 MOSM/KG (ref 275–295)
OSMOLALITY SERPL CALC.SUM OF ELEC: 295 MOSM/KG (ref 275–295)
OSMOLALITY SERPL CALC.SUM OF ELEC: 298 MOSM/KG (ref 275–295)
PHOSPHATE SERPL-MCNC: 1.1 MG/DL (ref 2.5–4.9)
PHOSPHATE SERPL-MCNC: 1.7 MG/DL (ref 2.5–4.9)
PHOSPHATE SERPL-MCNC: 1.8 MG/DL (ref 2.5–4.9)
PHOSPHATE SERPL-MCNC: 2 MG/DL (ref 2.5–4.9)
PHOSPHATE SERPL-MCNC: 2.2 MG/DL (ref 2.5–4.9)
PHOSPHATE SERPL-MCNC: 2.9 MG/DL (ref 2.5–4.9)
PLATELET # BLD AUTO: 167 10(3)UL (ref 150–450)
POTASSIUM SERPL-SCNC: 2.8 MMOL/L (ref 3.5–5.1)
POTASSIUM SERPL-SCNC: 2.9 MMOL/L (ref 3.5–5.1)
POTASSIUM SERPL-SCNC: 3 MMOL/L (ref 3.5–5.1)
POTASSIUM SERPL-SCNC: 3.2 MMOL/L (ref 3.5–5.1)
POTASSIUM SERPL-SCNC: 3.3 MMOL/L (ref 3.5–5.1)
POTASSIUM SERPL-SCNC: 3.6 MMOL/L (ref 3.5–5.1)
RBC # BLD AUTO: 4.66 X10(6)UL
SODIUM SERPL-SCNC: 139 MMOL/L (ref 136–145)
SODIUM SERPL-SCNC: 140 MMOL/L (ref 136–145)
SODIUM SERPL-SCNC: 141 MMOL/L (ref 136–145)
SODIUM SERPL-SCNC: 142 MMOL/L (ref 136–145)
SODIUM SERPL-SCNC: 143 MMOL/L (ref 136–145)
SODIUM SERPL-SCNC: 144 MMOL/L (ref 136–145)
WBC # BLD AUTO: 3.7 X10(3) UL (ref 4–11)

## 2023-12-30 PROCEDURE — 99233 SBSQ HOSP IP/OBS HIGH 50: CPT | Performed by: INTERNAL MEDICINE

## 2023-12-30 PROCEDURE — 99233 SBSQ HOSP IP/OBS HIGH 50: CPT | Performed by: HOSPITALIST

## 2023-12-30 PROCEDURE — 74018 RADEX ABDOMEN 1 VIEW: CPT

## 2023-12-30 RX ORDER — BENZONATATE 100 MG/1
100 CAPSULE ORAL 3 TIMES DAILY PRN
Status: DISCONTINUED | OUTPATIENT
Start: 2023-12-30 | End: 2024-01-01

## 2023-12-30 RX ORDER — MAGNESIUM OXIDE 400 MG/1
400 TABLET ORAL ONCE
Status: COMPLETED | OUTPATIENT
Start: 2023-12-30 | End: 2023-12-30

## 2023-12-30 RX ORDER — CODEINE PHOSPHATE AND GUAIFENESIN 10; 100 MG/5ML; MG/5ML
5 SOLUTION ORAL EVERY 4 HOURS PRN
Status: DISCONTINUED | OUTPATIENT
Start: 2023-12-30 | End: 2024-01-01

## 2023-12-30 RX ORDER — DEXTROSE MONOHYDRATE, SODIUM CHLORIDE, AND POTASSIUM CHLORIDE 50; 1.49; 4.5 G/1000ML; G/1000ML; G/1000ML
INJECTION, SOLUTION INTRAVENOUS CONTINUOUS
Status: DISCONTINUED | OUTPATIENT
Start: 2023-12-30 | End: 2023-12-31

## 2023-12-30 NOTE — PLAN OF CARE
Kept on insulin drip per protocol dt poor appetite and poor oral intake. Medicated with zofran for nausea with relief.   Problem: RESPIRATORY - ADULT  Goal: Achieves optimal ventilation and oxygenation  Description: INTERVENTIONS:  - Assess for changes in respiratory status  - Assess for changes in mentation and behavior  - Position to facilitate oxygenation and minimize respiratory effort  - Oxygen supplementation based on oxygen saturation or ABGs  - Provide Smoking Cessation handout, if applicable  - Encourage broncho-pulmonary hygiene including cough, deep breathe, Incentive Spirometry  - Assess the need for suctioning and perform as needed  - Assess and instruct to report SOB or any respiratory difficulty  - Respiratory Therapy support as indicated  - Manage/alleviate anxiety  - Monitor for signs/symptoms of CO2 retention  Outcome: Progressing     Problem: GASTROINTESTINAL - ADULT  Goal: Minimal or absence of nausea and vomiting  Description: INTERVENTIONS:  - Maintain adequate hydration with IV or PO as ordered and tolerated  - Nasogastric tube to low intermittent suction as ordered  - Evaluate effectiveness of ordered antiemetic medications  - Provide nonpharmacologic comfort measures as appropriate  - Advance diet as tolerated, if ordered  - Obtain nutritional consult as needed  - Evaluate fluid balance  Outcome: Not Progressing     Problem: METABOLIC/FLUID AND ELECTROLYTES - ADULT  Goal: Electrolytes maintained within normal limits  Description: INTERVENTIONS:  - Monitor labs and rhythm and assess patient for signs and symptoms of electrolyte imbalances  - Administer electrolyte replacement as ordered  - Monitor response to electrolyte replacements, including rhythm and repeat lab results as appropriate  - Fluid restriction as ordered  - Instruct patient on fluid and nutrition restrictions as appropriate  Outcome: Not Progressing  Goal: Hemodynamic stability and optimal renal function  maintained  Description: INTERVENTIONS:  - Monitor labs and assess for signs and symptoms of volume excess or deficit  - Monitor intake, output and patient weight  - Monitor urine specific gravity, serum osmolarity and serum sodium as indicated or ordered  - Monitor response to interventions for patient's volume status, including labs, urine output, blood pressure (other measures as available)  - Encourage oral intake as appropriate  - Instruct patient on fluid and nutrition restrictions as appropriate  Outcome: Progressing

## 2023-12-30 NOTE — PROGRESS NOTES
Critical Care Progress Note     Assessment / Plan:  DKA - due to patient running out of insulin  - anion gap closed and bicarb improved, but nausea is precluding oral intake  - continue insulin gtt  - BMP and electrolytes q4 hours  - can transition this afternoon if nausea improved  COVID - presented with DKA but no other symptoms of COVID  - will discontinue paxlovid as may be contributing to nausea   FEN  - IVF  - NPO on insulin gtt  - replete lytes per protocol  Ppx  - lovenox  - SCDs  Dispo  - full code  - ICU until transitioned off insulin gtt    Barney Frank MD  Pulmonary & Critical Care Medicine  [facility]      Subjective:  No acute events overnight  Received lantus yesterday evening but then had nausea and emesis and was unable to take PO  Started back on insulin gtt  Anion gap remains closed this morning    Objective:  Vitals:    12/30/23 0500 12/30/23 0630 12/30/23 0800 12/30/23 1000   BP:  127/85 (!) 150/96 126/72   BP Location:   Right arm    Pulse: 93 94 92 87   Resp: 23 14 19 15   Temp:   98.9 °F (37.2 °C)    TempSrc:   Temporal    SpO2: 98% 100% 99% 97%   Weight:       Height:         Physical Exam:  General: laying in bed  Respiratory: clear bilaterally, normal effort  Cardiovascular: regular rate and rhythm, no m/r/g  Abdomen: soft, NTND  Extremities: no LE edema  Mental status: interactive, answering questions appropriately    Medications:  Reviewed in EMR    Lab Data:  Reviewed in EMR    Imaging:  I independently visualized all relevant chest imaging in PACS and agree with radiology interpretation except where noted.

## 2023-12-30 NOTE — PLAN OF CARE
Assumed care of patient this morning at shift change.  Pt alert/oriented x4, vital signs stable, denies pain, breathing comfortably on room air.  Blood sugars q1hour, insulin gtt per DKA protocol as documented.  Blood sugars stable, alkalosis improving, BMP continued q4h as ordered.  Electrolytes replaced per protocol.  Poor appetite, pt refusing clear liquids, states doesn't have an appetite.  Orders for subcutaneous insulin to start tonight, insulin gtt to be transitioned off as ordered.  Pt updating family on his own cell phone, see further assessments as documented, will continue to monitor.

## 2023-12-30 NOTE — PROGRESS NOTES
[facility]     Hospitalist Progress Note     Yusuf Alberto Patient Status:  Inpatient    2000 MRN [id number]   [facility] 4SW-A Attending Chidi Perry,    Hosp Day # 2 PCP Chiki Hernandez MD     Chief Complaint: Elevated heart rate    Subjective:     Patient seen and examined. Feeling better overall. Nausea with emesis earlier today.     Objective:    Review of Systems:   A comprehensive review of systems was completed; pertinent positive and negatives stated in subjective.    Vital signs:  Temp:  [98 °F (36.7 °C)-99.5 °F (37.5 °C)] 98.7 °F (37.1 °C)  Pulse:  [79-98] 92  Resp:  [13-24] 15  BP: (104-150)/(66-96) 125/86  SpO2:  [96 %-100 %] 97 %    Physical Exam:    General: No acute distress  Respiratory: no wheezes, no rhonchi  Cardiovascular: S1, S2, regular rate and rhythm  Abdomen: Soft, Non-tender, non-distended, positive bowel sounds  Neuro: No new focal deficits.   Extremities: no edema    Diagnostic Data:    Labs:  Recent Labs   Lab 23  0508 23  0451 23  0421   WBC 11.8* 5.5 3.7*   HGB 17.4 14.7 13.7   MCV 89.2 84.0 83.3   .0 179.0 167.0       Recent Labs   Lab 23  0509 23  0909 23  0420 23  0939 23  1215   *   < > 139* 156* 142*   BUN 13   < > 4* 3* 3*   CREATSERUM 1.45*   < > 0.67* 0.64* 0.60*  0.60*   CA 8.6   < > 7.5* 7.4* 7.7*   ALB 4.3  --   --   --   --    *   < > 141 139 143   K 4.8   < > 3.2* 2.8* 2.9*      < > 115* 113* 115*   CO2 5.0*   < > 20.0* 21.0 21.0   ALKPHO 90  --   --   --   --    AST 13*  --   --   --   --    ALT 19  --   --   --   --    BILT 0.4  --   --   --   --    TP 8.4*  --   --   --   --     < > = values in this interval not displayed.       Estimated Creatinine Clearance: 146.9 mL/min (A) (based on SCr of 0.6 mg/dL (L)).    No results for input(s): \"TROP\", \"TROPHS\", \"CK\" in the last 168 hours.    No results for input(s): \"PTP\", \"INR\" in the last 168 hours.                Microbiology    No results found for this visit on 12/28/23.      Imaging: Reviewed in Epic.    Medications:    potassium phosphate dibasic 15 mmol in sodium chloride 0.9% 250 mL IVPB  15 mmol Intravenous Once    Followed by    potassium chloride  40 mEq Intravenous Once    enoxaparin  40 mg Subcutaneous Nightly    insulin detemir  5 Units Subcutaneous BID    insulin aspart  1-68 Units Subcutaneous TID CC    insulin aspart  1-50 Units Subcutaneous TID AC and HS       Assessment & Plan:      #DKA, uncontrolled DMI  -Patient ran out of insulin PTA  -Anion gap closed, acidosis improved   -Remains on Insulin gtt due to nausea limiting PO intake   -IVF  -Follow labs   -Replace lytes PRN  -Diabetes APRN following     #Hyponatremia, anion gap metabolic acidosis due to #1  -Resolved      #COVID19 positive  -Paxlovid per beatriz Perry,     Supplementary Documentation:     Quality:  DVT Mechanical Prophylaxis:     Early ambuation  DVT Pharmacologic Prophylaxis   Medication    enoxaparin (Lovenox) 40 MG/0.4ML SUBQ injection 40 mg                Code Status: Full Code  Diamond: No urinary catheter in place  Diamond Duration (in days):   Central line:    TREVOR:     Discharge is dependent on: clinical progress  At this point Mr. Alberto is expected to be discharge to: home    The 21st Century Cures Act makes medical notes like these available to patients in the interest of transparency. Please be advised this is a medical document. Medical documents are intended to carry relevant information, facts as evident, and the clinical opinion of the practitioner. The medical note is intended as peer to peer communication and may appear blunt or direct. It is written in medical language and may contain abbreviations or verbiage that are unfamiliar.

## 2023-12-31 LAB
ANION GAP SERPL CALC-SCNC: 3 MMOL/L (ref 0–18)
ANION GAP SERPL CALC-SCNC: 3 MMOL/L (ref 0–18)
ANION GAP SERPL CALC-SCNC: 4 MMOL/L (ref 0–18)
ANION GAP SERPL CALC-SCNC: 5 MMOL/L (ref 0–18)
ANION GAP SERPL CALC-SCNC: 5 MMOL/L (ref 0–18)
ANION GAP SERPL CALC-SCNC: 7 MMOL/L (ref 0–18)
BUN BLD-MCNC: 1 MG/DL (ref 9–23)
BUN BLD-MCNC: 3 MG/DL (ref 9–23)
BUN BLD-MCNC: <1 MG/DL (ref 9–23)
BUN BLD-MCNC: <1 MG/DL (ref 9–23)
CALCIUM BLD-MCNC: 7.4 MG/DL (ref 8.5–10.1)
CALCIUM BLD-MCNC: 8 MG/DL (ref 8.5–10.1)
CALCIUM BLD-MCNC: 8 MG/DL (ref 8.5–10.1)
CALCIUM BLD-MCNC: 8.1 MG/DL (ref 8.5–10.1)
CALCIUM BLD-MCNC: 8.2 MG/DL (ref 8.5–10.1)
CALCIUM BLD-MCNC: 8.3 MG/DL (ref 8.5–10.1)
CHLORIDE SERPL-SCNC: 107 MMOL/L (ref 98–112)
CHLORIDE SERPL-SCNC: 111 MMOL/L (ref 98–112)
CHLORIDE SERPL-SCNC: 111 MMOL/L (ref 98–112)
CHLORIDE SERPL-SCNC: 114 MMOL/L (ref 98–112)
CHLORIDE SERPL-SCNC: 115 MMOL/L (ref 98–112)
CHLORIDE SERPL-SCNC: 115 MMOL/L (ref 98–112)
CO2 SERPL-SCNC: 23 MMOL/L (ref 21–32)
CO2 SERPL-SCNC: 24 MMOL/L (ref 21–32)
CO2 SERPL-SCNC: 25 MMOL/L (ref 21–32)
CO2 SERPL-SCNC: 26 MMOL/L (ref 21–32)
CREAT BLD-MCNC: 0.54 MG/DL
CREAT BLD-MCNC: 0.56 MG/DL
CREAT BLD-MCNC: 0.57 MG/DL
CREAT BLD-MCNC: 0.61 MG/DL
CREAT BLD-MCNC: 0.65 MG/DL
CREAT BLD-MCNC: 0.68 MG/DL
EGFRCR SERPLBLD CKD-EPI 2021: 132 ML/MIN/1.73M2 (ref 60–?)
EGFRCR SERPLBLD CKD-EPI 2021: 134 ML/MIN/1.73M2 (ref 60–?)
EGFRCR SERPLBLD CKD-EPI 2021: 137 ML/MIN/1.73M2 (ref 60–?)
EGFRCR SERPLBLD CKD-EPI 2021: 140 ML/MIN/1.73M2 (ref 60–?)
EGFRCR SERPLBLD CKD-EPI 2021: 140 ML/MIN/1.73M2 (ref 60–?)
EGFRCR SERPLBLD CKD-EPI 2021: 142 ML/MIN/1.73M2 (ref 60–?)
ERYTHROCYTE [DISTWIDTH] IN BLOOD BY AUTOMATED COUNT: 13.3 %
GLUCOSE BLD-MCNC: 100 MG/DL (ref 70–99)
GLUCOSE BLD-MCNC: 108 MG/DL (ref 70–99)
GLUCOSE BLD-MCNC: 109 MG/DL (ref 70–99)
GLUCOSE BLD-MCNC: 110 MG/DL (ref 70–99)
GLUCOSE BLD-MCNC: 113 MG/DL (ref 70–99)
GLUCOSE BLD-MCNC: 114 MG/DL (ref 70–99)
GLUCOSE BLD-MCNC: 124 MG/DL (ref 70–99)
GLUCOSE BLD-MCNC: 126 MG/DL (ref 70–99)
GLUCOSE BLD-MCNC: 139 MG/DL (ref 70–99)
GLUCOSE BLD-MCNC: 154 MG/DL (ref 70–99)
GLUCOSE BLD-MCNC: 155 MG/DL (ref 70–99)
GLUCOSE BLD-MCNC: 160 MG/DL (ref 70–99)
GLUCOSE BLD-MCNC: 206 MG/DL (ref 70–99)
GLUCOSE BLD-MCNC: 210 MG/DL (ref 70–99)
GLUCOSE BLD-MCNC: 246 MG/DL (ref 70–99)
GLUCOSE BLD-MCNC: 92 MG/DL (ref 70–99)
HCT VFR BLD AUTO: 36.9 %
HGB BLD-MCNC: 13.3 G/DL
MAGNESIUM SERPL-MCNC: 1.7 MG/DL (ref 1.6–2.6)
MAGNESIUM SERPL-MCNC: 1.9 MG/DL (ref 1.6–2.6)
MAGNESIUM SERPL-MCNC: 1.9 MG/DL (ref 1.6–2.6)
MAGNESIUM SERPL-MCNC: 2 MG/DL (ref 1.6–2.6)
MCH RBC QN AUTO: 29.4 PG (ref 26–34)
MCHC RBC AUTO-ENTMCNC: 36 G/DL (ref 31–37)
MCV RBC AUTO: 81.5 FL
OSMOLALITY SERPL CALC.SUM OF ELEC: 287 MOSM/KG (ref 275–295)
OSMOLALITY SERPL CALC.SUM OF ELEC: 289 MOSM/KG (ref 275–295)
OSMOLALITY SERPL CALC.SUM OF ELEC: 291 MOSM/KG (ref 275–295)
OSMOLALITY SERPL CALC.SUM OF ELEC: 295 MOSM/KG (ref 275–295)
PHOSPHATE SERPL-MCNC: 1.4 MG/DL (ref 2.5–4.9)
PHOSPHATE SERPL-MCNC: 1.6 MG/DL (ref 2.5–4.9)
PHOSPHATE SERPL-MCNC: 2 MG/DL (ref 2.5–4.9)
PHOSPHATE SERPL-MCNC: 2.5 MG/DL (ref 2.5–4.9)
PHOSPHATE SERPL-MCNC: 2.5 MG/DL (ref 2.5–4.9)
PHOSPHATE SERPL-MCNC: 2.7 MG/DL (ref 2.5–4.9)
PLATELET # BLD AUTO: 160 10(3)UL (ref 150–450)
POTASSIUM SERPL-SCNC: 3.2 MMOL/L (ref 3.5–5.1)
POTASSIUM SERPL-SCNC: 3.4 MMOL/L (ref 3.5–5.1)
POTASSIUM SERPL-SCNC: 3.4 MMOL/L (ref 3.5–5.1)
POTASSIUM SERPL-SCNC: 3.5 MMOL/L (ref 3.5–5.1)
POTASSIUM SERPL-SCNC: 3.6 MMOL/L (ref 3.5–5.1)
POTASSIUM SERPL-SCNC: 3.7 MMOL/L (ref 3.5–5.1)
RBC # BLD AUTO: 4.53 X10(6)UL
SODIUM SERPL-SCNC: 139 MMOL/L (ref 136–145)
SODIUM SERPL-SCNC: 140 MMOL/L (ref 136–145)
SODIUM SERPL-SCNC: 140 MMOL/L (ref 136–145)
SODIUM SERPL-SCNC: 142 MMOL/L (ref 136–145)
SODIUM SERPL-SCNC: 143 MMOL/L (ref 136–145)
SODIUM SERPL-SCNC: 144 MMOL/L (ref 136–145)
WBC # BLD AUTO: 2.8 X10(3) UL (ref 4–11)

## 2023-12-31 PROCEDURE — 99232 SBSQ HOSP IP/OBS MODERATE 35: CPT | Performed by: HOSPITALIST

## 2023-12-31 PROCEDURE — 99233 SBSQ HOSP IP/OBS HIGH 50: CPT | Performed by: INTERNAL MEDICINE

## 2023-12-31 RX ORDER — INSULIN LISPRO 100 [IU]/ML
INJECTION, SOLUTION INTRAVENOUS; SUBCUTANEOUS
Qty: 15 ML | Refills: 0 | Status: SHIPPED | OUTPATIENT
Start: 2023-12-31 | End: 2024-01-01

## 2023-12-31 RX ORDER — POTASSIUM CHLORIDE 14.9 MG/ML
20 INJECTION INTRAVENOUS ONCE
Qty: 100 ML | Refills: 0 | Status: COMPLETED | OUTPATIENT
Start: 2023-12-31 | End: 2023-12-31

## 2023-12-31 RX ORDER — INSULIN DEGLUDEC 200 U/ML
25 INJECTION, SOLUTION SUBCUTANEOUS DAILY
Qty: 18 ML | Refills: 0 | Status: SHIPPED | OUTPATIENT
Start: 2023-12-31 | End: 2024-01-30

## 2023-12-31 RX ORDER — MAGNESIUM SULFATE HEPTAHYDRATE 40 MG/ML
2 INJECTION, SOLUTION INTRAVENOUS ONCE
Status: DISCONTINUED | OUTPATIENT
Start: 2023-12-31 | End: 2023-12-31

## 2023-12-31 RX ORDER — MAGNESIUM OXIDE 400 MG/1
400 TABLET ORAL ONCE
Status: COMPLETED | OUTPATIENT
Start: 2023-12-31 | End: 2023-12-31

## 2023-12-31 RX ORDER — POTASSIUM CHLORIDE 20 MEQ/1
40 TABLET, EXTENDED RELEASE ORAL EVERY 4 HOURS
Status: COMPLETED | OUTPATIENT
Start: 2023-12-31 | End: 2024-01-01

## 2023-12-31 NOTE — PROGRESS NOTES
[facility]     Hospitalist Progress Note     Yusuf Alberto Patient Status:  Inpatient    2000 MRN [id number]   Location [facility] 4SW-A Attending Chidi Perry DO   Hosp Day # 3 PCP Chiki Hernandez MD     Chief Complaint: Elevated heart rate    Subjective:     Patient seen and examined. Denies N/V/abdominal pain.     Objective:    Review of Systems:   A comprehensive review of systems was completed; pertinent positive and negatives stated in subjective.    Vital signs:  Temp:  [98.2 °F (36.8 °C)-99 °F (37.2 °C)] 98.8 °F (37.1 °C)  Pulse:  [] 98  Resp:  [13-22] 22  BP: ()/(62-93) 112/83  SpO2:  [96 %-100 %] 99 %    Physical Exam:    General: No acute distress  Respiratory: no wheezes, no rhonchi  Cardiovascular: S1, S2, regular rate and rhythm  Abdomen: Soft, Non-tender, non-distended, positive bowel sounds  Neuro: No new focal deficits.   Extremities: no edema    Diagnostic Data:    Labs:  Recent Labs   Lab 23  0508 23  0451 23  0421 23  0407   WBC 11.8* 5.5 3.7* 2.8*   HGB 17.4 14.7 13.7 13.3   MCV 89.2 84.0 83.3 81.5   .0 179.0 167.0 160.0       Recent Labs   Lab 23  0509 23  0909 23  0417 23  0810 23  1215   *   < > 154* 100* 155*   BUN 13   < > <1* <1* 1*   CREATSERUM 1.45*   < > 0.57* 0.56* 0.68*   CA 8.6   < > 7.4* 8.0* 8.0*   ALB 4.3  --   --   --   --    *   < > 142 143 140   K 4.8   < > 3.6 3.4* 3.2*      < > 114* 115* 111   CO2 5.0*   < > 25.0 23.0 24.0   ALKPHO 90  --   --   --   --    AST 13*  --   --   --   --    ALT 19  --   --   --   --    BILT 0.4  --   --   --   --    TP 8.4*  --   --   --   --     < > = values in this interval not displayed.       Estimated Creatinine Clearance: 131.5 mL/min (A) (based on SCr of 0.68 mg/dL (L)).    No results for input(s): \"TROP\", \"TROPHS\", \"CK\" in the last 168 hours.    No results for input(s): \"PTP\", \"INR\" in the last 168 hours.                Microbiology    No results found for this visit on 12/28/23.      Imaging: Reviewed in Epic.    Medications:    enoxaparin  40 mg Subcutaneous Nightly    insulin detemir  5 Units Subcutaneous BID    insulin aspart  1-68 Units Subcutaneous TID CC    insulin aspart  1-50 Units Subcutaneous TID AC and HS       Assessment & Plan:      #DKA, uncontrolled DMI  -Patient ran out of insulin PTA - typically on Tresiba 25 units daily   -Anion gap closed, acidosis improved   -Insulin gtt stopper earlier   -IVF  -Follow labs   -Replace lytes PRN  -Diabetes APRN following     #Hyponatremia, anion gap metabolic acidosis due to #1  -Resolved      #COVID19 positive  -Paxlovid per pulm     #Disposition  -Dc planning if blood glucose controlled off insulin gtt    Chidi Perry DO    Supplementary Documentation:     Quality:  DVT Mechanical Prophylaxis:     Early ambuation  DVT Pharmacologic Prophylaxis   Medication    enoxaparin (Lovenox) 40 MG/0.4ML SUBQ injection 40 mg                Code Status: Full Code  Diamond: No urinary catheter in place  Diamond Duration (in days):   Central line:    TREVOR:     Discharge is dependent on: clinical progress  At this point Mr. Alberto is expected to be discharge to: home    The 21st Century Cures Act makes medical notes like these available to patients in the interest of transparency. Please be advised this is a medical document. Medical documents are intended to carry relevant information, facts as evident, and the clinical opinion of the practitioner. The medical note is intended as peer to peer communication and may appear blunt or direct. It is written in medical language and may contain abbreviations or verbiage that are unfamiliar.

## 2023-12-31 NOTE — PROGRESS NOTES
Critical Care Progress Note     Assessment / Plan:  DKA - due to patient running out of insulin  - anion gap closed and bicarb improved  - nausea improved, ready for PO  - transition to subQ insulin  COVID - presented with DKA but no other symptoms of COVID  - paxlovid discontinued as may be contributing to nausea   FEN  - DM diet  - replete lytes per protocol  Ppx  - lovenox  - SCDs  Dispo  - full code  - ICU until transitioned off insulin gtt    Barney Frank MD  Pulmonary & Critical Care Medicine  [facility]      Subjective:  No acute events overnight  Nausea better today  Ready to eat  No other complaints    Objective:  Vitals:    12/31/23 0300 12/31/23 0400 12/31/23 0500 12/31/23 0600   BP:  (!) 124/93  111/71   BP Location:       Pulse: 85 93 82 91   Resp: 15 19 15 16   Temp:       TempSrc:       SpO2: 97% 99% 97% 96%   Weight:  123 lb 7.3 oz (56 kg)     Height:         Physical Exam:  General: laying in bed  Respiratory: clear bilaterally, normal effort  Cardiovascular: regular rate and rhythm, no m/r/g  Abdomen: soft, NTND  Extremities: no LE edema  Mental status: interactive, answering questions appropriately    Medications:  Reviewed in EMR    Lab Data:  Reviewed in EMR    Imaging:  I independently visualized all relevant chest imaging in PACS and agree with radiology interpretation except where noted.

## 2023-12-31 NOTE — PLAN OF CARE
Improved oral intake. Has deneid nausea. Continues on insulin drip per protocol overnight.  Problem: RESPIRATORY - ADULT  Goal: Achieves optimal ventilation and oxygenation  Description: INTERVENTIONS:  - Assess for changes in respiratory status  - Assess for changes in mentation and behavior  - Position to facilitate oxygenation and minimize respiratory effort  - Oxygen supplementation based on oxygen saturation or ABGs  - Provide Smoking Cessation handout, if applicable  - Encourage broncho-pulmonary hygiene including cough, deep breathe, Incentive Spirometry  - Assess the need for suctioning and perform as needed  - Assess and instruct to report SOB or any respiratory difficulty  - Respiratory Therapy support as indicated  - Manage/alleviate anxiety  - Monitor for signs/symptoms of CO2 retention  Outcome: Progressing     Problem: GASTROINTESTINAL - ADULT  Goal: Minimal or absence of nausea and vomiting  Description: INTERVENTIONS:  - Maintain adequate hydration with IV or PO as ordered and tolerated  - Nasogastric tube to low intermittent suction as ordered  - Evaluate effectiveness of ordered antiemetic medications  - Provide nonpharmacologic comfort measures as appropriate  - Advance diet as tolerated, if ordered  - Obtain nutritional consult as needed  - Evaluate fluid balance  Outcome: Progressing     Problem: METABOLIC/FLUID AND ELECTROLYTES - ADULT  Goal: Electrolytes maintained within normal limits  Description: INTERVENTIONS:  - Monitor labs and rhythm and assess patient for signs and symptoms of electrolyte imbalances  - Administer electrolyte replacement as ordered  - Monitor response to electrolyte replacements, including rhythm and repeat lab results as appropriate  - Fluid restriction as ordered  - Instruct patient on fluid and nutrition restrictions as appropriate  Outcome: Progressing  Goal: Hemodynamic stability and optimal renal function maintained  Description: INTERVENTIONS:  - Monitor labs and  assess for signs and symptoms of volume excess or deficit  - Monitor intake, output and patient weight  - Monitor urine specific gravity, serum osmolarity and serum sodium as indicated or ordered  - Monitor response to interventions for patient's volume status, including labs, urine output, blood pressure (other measures as available)  - Encourage oral intake as appropriate  - Instruct patient on fluid and nutrition restrictions as appropriate  Outcome: Progressing

## 2023-12-31 NOTE — PLAN OF CARE
Assumed care this morning at shift change.  Insulin gtt off around 1300 this afternoon.  Tolerating PO intake, improving appetite, denies N/V/D.  Vitals stable.  Anion gap close, CO2 normalized.  Electrolytes replaced per protocol.  Plan for dc planning, pt needs insulin pens for discharge home in am possibly.  Plan to watch off insulin gtt overnight.  ?meds to beds for discharge planning tomorrow. Patient agreeable to plan of care.  See further assessments as documented, will continue to monitor.

## 2024-01-01 VITALS
TEMPERATURE: 98 F | RESPIRATION RATE: 20 BRPM | HEART RATE: 107 BPM | HEIGHT: 64 IN | SYSTOLIC BLOOD PRESSURE: 114 MMHG | OXYGEN SATURATION: 96 % | BODY MASS INDEX: 21.07 KG/M2 | WEIGHT: 123.44 LBS | DIASTOLIC BLOOD PRESSURE: 67 MMHG

## 2024-01-01 LAB
ANION GAP SERPL CALC-SCNC: 6 MMOL/L (ref 0–18)
BASOPHILS # BLD AUTO: 0.02 X10(3) UL (ref 0–0.2)
BASOPHILS NFR BLD AUTO: 0.6 %
BUN BLD-MCNC: 5 MG/DL (ref 9–23)
CALCIUM BLD-MCNC: 8.6 MG/DL (ref 8.5–10.1)
CHLORIDE SERPL-SCNC: 105 MMOL/L (ref 98–112)
CO2 SERPL-SCNC: 28 MMOL/L (ref 21–32)
CREAT BLD-MCNC: 0.59 MG/DL
EGFRCR SERPLBLD CKD-EPI 2021: 138 ML/MIN/1.73M2 (ref 60–?)
EOSINOPHIL # BLD AUTO: 0.06 X10(3) UL (ref 0–0.7)
EOSINOPHIL NFR BLD AUTO: 1.8 %
ERYTHROCYTE [DISTWIDTH] IN BLOOD BY AUTOMATED COUNT: 12.9 %
GLUCOSE BLD-MCNC: 150 MG/DL (ref 70–99)
GLUCOSE BLD-MCNC: 193 MG/DL (ref 70–99)
HCT VFR BLD AUTO: 39.7 %
HGB BLD-MCNC: 14.6 G/DL
IMM GRANULOCYTES # BLD AUTO: 0.01 X10(3) UL (ref 0–1)
IMM GRANULOCYTES NFR BLD: 0.3 %
LYMPHOCYTES # BLD AUTO: 1.15 X10(3) UL (ref 1–4)
LYMPHOCYTES NFR BLD AUTO: 35.2 %
MAGNESIUM SERPL-MCNC: 2 MG/DL (ref 1.6–2.6)
MCH RBC QN AUTO: 29.6 PG (ref 26–34)
MCHC RBC AUTO-ENTMCNC: 36.8 G/DL (ref 31–37)
MCV RBC AUTO: 80.5 FL
MONOCYTES # BLD AUTO: 0.39 X10(3) UL (ref 0.1–1)
MONOCYTES NFR BLD AUTO: 11.9 %
NEUTROPHILS # BLD AUTO: 1.64 X10 (3) UL (ref 1.5–7.7)
NEUTROPHILS # BLD AUTO: 1.64 X10(3) UL (ref 1.5–7.7)
NEUTROPHILS NFR BLD AUTO: 50.2 %
OSMOLALITY SERPL CALC.SUM OF ELEC: 288 MOSM/KG (ref 275–295)
PHOSPHATE SERPL-MCNC: 2.7 MG/DL (ref 2.5–4.9)
PLATELET # BLD AUTO: 162 10(3)UL (ref 150–450)
POTASSIUM SERPL-SCNC: 4 MMOL/L (ref 3.5–5.1)
POTASSIUM SERPL-SCNC: 4 MMOL/L (ref 3.5–5.1)
RBC # BLD AUTO: 4.93 X10(6)UL
SODIUM SERPL-SCNC: 139 MMOL/L (ref 136–145)
WBC # BLD AUTO: 3.3 X10(3) UL (ref 4–11)

## 2024-01-01 PROCEDURE — 99239 HOSP IP/OBS DSCHRG MGMT >30: CPT | Performed by: INTERNAL MEDICINE

## 2024-01-01 RX ORDER — INSULIN LISPRO 100 [IU]/ML
INJECTION, SOLUTION INTRAVENOUS; SUBCUTANEOUS
Qty: 15 ML | Refills: 0 | Status: SHIPPED | OUTPATIENT
Start: 2024-01-01 | End: 2024-01-01

## 2024-01-01 RX ORDER — INSULIN LISPRO 100 [IU]/ML
INJECTION, SOLUTION INTRAVENOUS; SUBCUTANEOUS
Qty: 15 ML | Refills: 0 | Status: SHIPPED | OUTPATIENT
Start: 2024-01-01

## 2024-01-01 RX ORDER — GLUCAGON INJECTION, SOLUTION 1 MG/.2ML
1 INJECTION, SOLUTION SUBCUTANEOUS AS NEEDED
Qty: 0.4 ML | Refills: 1 | Status: SHIPPED | OUTPATIENT
Start: 2024-01-01 | End: 2024-01-08

## 2024-01-01 NOTE — PROGRESS NOTES
NURSING DISCHARGE NOTE    Discharged Home via Ambulatory.  Accompanied by RN  Belongings Taken by patient/family.    Patient discharged home. All discharge instructions, medications and follow up care discussed with patient. Understanding verbalized. PIV removed,

## 2024-01-01 NOTE — PLAN OF CARE
Tolerating diet. Blood sugars AC/HS with sliding scale coverage. VS and labs wnl overnight.  Problem: Diabetes/Glucose Control  Goal: Glucose maintained within prescribed range  Description: INTERVENTIONS:  - Monitor Blood Glucose as ordered  - Assess for signs and symptoms of hyperglycemia and hypoglycemia  - Administer ordered medications to maintain glucose within target range  - Assess barriers to adequate nutritional intake and initiate nutrition consult as needed  - Instruct patient on self management of diabetes  Outcome: Progressing     Problem: RESPIRATORY - ADULT  Goal: Achieves optimal ventilation and oxygenation  Description: INTERVENTIONS:  - Assess for changes in respiratory status  - Assess for changes in mentation and behavior  - Position to facilitate oxygenation and minimize respiratory effort  - Oxygen supplementation based on oxygen saturation or ABGs  - Provide Smoking Cessation handout, if applicable  - Encourage broncho-pulmonary hygiene including cough, deep breathe, Incentive Spirometry  - Assess the need for suctioning and perform as needed  - Assess and instruct to report SOB or any respiratory difficulty  - Respiratory Therapy support as indicated  - Manage/alleviate anxiety  - Monitor for signs/symptoms of CO2 retention  Outcome: Progressing     Problem: GASTROINTESTINAL - ADULT  Goal: Minimal or absence of nausea and vomiting  Description: INTERVENTIONS:  - Maintain adequate hydration with IV or PO as ordered and tolerated  - Nasogastric tube to low intermittent suction as ordered  - Evaluate effectiveness of ordered antiemetic medications  - Provide nonpharmacologic comfort measures as appropriate  - Advance diet as tolerated, if ordered  - Obtain nutritional consult as needed  - Evaluate fluid balance  Outcome: Progressing     Problem: METABOLIC/FLUID AND ELECTROLYTES - ADULT  Goal: Electrolytes maintained within normal limits  Description: INTERVENTIONS:  - Monitor labs and rhythm  and assess patient for signs and symptoms of electrolyte imbalances  - Administer electrolyte replacement as ordered  - Monitor response to electrolyte replacements, including rhythm and repeat lab results as appropriate  - Fluid restriction as ordered  - Instruct patient on fluid and nutrition restrictions as appropriate  Outcome: Progressing  Goal: Hemodynamic stability and optimal renal function maintained  Description: INTERVENTIONS:  - Monitor labs and assess for signs and symptoms of volume excess or deficit  - Monitor intake, output and patient weight  - Monitor urine specific gravity, serum osmolarity and serum sodium as indicated or ordered  - Monitor response to interventions for patient's volume status, including labs, urine output, blood pressure (other measures as available)  - Encourage oral intake as appropriate  - Instruct patient on fluid and nutrition restrictions as appropriate  Outcome: Progressing

## 2024-01-01 NOTE — DISCHARGE SUMMARY
[facility]     Yusuf Alberto Patient Status:  Inpatient    2000 MRN [id number]   Location [facility] 4SW-A Attending No att. providers found   Hosp Day # 4 PCP Chiki Hernandez MD     Date of Admission: 2023  Date of Discharge: 2024  Discharge Disposition: Home or Self Care    Discharge Diagnosis:   DKA, resolved  Uncontrolled diabetes mellitus type 1 with A1c 14.8  COVID-19 infection    History of Present Illness: Yusuf Alberto is a 23 year old adult with a PMH of DMI presented to ED due to elevated heart rate. Patient noticed palpitations, check heart rate with pulse-ox, and noticed heart rate to be in 120's. Patient denies chest pain or palpitations. Reports 4 episodes of emesis yesterday. Denies diarrhea or dysuria. Patient reports running out of insulin about 4 days ago.     Brief Synopsis: Patient ran out of his insulin prior to arrival and was found to be in DKA with any was in the hospital.  He was started on DKA protocol with insulin drip, IV fluids and frequent labs.  He did improve and was transitioned to basal bolus insulin.  He was found to have COVID-19 infection and was initially started on Paxlovid but this was discontinued due to nausea.  On day of discharge, he was feeling well and glucose was controlled on basal bolus insulin.  He will follow-up with endocrinologist and PCP as an outpatient.    Lace+ Score: 37  59-90 High Risk  29-58 Medium Risk  0-28   Low Risk       TCM Follow-Up Recommendation:  LACE 29-58: Moderate Risk of readmission after discharge from the hospital.    Procedures during hospitalization:   None    Incidental or significant findings and recommendations (brief descriptions):  Please see brief synopsis above    Lab/Test results pending at Discharge:   None    Consultants:  Critical care  Diabetes APRN    Discharge Medication List:     Discharge Medications        CHANGE how you take these medications        Instructions  Prescription details   Insulin Lispro (1 Unit Dial) 100 UNIT/ML Sopn  Commonly known as: HumaLOG KwikPen  What changed: additional instructions      USES UP TO 50 UNITS DAILY AS DIRECTEDUSES UP TO 50 UNITS DAILY AS DIRECTED. Check blood glucose 3 times daily with meals and at 9pm. Give 1 unit for every 50 points over 150.   Quantity: 15 mL  Refills: 0     Tresiba FlexTouch 200 UNIT/ML Sopn  Generic drug: Insulin Degludec  What changed:   how much to take  how to take this  when to take this  additional instructions      Inject 25 Units into the skin daily.   Stop taking on: January 30, 2024  Quantity: 18 mL  Refills: 0            CONTINUE taking these medications        Instructions Prescription details   Dexcom G7 Sensor Misc      1 each Every 10 days.   Quantity: 3 each  Refills: 3     Gvoke HypoPen 2-Pack 1 MG/0.2ML SUBQ injection  Generic drug: glucagon      Inject 0.2 mL (1 mg total) into the skin as needed.   Quantity: 0.4 mL  Refills: 1     Pen Needles 32G X 4 MM Misc      5 each daily.   Quantity: 500 each  Refills: 1               Where to Get Your Medications        These medications were sent to [id number] - [place], [place] - [facility] [address] [phone], [phone]  [facility] [address] [facility] [address]      Phone: [phone]   Tresiba FlexTouch 200 UNIT/ML Sopn       Please  your prescriptions at the location directed by your doctor or nurse    Bring a paper prescription for each of these medications  Gvoke HypoPen 2-Pack 1 MG/0.2ML SUBQ injection  Insulin Lispro (1 Unit Dial) 100 UNIT/ML Sopn         [place] reviewed[place] No controlled substances prescribed at discharge.    Follow-up appointment:   Cecilia Fleming APRN  [address] [place] [id number] [phone]    Go to  Appt Jan 8, 2024, at 1030 am    Chiki Hernandez MD  [address] [place] [address] [phone]    Follow up in 1 week(s)      Appointments for Next 30 Days 1/1/2024 - 1/31/2024        Date  Arrival Time Visit Type Length Department Provider     1/8/2024 10:30 AM  APN/MD DIABETIC FOLLOW UP [2892] 30 min [facility], [address] [place] [address] Cecilia Fleming APRN    Patient Instructions:         Location Instructions:     Your appointment is scheduled at [address] [place] [id number].  Masks are optional for all patients and visitors, unless otherwise indicated.                      Vital signs:  Temp:  [98.2 °F (36.8 °C)-100.2 °F (37.9 °C)] 98.4 °F (36.9 °C)  Pulse:  [] 107  Resp:  [12-26] 20  BP: (107-143)/(67-97) 114/67  SpO2:  [94 %-99 %] 96 %    Physical Exam:    General: No acute distress. Awake and alert  Respiratory: Clear to auscultation bilaterally. No wheezes. No rhonchi.  Cardiovascular: S1, S2. Regular rate and rhythm.   Abdomen: Soft, nontender, nondistended.  Positive bowel sounds.   Musculoskeletal: Moves all extremities.  Extremities: No edema.  -----------------------------------------------------------------------------------------------  PATIENT DISCHARGE INSTRUCTIONS: See electronic chart    Dakota Salamanca DO    Time spent:  33 minutes

## 2024-01-02 ENCOUNTER — TELEPHONE (OUTPATIENT)
Dept: INTERNAL MEDICINE CLINIC | Facility: CLINIC | Age: 24
End: 2024-01-02

## 2024-01-02 ENCOUNTER — PATIENT OUTREACH (OUTPATIENT)
Dept: CASE MANAGEMENT | Age: 24
End: 2024-01-02

## 2024-01-02 DIAGNOSIS — E10.10 TYPE 1 DIABETES MELLITUS WITH KETOACIDOSIS WITHOUT COMA (HCC): Primary | ICD-10-CM

## 2024-01-02 DIAGNOSIS — Z02.9 ENCOUNTERS FOR ADMINISTRATIVE PURPOSE: ICD-10-CM

## 2024-01-02 NOTE — TELEPHONE ENCOUNTER
Spoke to patient for TCM today (Covid+ 12/28/23).  Patient requesting an appt but none are available.  TCM appointment recommended by 1/8/24 as patient is a High risk for readmission.  Please assist pt in scheduling appt. Thank you    BOOK BY DATE (last date for TCM): 1/15/24

## 2024-01-02 NOTE — PROGRESS NOTES
Initial Post Discharge Follow Up   Discharge Date: 1/1/24  Contact Date: 1/2/2024    Consent Verification:  Assessment Completed With: Patient  HIPAA Verified?  Yes    Discharge Dx:   Type 1 diabetes mellitus with ketoacidosis without coma     General:   How have you been since your discharge from the hospital?  I've still had a cough and am sneezing.     Do you have any pain since discharge?  No    When you were leaving the hospital were your discharge instructions reviewed with you? Yes  How well were your discharge instructions explained to you?   On a scale of 1-5   1- Very Poor and 5- Very well   Very Well  Do you have any questions about your discharge instructions?  No  Before leaving the hospital was your diagnoses explained to you? Yes  Do you have any questions about your diagnoses? No  Are you able to perform normal daily activities of living as you have prior to your hospital stay (dressing, bathing, ambulating to the bathroom, etc)? yes  (NCM) Was patient given a different diet per AVS? yes  If so, which diet?  Carb controlled  Are there any barriers to following this diet? no    Medications:   Current Outpatient Medications   Medication Sig Dispense Refill    glucagon (GVOKE HYPOPEN 2-PACK) 1 MG/0.2ML Subcutaneous SUBQ injection Inject 0.2 mL (1 mg total) into the skin as needed. 0.4 mL 1    Insulin Lispro, 1 Unit Dial, (HUMALOG KWIKPEN) 100 UNIT/ML Subcutaneous Solution Pen-injector USES UP TO 50 UNITS DAILY AS DIRECTEDUSES UP TO 50 UNITS DAILY AS DIRECTED. Check blood glucose 3 times daily with meals and at 9pm. Give 1 unit for every 50 points over 150. 15 mL 0    Insulin Degludec (TRESIBA FLEXTOUCH) 200 UNIT/ML Subcutaneous Solution Pen-injector Inject 25 Units into the skin daily. 18 mL 0    Continuous Blood Gluc Sensor (DEXCOM G7 SENSOR) Does not apply Misc 1 each Every 10 days. (Patient not taking: Reported on 12/28/2023) 3 each 3    Insulin Pen Needle (PEN NEEDLES) 32G X 4 MM Does not apply Misc  5 each daily. 500 each 1     Were there any changes to your current medication(s) noted on the AVS? Yes  If so, were these medication changes discussed with you prior to leaving the hospital? Yes  If a new medication was prescribed:  not new medications but a new script was sent in for insulins. Pt states that he will  today  Let's go over your medications together to make sure we are not missing anything. Medications Reviewed  Are there any reasons that keep you from taking your medication as prescribed? No  Are you having any concerns with constipation? No      Discharge medications reviewed/discussed/and reconciled against outpatient medications with patient.  Any changes or updates to medications sent to PCP.  Patient Acknowledged     Referrals/orders at D/C:  Referrals/orders placed at D/C? no    DME ordered at D/C? No      Discharge orders, AVS reviewed and discussed with patient. Any changes or updates to orders sent to PCP.  Patient Acknowledged      SDOH: transportation addressed during hospitalization-see SW notes.   Transportation Needs: Unmet Transportation Needs (12/28/2023)    Transportation Needs     Lack of Transportation: Yes     Financial Resource Strain: Low Risk  (1/2/2024)    Financial Resource Strain     Difficulty of Paying Living Expenses: Not hard at all     Med Affordability: No         Follow up appointments:      Your appointments       Date & Time Appointment Department ([place])    Jan 08, 2024 10:30 AM CST Apn/Md Diabetic Follow Up with Cecilia Fleming APRN [address] ([facility] [place])        Feb 19, 2024  9:00 AM CST Follow up - Extended with Chiki Hernandez MD [address] ([facility] [place])              [address]  [facility] [place]  [address] [place] [id number]  [phone] [facility]  [facility], [address] [place] [address] [facility] [place]  [address] [facility] [address]  [facility] [address] [phone]            TCC  Was TCC ordered: No      PCP (If no TCC appointment)  Does patient already have a PCP appointment scheduled? No  NCM Attempted to schedule PCP office TCM appointment with patient   If no appointment scheduled: Explain-no available appts, NCM sent TE to PCP Office and pt aware that he will receive a return call to schedule.     Specialist    Does the patient have any other follow up appointment(s) needing to be scheduled? No, pt already [facility] with [facility]  Is there any reason as to why you cannot make your appointment(s)?  No     Needs post D/C:   Now that you are home, are there any needs or concerns you need addressed before your next visit with your PCP?  (DME, meds, questions, etc.): No    Interventions by NCM:   NCM reviewed discharge instructions and when to seek medical attention with the patient. He states that he is feeling okay. He still has a cough and is now sneezing. He states that his heart rate has been normal since coming home. His blood sugar was 122 this morning. He denied having any fever, n/v/c/d, sob, pain, lightheadedness, HA or any other new or worsening symptoms. Med review completed. He denied having any questions or concerns at this time.       CCM referral placed:    No    BOOK BY DATE: 1/15/24

## 2024-01-04 NOTE — PROGRESS NOTES
Chief Complaint   Patient presents with    Diabetes     Follow up forgot meter not fasting checked BG in office 268.        Yusuf Alberto is a 23 year old presenting today  for type 1 diabetes management.   Primary care physician: Chiki Hernandez MD-    Last A1c value was 14.8% done 12/28/2023.  Last DM appt 5-2023   Lost to follow up -despite multiple calls, letters   Barriers to care: transportation, work schedule , cost barriers   BG today in office: 268 mg/dl   Has been up most of night; did not bolus humalog this am   Self reports testing 4 - x daily. Always before meals  Lowest BG trend 120 range     Recent DKA hospitalization  (+ COVID)   Still has residual cough     pt reports he ran out of insulin \" for 2 days   Cost of tresiba > 125$ for a box (?)   Humalog was priced ok     Having some L eye redness, white vision spots. 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 Vision changes have not worsened nor improved. Pain has improved.  Over due for eye exam - requesting referral today       In past he has Declined GVOKE due to $200 cost at pharmacy -despite offering co pay card   At last visit, dexcom was ordered but pt states it \"was not covered\" at pharmacy. Did not follow up w us to pursue DME coverage       Diabetes History:  Type 1 DM 2015   Patient has had hospitalizations for blood sugar issues: 2020 DKA (hyperglycemia/dental infection) , 1-2024 DKA (after covid/off insulin)   denies any history of pancreatitis      Previous DM therapies:  NOVOLOG :formulary   Lantus :formulary   Pao CGM (cost)     Current DM Regimen:  Tresiba U 200: 25 units once dialy   Humalog kwik pen :   ICR 1:10  ISF 40  BG target 120 mg/dl ( Correct above 180)   3 hour active insulin time        HGBA1C:    Lab Results   Component Value Date    A1C 14.8 (H) 12/28/2023    A1C 16.1 (H) 03/02/2023    A1C >16.4 (H) 07/01/2020     (H) 12/28/2023     Lab Results   Component Value Date     CHOLEST 266 (H) 03/02/2023    TRIG 176 (H) 03/02/2023    HDL 51 03/02/2023     (H) 03/02/2023    MICROALBCREA  03/02/2023      Comment:      Unable to calculate due to Urine Microalbumin <0.5 mg/dL      CREATSERUM 0.59 (L) 01/01/2024    EGFRCR 138 01/01/2024    AST 13 (L) 12/28/2023    ALT 19 12/28/2023     Lab Results   Component Value Date    TSH 3.180 03/02/2023    T4F 1.3 04/02/2015           DM Complications:  Microvascular:   Neuropathy: no  Retinopathy: no  Nephropathy: no    Macrovascular:  PVD: no  CAD: no  Stroke/CVA: no        Modifying factors:  Medication adherence: restarted MDI  insulin   Recent steroids, illness or infections ( past 3m): recent COVID  , DKA      Allergies: Dust and Seasonal    Past Medical History:   Diagnosis Date    Asthma     Diabetes mellitus (HCC)     Extrinsic asthma, unspecified     Seasonal allergies     Type 1 diabetes mellitus (HCC)      History reviewed. No pertinent surgical history.  Social History     Socioeconomic History    Marital status: Single   Tobacco Use    Smoking status: Never     Passive exposure: Never    Smokeless tobacco: Never   Vaping Use    Vaping Use: Never used   Substance and Sexual Activity    Alcohol use: Not Currently     Alcohol/week: 4.0 standard drinks of alcohol     Types: 4 Glasses of wine per week    Drug use: Never     Social Determinants of Health     Financial Resource Strain: Low Risk  (1/2/2024)    Financial Resource Strain     Difficulty of Paying Living Expenses: Not hard at all     Med Affordability: No   Food Insecurity: No Food Insecurity (12/28/2023)    Food Insecurity     Food Insecurity: Never true   Transportation Needs: Unmet Transportation Needs (12/28/2023)    Transportation Needs     Lack of Transportation: Yes   Housing Stability: Low Risk  (12/28/2023)    Housing Stability     Housing Instability: No     Family History   Problem Relation Age of Onset    Hypertension Mother     Hypertension Maternal  Grandmother     Diabetes Maternal Grandmother     Cancer Maternal Grandmother     Lipids Maternal Grandmother     Stroke Maternal Grandmother     Other (atrial flutter) Maternal Grandmother     Hypertension Maternal Grandfather     Lipids Maternal Grandfather     Cancer Maternal Grandfather      Current Medication List:   Current Outpatient Medications   Medication Sig Dispense Refill    Continuous Blood Gluc Sensor (DEXCOM G7 SENSOR) Does not apply Misc 1 each Every 10 days. 9 each 3    glucagon (GVOKE HYPOPEN 2-PACK) 1 MG/0.2ML Subcutaneous SUBQ injection Inject 0.2 mL (1 mg total) into the skin as needed. 0.4 mL 1    Insulin Lispro, 1 Unit Dial, (HUMALOG KWIKPEN) 100 UNIT/ML Subcutaneous Solution Pen-injector USES UP TO 50 UNITS DAILY AS DIRECTEDUSES UP TO 50 UNITS DAILY AS DIRECTED. Check blood glucose 3 times daily with meals and at 9pm. Give 1 unit for every 50 points over 150. 15 mL 0    Insulin Degludec (TRESIBA FLEXTOUCH) 200 UNIT/ML Subcutaneous Solution Pen-injector Inject 25 Units into the skin daily. 18 mL 0    Insulin Pen Needle (PEN NEEDLES) 32G X 4 MM Does not apply Misc 5 each daily. 500 each 1           DM associated review of  symptoms:   Endocrine: Polyuria, polyphagia, polydipsia: no  Neurological: Paresthesias: no  HEENT: Blurred vision: yes - see above L vision changes   Skin: no rash or wounds  Hematological: Hypoglycemia: no      Review of Systems     LUNGS: denies shortness of breath   + dry cough   CARDIOVASCULAR: denies chest pain  GI: denies abdominal pain, nausea or diarrhea   : denies dysuria    Physical exam:  /62   Pulse 78   Resp 16   Wt 137 lb 12.8 oz (62.5 kg)   SpO2 98%   BMI 23.65 kg/m²   Body mass index is 23.65 kg/m².    Physical Exam     Constitutional: Normal appearance   Cardiovascular: Normal rate  Pulmonary/Chest: Effort normal  Neurological: Alert and oriented .   Psychiatric: Normal mood and affect.   Musculoskeletal:  Diabetes foot exam:   Good foot  hygiene.   Bilateral barefoot skin diabetic exam: normal.  Visualized feet and the appearance : normal.  Bilateral monofilament/sensation of both feet is normal. Vibration to dorsum to the first toe perceived.   Bilateral 2+ pedal pulse pedal pulse exam       Assessment/Plan:      Dyslipidemia:   Last  labs done 3.2023- update ordered   Not on statin   Due to age, focus on lifestyle modifications      L eye vision changes  Opth referral to Dr Lu  Deferred retina camera due to active L eye vision changes.         Type 1 diabetes mellitus with hyperglycemia (HCC)  A1C: 14.8 % ( last 16.1% )   Weight: 137 lb ( last weight: 135 lb )   Diabetes is poorly controlled   Reviewed with patient health impact associated with high glucose trends and the importance of better glucose control to prevent onset /progression of DM complications.     Extensive discussion today about the need for insulin at all times w T1DM and risk of DKA   Discussed the need for maintaining DM appts to insure control is improving  Pt verbalized understanding of above.   Desires to investigate insulin pump but will initially seek CGM, Dexcom G7 coverage and advised pt he needs to maintain follow up before pursuing pumps   Pump brochures were provided (prefers Dexcom integration)   Dexcom G7 ordered to [facility]; advised to call us if cost over $75 ; can pursue w DME     No insulin dose changes today   Continue   Tresiba U 200 flex touch: 25 units once daily   Humalog Kwik pen:   Work day meal: 1:20 ICR ratio  , otherwise keep 1:10 ICR   ISF: 30   BG target 120 gm/dl (correct above 180 mg/dl)   3 hour active insulin time       Co pay card provided for GVOKE today   Discussed importance of site rotation         Reviewed clinical significance of A1c, adverse effects of suboptimal glucose control, and goals of therapy   Reviewed the A1C test, what the value reflects and the goal for the patient.   Reminded pt on A1C and blood sugar targets (Fasting < 130  and post prandial <180 ) and complications associated with hyperglycemia and uncontrolled DM (on AVS)   Recommended SMBG 4- x daily if not using CGM   For hypoglycemia emergency, Glucagon rx: GVOKE      declines CDE appt  Reviewed s/s and treatment of hypoglycemia (on AVS)   Continue with lifestyle modifications since they have positive impact on diabetes/blood sugars/health (portion control, physical activity)   Reinforced timing and adherence with medication, self-monitoring of blood glucose and routine follow up    The patient is asked to return in  4-6 weeks video and 3 m in office  but recommended to contact DM clinic sooner if questions or concerns.    The patient indicates understanding of these issues and agrees to the plan.      Orders Placed This Encounter    ASSAY QUANTITATIVE, GLUCOSE     Order Specific Question:   Release to patient     Answer:   Immediate    Microalb/Creat Ratio, Random Urine     Standing Status:   Future     Number of Occurrences:   1     Standing Expiration Date:   2025     Order Specific Question:   Release to patient     Answer:   Immediate    Comp Metabolic Panel (14)     Standing Status:   Future     Standing Expiration Date:   2025    Hemoglobin A1C     Standing Status:   Future     Standing Expiration Date:   2025    TSH W Reflex To Free T4     Standing Status:   Future     Standing Expiration Date:   2025     Order Specific Question:   Release to patient     Answer:   Immediate    Continuous Blood Gluc Sensor (DEXCOM G7 SENSOR) Does not apply Misc     Si each Every 10 days.     Dispense:  9 each     Refill:  3    glucagon (GVOKE HYPOPEN 2-PACK) 1 MG/0.2ML Subcutaneous SUBQ injection     Sig: Inject 0.2 mL (1 mg total) into the skin as needed.     Dispense:  0.4 mL     Refill:  1     Pt has co pay card     DM quality  A1C/Blood pressure: as reported above   Nephropathy screening: 3-2023 no indication for ace /arb rx.   Collected today   LIPID screening:  3  . No  statin rx. , 38 yr old   Last dilated eye exam: No data recorded Exam shows retinopathy? No data recorded  Last diabetic foot exam: Last Foot Exam: 24  Date of last PHQ-2 depression screen: PHQ-2 - Date of last depression screenin2024    Spent 45 min obtaining patient history, reviewing recent DKA hospitalization records, evaluating patient, reviewing blood glucose trends, discussing treatment options, insulin pump options, lifestyle modifications and completing documentation -  The risks and benefits of my recommendations, as well as other treatment options were discussed with the patient today. questions were also answered to the best of my knowledge.

## 2024-01-05 NOTE — PAYOR COMM NOTE
--------------  ADMISSION REVIEW     Payor: [employer] P[employer]  Subscriber #:  [id number]  Authorization Number: [id number]    Admit date: 12/28/23  Admit time: 12:07 PM    Discharged: 1/1/2024 1326         REVIEW DOCUMENTATION:    Patient Seen in: [facility] Emergency Department      History     Chief Complaint   Patient presents with    Arrythmia/Palpitations    Nausea/Vomiting/Diarrhea    Hyperglycemia     Stated Complaint: tachycardia, dehydration, SOB with exertion    Subjective:   HPI    Patient presents to the emergency department with a 3-day history of progressively worsening generalized malaise and weakness.  The patient states that he is a type I diabetic and was unable to refill his Humalog.  However, during this period of time, the patient has been on his long-acting insulin.  He has had no fever or chills.  He has had no specific cough, shortness of breath or pain.  He has been somewhat nauseous, particularly in the past 12 hours.  The patient states that he has had DKA in the past and states that his current symptoms are similar to his DKA.    Social Determinants of Health     Food Insecurity: No Food Insecurity (12/28/2023)    Food Insecurity     Food Insecurity: Never true   Transportation Needs: Unmet Transportation Needs (12/28/2023)    Transportation Needs     Lack of Transportation: Yes   Housing Stability: Low Risk  (12/28/2023)    Housing Stability     Housing Instability: No       Review of Systems    Positive for stated complaint: tachycardia, dehydration, SOB with exertion  Physical Exam     ED Triage Vitals [12/28/23 0446]   /87   Pulse 120   Resp 18   Temp 97.9 °F (36.6 °C)   Temp src Temporal   SpO2 99 %   O2 Device None (Room air)     Current:/69 (BP Location: Right arm)   Pulse 108      Ht 162.6 cm (5' 4\")   Wt 48.4 kg   SpO2 98%   BMI 18.32 kg/m²     Physical Exam  HENT:      Head: Normocephalic.      Mouth/Throat:      Mouth: Mucous membranes are dry.    Cardiovascular:      Rate and Rhythm: Regular rhythm. Tachycardia present.      Heart sounds: Normal heart sounds. No murmur heard.  Pulmonary:      Breath sounds: Normal breath sounds. No wheezing or rales.      Comments: Patient is tachypneic.    ED Course     Labs Reviewed   COMP METABOLIC PANEL (14) - Abnormal; Notable for the following components:       Result Value    Glucose 319 (*)     Sodium 134 (*)     CO2 5.0 (*)     Anion Gap 22 (*)     Creatinine 1.45 (*)     AST 13 (*)     Total Protein 8.4 (*)     All other components within normal limits   URINALYSIS, ROUTINE - Abnormal; Notable for the following components:    Glucose Urine >1000 (*)     Ketones Urine >150 (*)     Blood Urine 2+ (*)     Protein Urine 100 (*)     RBC Urine 3-5 (*)     All other components within normal limits    HgbA1C 14.8 (*)     Estimated Average Glucose 378 (*)     All other components within normal limits    SARS-CoV-2 (COVID-19) - (GeneXpert) Detected (*)    CBC W/ DIFFERENTIAL - Abnormal; Notable for the following components:    WBC 11.8 (*)     RBC 5.91 (*)     Neutrophil Absolute Prelim 9.74 (*)     Neutrophil Absolute 9.74 (*)    EKG    Rate, intervals and axes as noted on EKG Report.  Rate: 120  Rhythm: Sinus Rhythm  Reading: Sinus tachycardia without evidence of acute ischemia.    Rate, intervals and axes as noted on EKG Report.  Rate: 120  Rhythm: Sinus Rhythm  Reading: Sinus tachycardia without any ischemic abnormalities.     Medications   influenza vaccine split quad (Fluzone QIV) 0.5 mL IM injection (ages 6 months to 64 years) 0.5 mL (has no administration in time range)   sodium chloride 0.9% infusion ( Intravenous Not Given 12/28/23 1315)   glucose (Dex4) 15 GM/59ML oral liquid 15 g (has no administration in time range)     Or   glucose (Glutose) 40% oral gel 15 g (has no administration in time range)     Or   glucose-vitamin C (Dex-4) chewable tab 4 tablet (has no administration in time range)     Or   dextrose  50% injection 50 mL (has no administration in time range)     Or   glucose (Dex4) 15 GM/59ML oral liquid 30 g (has no administration in time range)     Or   glucose (Glutose) 40% oral gel 30 g (has no administration in time range)     Or   glucose-vitamin C (Dex-4) chewable tab 8 tablet (has no administration in time range)   insulin regular human (Novolin R, Humulin R) 100 Units in sodium chloride 0.9% 100 mL standard infusion (100 mL) (6 Units/hr Intravenous Rate/Dose Change 12/28/23 1619)   melatonin tab 3 mg (has no administration in time range)   heparin (Porcine) 5000 UNIT/ML injection 5,000 Units (has no administration in time range)   acetaminophen (Tylenol Extra Strength) tab 500 mg (has no administration in time range)   polyethylene glycol (PEG 3350) (Miralax) 17 g oral packet 17 g (has no administration in time range)   sennosides (Senokot) tab 17.2 mg (has no administration in time range)   bisacodyl (Dulcolax) 10 MG rectal suppository 10 mg (has no administration in time range)   fleet enema (Fleet) 7-19 GM/118ML rectal enema 133 mL (has no administration in time range)   ondansetron (Zofran) 4 MG/2ML injection 4 mg (has no administration in time range)   prochlorperazine (Compazine) 10 MG/2ML injection 5 mg (has no administration in time range)   dextrose 5%-sodium chloride 0.45% infusion (125 mL/hr Intravenous New Bag 12/28/23 1300)   nirmatrelvir-ritonavir (Paxlovid) 300-100 MG therapy pack 3 tablet (has no administration in time range)   sodium phosphate 15 mmol in 0.9% NaCl 100mL IVPB premix (has no administration in time range)   sodium chloride 0.9 % IV bolus 1,000 mL (0 mL Intravenous Stopped 12/28/23 0631)   ondansetron (Zofran) 4 MG/2ML injection 4 mg (4 mg Intravenous Given 12/28/23 0511)     A total of 45 minutes of critical care time (exclusive of billable procedures) was administered to manage the patient's critical lab values and metabolic instability due to his DKA and acute COVID  infection..  This involved direct patient intervention, complex decision making, and/or extensive discussions with the patient, family, and clinical staff.        Disposition and Plan     Clinical Impression:  1. Type 1 diabetes mellitus with ketoacidosis without coma (HCC)    2. COVID         Disposition:  Admit  2023 10:41 am      [facility]     Yusuf Alberto Patient Status:  Inpatient    2000 MRN [id number]   Location [facility] 4SW-A Attending Chidi Perry,    Hosp Day # 0 PCP Chiki Hernandez MD     Chief Complaint: Elevated heart rate    Subjective:    History of Present Illness:     Yusuf Alberto is a 23 year old adult with a PMH of DMI presented to ED due to elevated heart rate. Patient noticed palpitations, check heart rate with pulse-ox, and noticed heart rate to be in 120's. Patient denies chest pain or palpitations. Reports 4 episodes of emesis yesterday. Denies diarrhea or dysuria. Patient reports running out of insulin about 4 days ago.     History/Other:    Past Medical History:  Past Medical History:   Diagnosis Date    Asthma     Diabetes mellitus (HCC)     Extrinsic asthma, unspecified     Seasonal allergies     Type 1 diabetes mellitus (HCC)      Past Surgical History:   History reviewed. No pertinent surgical history.     Allergies:   Allergies   Allergen Reactions    Dust ASTHMA    Seasonal        Medications:    No current facility-administered medications on file prior to encounter.     Current Outpatient Medications on File Prior to Encounter   Medication Sig Dispense Refill    Insulin Degludec (TRESIBA FLEXTOUCH) 200 UNIT/ML Subcutaneous Solution Pen-injector Uses up to 40 units daily 18 mL 0    Insulin Lispro, 1 Unit Dial, (HUMALOG KWIKPEN) 100 UNIT/ML Subcutaneous Solution Pen-injector Uses up to 50 units daily as directed 45 mL 0    glucagon (GVOKE HYPOPEN 2-PACK) 1 MG/0.2ML Subcutaneous SUBQ injection Inject 0.2 mL (1 mg total) into the  skin as needed. 0.4 mL 1    Continuous Blood Gluc Sensor (DEXCOM G7 SENSOR) Does not apply Misc 1 each Every 10 days. (Patient not taking: Reported on 12/28/2023) 3 each 3    Insulin Pen Needle (PEN NEEDLES) 32G X 4 MM Does not apply Misc 5 each daily. 500 each 1       Review of Systems:   A comprehensive review of systems was completed.    Pertinent positives and negatives noted in the HPI.    Objective:   Physical Exam:    /77 (BP Location: Right arm)   Pulse 110   Temp 98.6 °F (37 °C) (Temporal)   Resp 19   Ht 162.6 cm (5' 4\")   Wt 106 lb 11.2 oz (48.4 kg)   SpO2 99%   BMI 18.32 kg/m²   Assessment & Plan:      #DKA, uncontrolled DMI  -Patient ran out of insulin PTA  -Insulin gtt  -IVF  -BMP, Mg, Phos Q4H until anion gap closes  -Replace lytes PRN  -Diabetes APRN to see    #Hyponatremia, anion gap metabolic acidosis due to #1  -IVF  -Trend labs    #COVID19 positive  -Not hypoxic/febrile  -No role for treatment at this time     Plan of care discussed with patient, RN, and ED physician. >40 minutes critical care time spent.     Chidi Perry,       12/28/23 Critical Care    Assessment/Plan:     DKA  -insulin drip  -Labs q4hrs  -NPO while on insulin drip  -consult diabetes APRN for resources  COVID  -Paxlovid  -Monitor for worsening symptoms  F/E/N  -NPO  -IVF  -replete electrolytes as needed  Proph  -SCD  -Heparin  Dispo  -full code  -ICU while on insulin drip        Plan of care discussed with intensivist on-call Dr. Contreras.     A total of 35 minutes of critical care time (exclusive of billable procedures) was administered. This involved direct patient intervention, complex decision making, and/or extensive discussions with the patient, family, and clinical staff.     Pulmonary Critical Care Physician ADDENDUM      I have personally seen and examined the patient.  I have reviewed and agree with Tyra ELMORE's documentation with the following highlights/changes.        Patient with history of  asthma and type 1 DM who presented on 12/28 with increased SOB and generalized malaise.  Pt states that he has been out of insulin since prior to Christmas and then came down with URI symptoms, so was not consistently checking his blood sugars at home.  Due to progressive SOB and malaise, he came into the ER where he was noted to be in severe DKA with bicarb of 5 and also found to be covid positive.  He was started on IVF and insulin gtt and admitted to the ICU for further care.  Will continue insulin gtt until anion gap closes.  Start paxlovid for covid.       Jason PETERS        12/29/23 Critical Care    DKA - due to running out of insulin  - continue insulin gtt  - BMP and electrolytes 14 hours  - anion gap closed, but bicarb remains low, will continue on insulin gtt for now and consider transition this afternoon pending repeat labs  COVID - presented with DKA but no other symptoms of COVID  - paxlovid  FEN  - IVF  - NPO on insulin gtt  - replete lytes per protocol  Ppx  - lovenox  - SCDs  Dispo  - full code  - ICU until transitioned off insulin gtt     Barney Frank MD  Pulmonary & Critical Care Medicine       12/30/23 Critical care    Assessment / Plan:  DKA - due to patient running out of insulin  - anion gap closed and bicarb improved, but nausea is precluding oral intake  - continue insulin gtt  - BMP and electrolytes q4 hours  - can transition this afternoon if nausea improved  COVID - presented with DKA but no other symptoms of COVID  - will discontinue paxlovid as may be contributing to nausea   FEN  - IVF  - NPO on insulin gtt  - replete lytes per protocol  Ppx  - lovenox  - SCDs  Dispo  - full code  - ICU until transitioned off insulin gtt     Barney Frank MD  Pulmonary & Critical Care Medicine  [facility]        Subjective:  No acute events overnight  Received lantus yesterday evening but then had nausea and emesis and was unable to take PO  Started back on insulin gtt  Anion gap  remains closed this morning    12/31/23 Nursing      Insulin gtt off around 1300 this afternoon.  Tolerating PO intake, improving appetite, denies N/V/D.  Vitals stable.  Anion gap close, CO2 normalized.  Electrolytes replaced per protocol.  Plan for dc planning, pt needs insulin pens for discharge home in am possibly.       1/1/23 Hospitalist     Date of Admission: 12/28/2023  Date of Discharge: 1/1/2024  Discharge Disposition: Home or Self Care     Discharge Diagnosis:   DKA, resolved  Uncontrolled diabetes mellitus type 1 with A1c 14.8  COVID-19 infection     History of Present Illness: Yusuf Alberto is a 23 year old adult with a PMH of DMI presented to ED due to elevated heart rate. Patient noticed palpitations, check heart rate with pulse-ox, and noticed heart rate to be in 120's. Patient denies chest pain or palpitations. Reports 4 episodes of emesis yesterday. Denies diarrhea or dysuria. Patient reports running out of insulin about 4 days ago.      Brief Synopsis: Patient ran out of his insulin prior to arrival and was found to be in DKA with any was in the hospital.  He was started on DKA protocol with insulin drip, IV fluids and frequent labs.  He did improve and was transitioned to basal bolus insulin.  He was found to have COVID-19 infection and was initially started on Paxlovid but this was discontinued due to nausea.  On day of discharge, he was feeling well and glucose was controlled on basal bolus insulin.  He will follow-up with endocrinologist and PCP as an outpatient.         MEDICATIONS ADMINISTERED IN LAST 1 DAY:  Administration History                                    Laboratory      Latest Reference Range & Units 12/28/23 14:56 12/28/23 20:08 12/29/23 00:33 12/29/23 04:51 12/29/23 08:11 12/29/23 12:49 12/29/23 16:05 12/29/23 20:02 12/30/23 00:24 12/30/23 04:20 12/30/23 09:39   Magnesium, Serum 1.6 - 2.6 mg/dL 2.3 2.2 1.9 1.9 1.9 1.9 1.8 1.6 2.2 2.2 2.0   PHOSPHORUS 2.5 - 4.9 mg/dL 1.7 (L)  1.8 (L) 1.6 (L) 1.0 (LL)  1.1 (L) 0.9 (LL) 2.6 2.3 (L) 1.7 (L) 1.1 (L) 2.0 (L) 1.7 (L)   (LL): Data is critically low  (L): Data is abnormally low     Latest Reference Range & Units 12/28/23 05:08 12/28/23 05:09 12/28/23 09:09 12/28/23 14:53 12/28/23 14:56 12/28/23 20:08 12/29/23 00:33 12/29/23 04:51   Glucose 70 - 99 mg/dL  319 (H) 272 (H)  223 (H) 199 (H) 187 (H) 161 (H)   HEMOGLOBIN A1c <5.7 % 14.8 (H)          ESTIMATED AVERAGE GLUCOSE 68 - 126 mg/dL 378 (H)          Sodium 136 - 145 mmol/L  134 (L) 135 (L)  135 (L) 136 138 139   Potassium 3.5 - 5.1 mmol/L  4.8 4.2  3.8 3.5 3.8 3.3 (L)   Chloride 98 - 112 mmol/L  107 113 (H)  115 (H) 115 (H) 116 (H) 117 (H)   Carbon Dioxide, Total 21.0 - 32.0 mmol/L  5.0 (LL) 5.0 (LL)  8.0 (LL) 12.0 (L) 11.0 (L) 11.0 (L)   BUN 9 - 23 mg/dL  13 14  12 11 11 10   CREATININE 0.70 - 1.30 mg/dL  1.45 (H) 1.36 (H) 1.24 1.26 1.25 1.18 1.05   CALCIUM 8.5 - 10.1 mg/dL  8.6 8.1 (L)  8.0 (L) 8.0 (L) 8.1 (L) 7.7 (L)   (LL): Data is critically low  (H): Data is abnormally high  (L): Data is abnormally low     Latest Reference Range & Units 12/29/23 04:51 12/29/23 08:11 12/29/23 12:49 12/29/23 16:05 12/29/23 20:02 12/30/23 00:24 12/30/23 04:20 12/30/23 09:39 12/30/23 12:15 12/30/23 15:48   Glucose 70 - 99 mg/dL 161 (H) 153 (H) 156 (H) 147 (H) 145 (H) 144 (H) 139 (H) 156 (H) 142 (H) 164 (H)   Sodium 136 - 145 mmol/L 139 139 139 139 141 140 141 139 143 142   Potassium 3.5 - 5.1 mmol/L 3.3 (L) 3.1 (L) 3.3 (L) 2.8 (LL) 3.0 (L) 3.0 (L) 3.2 (L) 2.8 (LL) 2.9 (LL) 3.3 (L)   Chloride 98 - 112 mmol/L 117 (H) 117 (H) 117 (H) 116 (H) 115 (H) 114 (H) 115 (H) 113 (H) 115 (H) 115 (H)   Carbon Dioxide, Total 21.0 - 32.0 mmol/L 11.0 (L) 13.0 (L) 16.0 (L) 17.0 (L) 17.0 (L) 19.0 (L) 20.0 (L) 21.0 21.0 24.0   BUN 9 - 23 mg/dL 10 9 8 (L) 7 (L) 6 (L) 5 (L) 4 (L) 3 (L) 3 (L) 2 (L)   CREATININE 0.70 - 1.30 mg/dL 1.05 0.96 0.93 0.84 0.77 0.91 0.67 (L) 0.64 (L) 0.60 (L)  0.60 (L) 0.60 (L)   CALCIUM 8.5 - 10.1 mg/dL  7.7 (L) 7.9 (L) 7.2 (L) 7.3 (L) 7.5 (L) 7.7 (L) 7.5 (L) 7.4 (L) 7.7 (L) 7.4 (L)   (LL): Data is critically low  (H): Data is abnormally high  (L): Data is abnormally low      Vitals (last day) before discharge       Date/Time Temp Pulse Resp BP SpO2 Weight O2 Device O2 Flow Rate (L/min) [facility]    01/01/24 0800 98.4 °F (36.9 °C) 107 20 114/67 96 % -- None (Room air) -- ANNELISE    01/01/24 0600 -- 114 25 110/70 95 % -- -- -- SD    01/01/24 0400 100.2 °F (37.9 °C) 90 20 108/70 94 % -- None (Room air) -- SD    01/01/24 0200 -- 88 21 114/70 94 % -- -- -- SD    01/01/24 0100 98.2 °F (36.8 °C) 83 12 -- 97 % -- -- -- SD    01/01/24 0000 -- 87 17 107/74 95 % -- None (Room air) -- SD    12/31/23 2200 -- 92 26 143/97 96 % -- -- -- SD    12/31/23 2000 98.5 °F (36.9 °C) 93 22 125/75 95 % -- None (Room air) -- SD    12/31/23 1800 -- 77 17 130/90 99 % -- -- -- AM    12/31/23 1600 98.8 °F (37.1 °C) 92 17 127/82 98 % -- None (Room air) -- AM    12/31/23 1400 -- 98 22 112/83 99 % -- -- -- AM    12/31/23 1200 98.8 °F (37.1 °C) 99 17 132/86 100 % -- None (Room air) -- AM    12/31/23 1000 -- 103 21 115/77 96 % -- -- -- AM    12/31/23 0800 99 °F (37.2 °C) 96 17 105/69 96 % -- None (Room air) -- AM    12/31/23 0600 -- 91 16 111/71 96 % -- -- -- SD    12/31/23 0500 -- 82 15 -- 97 % -- -- -- SD    12/31/23 0400 -- 93 19 124/93 99 % 123 lb 7.3 oz None (Room air) -- SD    12/31/23 0300 -- 85 15 -- 97 % -- -- -- SD    12/31/23 0200 -- 84 16 120/75 97 % -- -- -- SD    12/31/23 0100 -- 92 15 -- 99 % -- -- -- SD    12/31/23 0000 98.5 °F (36.9 °C) 91 16 99/65 97 % -- None (Room air) -- SD

## 2024-01-08 ENCOUNTER — TELEPHONE (OUTPATIENT)
Dept: ENDOCRINOLOGY CLINIC | Facility: CLINIC | Age: 24
End: 2024-01-08

## 2024-01-08 ENCOUNTER — OFFICE VISIT (OUTPATIENT)
Dept: ENDOCRINOLOGY CLINIC | Facility: CLINIC | Age: 24
End: 2024-01-08
Payer: COMMERCIAL

## 2024-01-08 VITALS
OXYGEN SATURATION: 98 % | DIASTOLIC BLOOD PRESSURE: 62 MMHG | HEART RATE: 78 BPM | WEIGHT: 137.81 LBS | BODY MASS INDEX: 24 KG/M2 | SYSTOLIC BLOOD PRESSURE: 124 MMHG | RESPIRATION RATE: 16 BRPM

## 2024-01-08 DIAGNOSIS — H53.9 VISION CHANGES: ICD-10-CM

## 2024-01-08 DIAGNOSIS — E10.65 TYPE 1 DIABETES MELLITUS WITH HYPERGLYCEMIA (HCC): Primary | ICD-10-CM

## 2024-01-08 PROBLEM — E87.1 HYPONATREMIA: Status: RESOLVED | Noted: 2023-12-28 | Resolved: 2024-01-08

## 2024-01-08 PROBLEM — R73.9 HYPERGLYCEMIA: Status: RESOLVED | Noted: 2023-12-28 | Resolved: 2024-01-08

## 2024-01-08 PROBLEM — N17.9 ACUTE KIDNEY INJURY (HCC): Status: RESOLVED | Noted: 2023-12-28 | Resolved: 2024-01-08

## 2024-01-08 PROBLEM — N17.9 ACUTE KIDNEY INJURY: Status: RESOLVED | Noted: 2023-12-28 | Resolved: 2024-01-08

## 2024-01-08 PROBLEM — E10.10 TYPE 1 DIABETES MELLITUS WITH KETOACIDOSIS WITHOUT COMA (HCC): Status: RESOLVED | Noted: 2023-12-28 | Resolved: 2024-01-08

## 2024-01-08 PROBLEM — E87.20 METABOLIC ACIDOSIS: Status: RESOLVED | Noted: 2023-12-28 | Resolved: 2024-01-08

## 2024-01-08 LAB
CREAT UR-SCNC: 87.2 MG/DL
GLUCOSE BLOOD: 268
MICROALBUMIN UR-MCNC: 0.85 MG/DL
MICROALBUMIN/CREAT 24H UR-RTO: 9.7 UG/MG (ref ?–30)
TEST STRIP LOT #: NORMAL NUMERIC

## 2024-01-08 PROCEDURE — 82043 UR ALBUMIN QUANTITATIVE: CPT | Performed by: NURSE PRACTITIONER

## 2024-01-08 PROCEDURE — 3078F DIAST BP <80 MM HG: CPT | Performed by: NURSE PRACTITIONER

## 2024-01-08 PROCEDURE — 99215 OFFICE O/P EST HI 40 MIN: CPT | Performed by: NURSE PRACTITIONER

## 2024-01-08 PROCEDURE — 3074F SYST BP LT 130 MM HG: CPT | Performed by: NURSE PRACTITIONER

## 2024-01-08 PROCEDURE — 82570 ASSAY OF URINE CREATININE: CPT | Performed by: NURSE PRACTITIONER

## 2024-01-08 PROCEDURE — 82947 ASSAY GLUCOSE BLOOD QUANT: CPT | Performed by: NURSE PRACTITIONER

## 2024-01-08 RX ORDER — ACYCLOVIR 400 MG/1
1 TABLET ORAL
Qty: 9 EACH | Refills: 3 | Status: SHIPPED | OUTPATIENT
Start: 2024-01-08

## 2024-01-08 RX ORDER — GLUCAGON INJECTION, SOLUTION 1 MG/.2ML
1 INJECTION, SOLUTION SUBCUTANEOUS AS NEEDED
Qty: 0.4 ML | Refills: 1 | Status: SHIPPED | OUTPATIENT
Start: 2024-01-08

## 2024-01-08 NOTE — TELEPHONE ENCOUNTER
Dexcom G7 ordered to Pharmacy   In 2023 not pharmacy benefit but pt states he changed his plan for 2024   Advised to call us if not covered or co pay too high

## 2024-01-08 NOTE — PATIENT INSTRUCTIONS
CAITLYN :insulin cost program:     Pay $35 for a monthly supply of any combination of Caitlyn Nordisk insulin products, up to 3 vials or 2 packs of pens   If you are uninsured or have commercial insurance and want to register via text to receive your MyInsulinRx™ Offer, text [phone] to [phone] to sign up.        Dexcom sensors are either covered under pharmacy or medical benefits   We will send dexcom again to [facility] to see if covered  If cost is more than $75 or NOT covered, please call or my chart message me - so we can send order to medical plan for benefit investigation       Continue:     Tresiba U 200: 25 units once daily     Humalog kwik pen :   ICR 1:10  Correction  40   Correct above 120 ( correct above 180)     Avoid giving another correction for at least 3 hours   Always give humalog for any carbs eaten     Fluctuations in blood sugars are best detected by testing your sugar with your meter.   In order for me to determine any patterns in your blood sugars, you will need to test your blood sugar 1- 2 times daily     It would be best to change up the times of day that you are testing your sugar.   Always test before breakfast (fasting) and then alternate testing blood sugar 1-2 hours after your meals.     American Diabetes Association: blood sugar targets:     Fasting blood sugar (before breakfast) Target:    (ideally less than 110)  2 hours after eating less than 180 (ideally less than  150 )     Call for blood sugars less than  75 or greater than  200 more than 2 times in a week       Watch for low blood sugars: (less than 70 )    Treatment of Low Blood Glucose Action Plan  1. Check blood glucose to be sure that it is low. You cannot  always go by symptoms or how you feel. If in doubt, treat your low blood glucose anyway.  Rule of 15 :     2. Take 15 grams of carbohydrate (carb). Here are some choices:    4 oz. regular fruit juice  3-4 glucose tablets  6 oz. regular soda   7-8 jelly beans    3. Recheck blood  glucose after 10-15 minutes. If blood glucose is still low (less than 70 mg/dl) repeat the treatment (step 2).    4. If your next meal is more than one hour away, eat a small snack.    5. If you’re not sure what caused your low blood glucose, call your healthcare provider.    6. Always check your blood glucose before you drive       Severe hypoglycemia  Patient Education: Glucagon Emergency Kit      When low blood sugar isn’t treated and you need someone to help you recover, it is considered a severe event.     Treating severe hypoglycemia  Glucagon is a hormone produced in the pancreas that stimulates your liver to release stored glucose into your bloodstream when your blood sugar levels are too low.   Glucagon is an emergency medicine that will raise your blood sugar if you are unable to eat or drink. It is an important part of being prepared if you have diabetes and take insulin or a medication that can cause hypoglycemia (low blood sugar).     Glucagon is available by prescription and is either injected or administered or puffed into the nostril.   The people you are in frequent contact with (for example, friends, family members and coworkers) should be instructed on how to give you glucagon to treat severe hypoglycemia.   If you are someone who is at risk for hypoglycemia, you should have a current (nonexpired) glucagon emergency kit on hand AND someone who knows where it is kept and how to use it.       When is the Glucagon Emergency Kit used?  Glucagon should be used to treat hypoglycemia if you are unconscious, unresponsive(meaning you cannot follow commands, even if you appear to be awake) or cannot swallow or keep any food/liquids down.    What is in the Glucagon Emergency Kit?  Your glucagon kit contains picture instructions on how to use it. Be sure to check the date on the kit periodically to make sure your kit has not .    Is glucagon dangerous?  Glucagon is a safe drug. In fact, everyone's body  contains natural glucagon to help keep blood sugar level. You need a prescription to obtain glucagon, because it is an injectable emergency medication. There is no danger of overdose. However, it is for emergencies and should be used for such.     What to expect after dosing:   In about 5 to 15 minutes, the person should respond and begin glen ome out of hypoglycemia. Sometimes a person may experience nausea or may vomit after responding to glucagon. Although initially the blood sugar may spike to a high level, it is important that the patient eat some food containing carbohydrate as soon as you are able since the injectable glucose wears off - .    What else do I need to know?  If the person does not respond within 15 minutes, call 911 immediately. It is possible that the person may be in a coma from severe hyperglycemia (very high blood sugar),rather than hypoglycemia, in which case, glucagon will not help.  Notify your diabetes provider whenever you experience a low blood sugar that requires glucagon. So you  can discuss ways to prevent severe hypoglycemia in the future.  Get your prescription refilled immediately.

## 2024-01-16 ENCOUNTER — TELEPHONE (OUTPATIENT)
Dept: ENDOCRINOLOGY CLINIC | Facility: CLINIC | Age: 24
End: 2024-01-16

## 2024-01-16 NOTE — TELEPHONE ENCOUNTER
Pt was started on Dexcom   Last A1c value was 14.8% done 12/28/2023.    Reviewed dexcom ; needs insulin dose changes based on trend patterns    -----------------------------  Dexcom Clarity  -----------------------------  Yusufgrayson Alberto  YOB: 2000  Generated at: Tue, Jan 16, 2024 1:50 PM CST  Reporting period: Wed Broderick 10, 2024 - Tue Jan 16, 2024  -----------------------------  Glucose Details  Average glucose: 239 mg/dL  Standard deviation: 82 mg/dL  GMI: N/A  -----------------------------  Time in Range  Very High: 45%  High: 29%  In Range: 25%  Low: <1%  Very Low: 0%      Appears to be dropping after prandial insulin and may be overreacting to hypoglycemia     Changes:     Change correction factor from 30 to 40 (+ 1 unit extra for BG over 180)  Correction doses for when BG is above 180 mg/dl (and correct to  mg/dl )       Change IC ratio fro 1:10 on off days to 1:15 to avoid lows  Keep work day IC ratio 1:20       Try to avoid overtreating low blood sugars--> if eating /drinking more than 15gm carb, needs to dose humalog to avoid rebound hyperglycemia    Remind pt even if BG is normal , and eating carbs to dose Humalog : 1:20  I/c ratio   Also discuss w pt entering data in clarity to help better track insulin doses in dexcom and impact on BG trends        Continue   Tresiba U 200 flex touch: 25 units once daily     Would benefit from insulin pump but he needs to show us that he can maintain diabetes follow ups before we order

## 2024-01-16 NOTE — TELEPHONE ENCOUNTER
Contacted pharmacy who reports dexcom G7 was picked up.  Pt not streaming, unsure if he has started.  Left message to call office, please see if he has started G7 and get him streaming.    Future Appointments   Date Time Provider Department Center   2/15/2024  2:30 PM Cecilia Fleming APRN [facility]   2/19/2024  9:00 AM Chiki Hernandez MD [facility]   6/24/2024  1:00 PM Cecilia Fleming APRN [facility]

## 2024-01-16 NOTE — TELEPHONE ENCOUNTER
Went over adjustments with pt. Reviewed rule of 15: treating with 15 carbs for glucose level below 70 mg/L; treating with 30 carbs for glucose level below 55 mg/dL. Try to just stick with 15 grams of carbs with low sugars to prevent reflex hyperglycemia. If not, 15 minutes after, dose for each 15 over per insulin to carb ratio. Reviewed how to make notes in Dexcom to see the effects on the glucose levels.     Pt verbalized understanding. No further questions at this time.

## 2024-02-08 ENCOUNTER — TELEPHONE (OUTPATIENT)
Dept: ENDOCRINOLOGY CLINIC | Facility: CLINIC | Age: 24
End: 2024-02-08

## 2024-02-08 NOTE — TELEPHONE ENCOUNTER
Left message for patient to please complete labs minimum of 48 hours prior to appt next week.  And/or contact office.    Future Appointments   Date Time Provider Department Center   2/15/2024  2:30 PM Cecilia Fleming APRN [facility]   2/19/2024  9:00 AM Chiki Hernandez MD [facility]   6/24/2024  1:00 PM Cecilia Fleming APRN [facility]

## 2024-02-08 NOTE — TELEPHONE ENCOUNTER
Pt not active on my chart   Last A1c value was 14.8% done 12/28/2023.    Has active lab orders; needs to update before next week DM appt     TSH, A1C, CMP

## 2024-02-15 ENCOUNTER — TELEPHONE (OUTPATIENT)
Dept: ENDOCRINOLOGY CLINIC | Facility: CLINIC | Age: 24
End: 2024-02-15

## 2024-02-15 ENCOUNTER — TELEMEDICINE (OUTPATIENT)
Dept: ENDOCRINOLOGY CLINIC | Facility: CLINIC | Age: 24
End: 2024-02-15
Payer: COMMERCIAL

## 2024-02-15 DIAGNOSIS — E10.65 TYPE 1 DIABETES MELLITUS WITH HYPERGLYCEMIA (HCC): Primary | ICD-10-CM

## 2024-02-15 DIAGNOSIS — E78.2 MIXED HYPERLIPIDEMIA: ICD-10-CM

## 2024-02-15 PROCEDURE — 95251 CONT GLUC MNTR ANALYSIS I&R: CPT | Performed by: NURSE PRACTITIONER

## 2024-02-15 PROCEDURE — 99214 OFFICE O/P EST MOD 30 MIN: CPT | Performed by: NURSE PRACTITIONER

## 2024-02-15 RX ORDER — INSULIN DEGLUDEC INJECTION 100 U/ML
INJECTION, SOLUTION SUBCUTANEOUS
Qty: 15 ML | Refills: 1 | OUTPATIENT
Start: 2024-02-15

## 2024-02-15 NOTE — PATIENT INSTRUCTIONS
Dexcom  customer service:  [phone].      Listen to prompts:     press #2 for Technical Support  or any error message     Report the failed sensor - for them to replace the sensor at no cost     We will send your contact info and insurance information into [employer] for T slim - they will contact you with the cost/coverage for pump - we can arrange training if you decide to go ahead with the pump     For now continue      Tresiba U 200: 25 units once daily   Humalog U 100 kwik pen :     Work days   ICR 20   ISF 40     Off days   ICR 10   ISF 40   BG target 120 mg/dl ( Correct above 180)   3 hour active insulin time      Watch for low blood sugars: (less than 70 )    Treatment of Low Blood Glucose Action Plan  1. Check blood glucose to be sure that it is low. You cannot  always go by symptoms or how you feel. If in doubt, treat your low blood glucose anyway.  Rule of 15 :     2. Take 15 grams of carbohydrate (carb). Here are some choices:    4 oz. regular fruit juice  3-4 glucose tablets  6 oz. regular soda   7-8 jelly beans    3. Recheck blood glucose after 10-15 minutes. If blood glucose is still low (less than 70 mg/dl) repeat the treatment (step 2).    4. If your next meal is more than one hour away, eat a small snack.    5. If you’re not sure what caused your low blood glucose, call your healthcare provider.    6. Always check your blood glucose before you drive

## 2024-02-15 NOTE — TELEPHONE ENCOUNTER
Sent patient information sheet to [employer] attached 2 LOV notes and patient Last A1c value was 14.8% done 12/28/2023.  Insurance card and faxed [phone] confirmed

## 2024-02-15 NOTE — PROGRESS NOTES
Due to COVID-19 ACTION PLAN, the patient's office visit was converted to a video visit. Please note that the following visit was completed using two-way, real-time interactive audio and video communication.  Time Spent:  19 min       No chief complaint on file.      Yusuf Alberto is a 23 year old presenting today  for type 1 diabetes management.   Primary care physician: Chiki Hernandez MD-  Last DM appt 1-2024     Last A1c value was 14.8% done 12/28/2023.  GMI on dexcom for past 30 d : 8.6%   Pt feels he is still struggling with the variability on work days and admits may be over treating the hypoglycemia  Also prefers to see his BG over 120 mg/dl and will over eat to keep it up and then struggles with hyperglycemia   Feels the work ICR 20 is too conservative as he has to correct often after meals     Has had 2 failed sensors - waiting for refill to be ready. Has hard time placing overage bandage on dexcom on UE       Dexcom CGM Analysis of data: 1.28.2024 thru 2.10.2024   Sensor download: full report in media  Average glucose : 222 mg/dl (last download 239 mg/dl )       CV (coefficient of variation) : 30.1%   GMI (estimated A1C) 8.6%    34% time above 180mg /dl  ( last download: 29%)      37% time above 250 mg/dl ( last download: 45%)      29% time in target range:  mg/dl ( last download: 25%)      0% time below 70mg/dl ( last download: <1%)      0 %time below 54mg/dl ( last download: 0%)      Findings:   1. Pattern of hypoglycemia: no patterns    2. Time in target: 29 % (ADA recommended goal > 70%)   3.  Minimal postprandial elevation  4. Normal glucose variability        Diabetes History:  Type 1 DM 2015   Patient has had hospitalizations for blood sugar issues: 2020 DKA (hyperglycemia/dental infection) , 1-2024 DKA (after covid/off insulin)   denies any history of pancreatitis      Previous DM therapies:  NOVOLOG :formulary   Lantus :formulary   Pao CGM (cost)     Current DM Regimen:  Tresiba U 200: 25  units once daily   Humalog U 100 kwik pen :       Work days   ICR 20   ISF 40     Off days   ICR 10   ISF 40   BG target 120 mg/dl ( Correct above 180)   3 hour active insulin time      HGBA1C:    Lab Results   Component Value Date    A1C 14.8 (H) 12/28/2023    A1C 16.1 (H) 03/02/2023    A1C >16.4 (H) 07/01/2020     (H) 12/28/2023       Lab Results   Component Value Date    CHOLEST 266 (H) 03/02/2023    CHOLEST 195.00 (H) 09/06/2019    TRIG 176 (H) 03/02/2023    TRIG 124.00 (H) 09/06/2019    HDL 51 03/02/2023    HDL 42 (L) 09/06/2019     (H) 03/02/2023     (H) 09/06/2019     Lab Results   Component Value Date    MICROALBCREA 9.7 01/08/2024    MICROALBCREA  03/02/2023      Comment:      Unable to calculate due to Urine Microalbumin <0.5 mg/dL        Lab Results   Component Value Date    CREATSERUM 0.59 (L) 01/01/2024    CREATSERUM 0.65 (L) 12/31/2023    EGFRCR 138 01/01/2024    EGFRCR 134 12/31/2023     Lab Results   Component Value Date    AST 13 (L) 12/28/2023    AST 5 (L) 03/02/2023    ALT 19 12/28/2023    ALT 18 03/02/2023       Lab Results   Component Value Date    TSH 3.180 03/02/2023    TSH 1.490 04/02/2015    T4F 1.3 04/02/2015           DM Complications:  Microvascular:   Neuropathy: no  Retinopathy: no  Nephropathy: no    Macrovascular:  PVD: no  CAD: no  Stroke/CVA: no        Modifying factors:  Medication adherence: improving since 1-2024   Recent steroids, illness or infections ( past 3m): recent COVID  , DKA      Allergies: Dust and Seasonal    Past Medical History:   Diagnosis Date    Asthma     Diabetes mellitus (HCC)     Extrinsic asthma, unspecified     Seasonal allergies     Type 1 diabetes mellitus (HCC)      No past surgical history on file.  Social History     Socioeconomic History    Marital status: Single   Tobacco Use    Smoking status: Never     Passive exposure: Never    Smokeless tobacco: Never   Vaping Use    Vaping Use: Never used   Substance and Sexual  Activity    Alcohol use: Not Currently     Alcohol/week: 4.0 standard drinks of alcohol     Types: 4 Glasses of wine per week    Drug use: Never     Social Determinants of Health     Financial Resource Strain: Low Risk  (1/2/2024)    Financial Resource Strain     Difficulty of Paying Living Expenses: Not hard at all     Med Affordability: No   Food Insecurity: No Food Insecurity (12/28/2023)    Food Insecurity     Food Insecurity: Never true   Transportation Needs: Unmet Transportation Needs (12/28/2023)    Transportation Needs     Lack of Transportation: Yes   Housing Stability: Low Risk  (12/28/2023)    Housing Stability     Housing Instability: No     Family History   Problem Relation Age of Onset    Hypertension Mother     Hypertension Maternal Grandmother     Diabetes Maternal Grandmother     Cancer Maternal Grandmother     Lipids Maternal Grandmother     Stroke Maternal Grandmother     Other (atrial flutter) Maternal Grandmother     Hypertension Maternal Grandfather     Lipids Maternal Grandfather     Cancer Maternal Grandfather      Current Medication List:   Current Outpatient Medications   Medication Sig Dispense Refill    insulin degludec (TRESIBA FLEXTOUCH) 100 UNIT/ML Subcutaneous Solution Pen-injector 25 units daily 15 mL 1    Continuous Blood Gluc Sensor (DEXCOM G7 SENSOR) Does not apply Misc 1 each Every 10 days. 9 each 3    glucagon (GVOKE HYPOPEN 2-PACK) 1 MG/0.2ML Subcutaneous SUBQ injection Inject 0.2 mL (1 mg total) into the skin as needed. 0.4 mL 1    Insulin Lispro, 1 Unit Dial, (HUMALOG KWIKPEN) 100 UNIT/ML Subcutaneous Solution Pen-injector USES UP TO 50 UNITS DAILY AS DIRECTEDUSES UP TO 50 UNITS DAILY AS DIRECTED. Check blood glucose 3 times daily with meals and at 9pm. Give 1 unit for every 50 points over 150. 15 mL 0    Insulin Pen Needle (PEN NEEDLES) 32G X 4 MM Does not apply Misc 5 each daily. 500 each 1           DM associated review of  symptoms:   Endocrine: Polyuria, polyphagia,  polydipsia: no  Neurological: Paresthesias: no  HEENT: Blurred vision: No  Skin: no rash or wounds  Hematological: Hypoglycemia: no      Review of Systems     LUNGS: denies shortness of breath    CARDIOVASCULAR: denies chest pain  GI: denies abdominal pain, nausea or diarrhea   : denies dysuria    Physical exam:  There were no vitals taken for this visit.  There is no height or weight on file to calculate BMI.    Physical Exam     Constitutional: Normal appearance   Cardiovascular: Not assessed   Pulmonary/Chest: Effort normal  Neurological: Alert and oriented .   Psychiatric: Normal mood and affect.       Assessment/Plan:      Dyslipidemia:   Last  labs done 3.2023- update ordered   Not on statin   Due to age, focus on lifestyle modifications        Type 1 diabetes mellitus with hyperglycemia (HCC)  A1C: 14.8 % --> needs update   GMI 8.6% on Dexcom    ( last weight: 137 lb )   Diabetes is still suboptimal     Reviewed with patient health impact associated with high glucose trends and the importance of better glucose control to prevent onset /progression of DM complications.   Pt agreeable to trial insulin pump ; prefers integration w Dexcom G7    Will send in CMN to [employer] for T slim X2 CIQ     Contact Dexcom about recent failed sensors for replacement   Change work day ICR 20--> 15   Continue ISF 40     Continue   Tresiba U 200 flex touch: 25 units once daily   Humalog Kwik pen:   Off day:   ICR 10   ISF 40     BG target 120 gm/dl (correct above 180 mg/dl)   3 hour active insulin time     Rx GVOKE 1-2024 for severe hypoglycemia   Discussed importance of site rotation         Reviewed clinical significance of A1c, adverse effects of suboptimal glucose control, and goals of therapy   Reviewed the A1C test, what the value reflects and the goal for the patient.   Reminded pt on A1C and blood sugar targets (Fasting < 130 and post prandial <180 ) and complications associated with hyperglycemia and uncontrolled DM (on  AVS)   Recommended SMBG 4- x daily if not using CGM     Reviewed s/s and treatment of hypoglycemia (on AVS)   Continue with lifestyle modifications since they have positive impact on diabetes/blood sugars/health (portion control, physical activity)   Reinforced timing and adherence with medication, self-monitoring of blood glucose and routine follow up    The patient is asked to return in  4-6 weeks video and   in office  but recommended to contact DM clinic sooner if questions or concerns.    The patient indicates understanding of these issues and agrees to the plan.      Orders Placed This Encounter    insulin degludec (TRESIBA FLEXTOUCH) 100 UNIT/ML Subcutaneous Solution Pen-injector     Si units daily     Dispense:  15 mL     Refill:  1     DM quality  A1C/Blood pressure: as reported above   Nephropathy screenin  no indication for ace /arb rx.    LIPID screening: 3.2023  . No  statin rx. , 38 yr old   Last dilated eye exam: No data recorded Exam shows retinopathy? No data recorded  Last diabetic foot exam: Last Foot Exam: 24  Date of last PHQ-2 depression screen: PHQ-2 - Date of last depression screenin2024  Referred to [facility]      The risks and benefits of my recommendations, as well as other treatment options were discussed with the patient today. questions were also answered to the best of my knowledge.         This has been done in good luis enrique to provide continuity of care in the best interest of the provider-patient relationship, due to the on-going public health crisis/national emergency and because of restrictions of visitation.  There are limitations of this visit as no or only very limited physical exam could be performed.  Every conscious effort was taken to allow for sufficient and adequate time.  This billing visit was spent on reviewing blood sugar trends, DM related labs, medications and decision making.  Appropriate medical decision-making and tests are  ordered as detailed in the plan of care above.      Yusuf Alberto understands phone or video evaluation is not a substitute for face-to-face examination or emergency care. Patient advised to go to ER or call 911 for worsening symptoms or acute distress.     Cecilia Fleming, APRN

## 2024-03-18 ENCOUNTER — TELEPHONE (OUTPATIENT)
Dept: ENDOCRINOLOGY CLINIC | Facility: CLINIC | Age: 24
End: 2024-03-18

## 2024-03-18 NOTE — TELEPHONE ENCOUNTER
Received [facility] for pt Mobi pump placed on CB desk to sign attached bar code to send after     Fax [phone]

## 2024-03-21 RX ORDER — PROCHLORPERAZINE 25 MG/1
1 SUPPOSITORY RECTAL
Qty: 3 EACH | Refills: 11 | Status: SHIPPED | OUTPATIENT
Start: 2024-03-21

## 2024-03-21 RX ORDER — PROCHLORPERAZINE 25 MG/1
1 SUPPOSITORY RECTAL
Qty: 1 EACH | Refills: 3 | Status: SHIPPED | OUTPATIENT
Start: 2024-03-21

## 2024-03-21 NOTE — TELEPHONE ENCOUNTER
Pt getting Dexxom through pharmacy 1/8/24 sent to [facility] [place] pended new Dexcom g6 Rx for [employer]obi [employer] pump

## 2024-04-08 RX ORDER — INSULIN LISPRO 100 [IU]/ML
INJECTION, SOLUTION INTRAVENOUS; SUBCUTANEOUS
Qty: 15 ML | Refills: 0 | Status: SHIPPED | OUTPATIENT
Start: 2024-04-08

## 2024-04-08 NOTE — TELEPHONE ENCOUNTER
Recievd fax from [facility] patient needs refill for Lispro   Requested Prescriptions     Pending Prescriptions Disp Refills    Insulin Lispro, 1 Unit Dial, (HUMALOG KWIKPEN) 100 UNIT/ML Subcutaneous Solution Pen-injector 15 mL 0     Sig: USES UP TO 50 UNITS DAILY AS DIRECTEDUSES UP TO 50 UNITS DAILY AS DIRECTED. Check blood glucose 3 times daily with meals and at 9pm. Give 1 unit for every 50 points over 150.     Future Appointments   Date Time Provider Department Center   5/6/2024  1:00 PM Chiki Hernandez MD [facility]   6/24/2024  1:00 PM Cecilia Fleming APRN [facility]     Last A1c value was 14.8% done 12/28/2023.  Refill 1/1/24   LOV 2/15/24

## 2024-04-08 NOTE — TELEPHONE ENCOUNTER
Pt was supposed to have 4- 6 week f/u after Feb 2024 appt   Will check w [facility] about pump status

## 2024-05-06 ENCOUNTER — OFFICE VISIT (OUTPATIENT)
Dept: INTERNAL MEDICINE CLINIC | Facility: CLINIC | Age: 24
End: 2024-05-06
Payer: COMMERCIAL

## 2024-05-06 VITALS
TEMPERATURE: 98 F | BODY MASS INDEX: 26.36 KG/M2 | OXYGEN SATURATION: 98 % | DIASTOLIC BLOOD PRESSURE: 76 MMHG | SYSTOLIC BLOOD PRESSURE: 118 MMHG | WEIGHT: 154.38 LBS | RESPIRATION RATE: 18 BRPM | HEART RATE: 92 BPM | HEIGHT: 64 IN

## 2024-05-06 DIAGNOSIS — E10.65 UNCONTROLLED TYPE 1 DIABETES MELLITUS WITH HYPERGLYCEMIA (HCC): ICD-10-CM

## 2024-05-06 DIAGNOSIS — Z23 NEED FOR VACCINATION: ICD-10-CM

## 2024-05-06 DIAGNOSIS — E78.2 MIXED HYPERLIPIDEMIA: ICD-10-CM

## 2024-05-06 DIAGNOSIS — Z00.00 ROUTINE GENERAL MEDICAL EXAMINATION AT A HEALTH CARE FACILITY: Primary | ICD-10-CM

## 2024-05-06 DIAGNOSIS — R86.9 SEMEN ABNORMAL: ICD-10-CM

## 2024-05-06 PROBLEM — U07.1 COVID: Status: RESOLVED | Noted: 2023-12-28 | Resolved: 2024-05-06

## 2024-05-06 LAB — HEMOGLOBIN A1C: 10.5 % (ref 4.3–5.6)

## 2024-05-06 RX ORDER — INSULIN DEGLUDEC 200 U/ML
25 INJECTION, SOLUTION SUBCUTANEOUS DAILY
COMMUNITY
Start: 2024-04-28

## 2024-05-06 NOTE — PROGRESS NOTES
Yusuf Alberto  8/5/2000    Chief Complaint   Patient presents with    Follow - Up     ES rm - f/u for DM       HPI:   Yusuf Alberto is a 23 year old male who presents for an annual physical examination.    The patient has remained inconsistent with dietary habits and blood glucose monitoring without current CGM use (awaiting sensors). Most recent HbA1c was 14.8% (12/2023). He has since increased physical activity as he walks 40 minutes one way to work (and back). He remains active while at work. Laboratory evaluation is pending collection.    He reports a reduced semen during ejaculation. No blood or associated symptoms.    Current Outpatient Medications   Medication Sig Dispense Refill    TRESIBA FLEXTOUCH 200 UNIT/ML Subcutaneous Solution Pen-injector Inject 25 Units into the skin daily.      Insulin Lispro, 1 Unit Dial, (HUMALOG KWIKPEN) 100 UNIT/ML Subcutaneous Solution Pen-injector USES UP TO 50 UNITS DAILY AS DIRECTEDUSES UP TO 50 UNITS DAILY AS DIRECTED. Check blood glucose 3 times daily with meals and at 9pm. Give 1 unit for every 50 points over 150. 15 mL 0    Continuous Blood Gluc Transmit (DEXCOM G6 TRANSMITTER) Does not apply Misc 1 each every 3 (three) months. 1 each 3    Continuous Blood Gluc Sensor (DEXCOM G6 SENSOR) Does not apply Misc 1 each Every 10 days. Use as directed every 10 days 3 each 11    glucagon (GVOKE HYPOPEN 2-PACK) 1 MG/0.2ML Subcutaneous SUBQ injection Inject 0.2 mL (1 mg total) into the skin as needed. 0.4 mL 1    Insulin Pen Needle (PEN NEEDLES) 32G X 4 MM Does not apply Misc 5 each daily. 500 each 1    Continuous Blood Gluc Sensor (DEXCOM G7 SENSOR) Does not apply Misc 1 each Every 10 days. (Patient not taking: Reported on 5/6/2024) 9 each 3      Allergies   Allergen Reactions    Dust ASTHMA    Seasonal       Past Medical History:    Asthma (HCC)    Diabetes mellitus (HCC)    Extrinsic asthma, unspecified    Seasonal allergies    Type 1 diabetes mellitus (HCC)      Patient  Active Problem List   Diagnosis    Type 1 diabetes mellitus with hyperglycemia (HCC)    Mixed hyperlipidemia    COVID      No past surgical history on file.   Family History   Problem Relation Age of Onset    Hypertension Mother     Hypertension Maternal Grandmother     Diabetes Maternal Grandmother     Cancer Maternal Grandmother     Lipids Maternal Grandmother     Stroke Maternal Grandmother     Other (atrial flutter) Maternal Grandmother     Hypertension Maternal Grandfather     Lipids Maternal Grandfather     Cancer Maternal Grandfather       Social History     Socioeconomic History    Marital status: Single   Tobacco Use    Smoking status: Never     Passive exposure: Never    Smokeless tobacco: Never   Vaping Use    Vaping status: Never Used   Substance and Sexual Activity    Alcohol use: Not Currently     Alcohol/week: 4.0 standard drinks of alcohol     Types: 4 Glasses of wine per week    Drug use: Never     Social Determinants of Health     Financial Resource Strain: Low Risk  (1/2/2024)    Financial Resource Strain     Difficulty of Paying Living Expenses: Not hard at all     Med Affordability: No   Food Insecurity: No Food Insecurity (12/28/2023)    Food Insecurity     Food Insecurity: Never true   Transportation Needs: Unmet Transportation Needs (12/28/2023)    Transportation Needs     Lack of Transportation: Yes   Housing Stability: Low Risk  (12/28/2023)    Housing Stability     Housing Instability: No         REVIEW OF SYSTEMS:   GENERAL: feels well otherwise  SKIN: no rashes  EYES:denies blurred vision or double vision  HEENT: not congested  LUNGS: denies shortness of breath with exertion  CARDIOVASCULAR: denies chest pain on exertion  GI: no nausea or abdominal pain  NEURO: denies headaches    EXAM:   /76 (BP Location: Left arm, Patient Position: Sitting, Cuff Size: adult)   Pulse 92   Temp 97.7 °F (36.5 °C) (Temporal)   Resp 18   Ht 5' 4\" (1.626 m)   Wt 154 lb 6.4 oz (70 kg)   SpO2 98%    BMI 26.50 kg/m²   GENERAL: Well developed, well nourished,in no apparent distress  SKIN: No rashes,no suspicious lesions  EYES: bilateral conjunctiva are clear  HEENT: atraumatic, normocephalic. TM WNL BL.  NECK: supple,no adenopathy,no bruits  LUNGS: clear to auscultation  CARDIO: RRR without murmur  GI: good BS's,no masses, HSM or tenderness    ASSESSMENT AND PLAN:   Yusuf Alberto is a 23 year old male who presents for an annual physical examination.    Outstanding screening and preventive measures:  None    Reduced semen amount:  Suspected retrograde ejaculation  Referred to urology service    DM1:  Uncontrolled, however improved from prior study: 10.5% today. Scheduled for follow-up with the diabetes clinic.   No microalbuminuria  Remainder of laboratory evaluation pending collection  Ophthalmologic evaluation advised    Mixed hyperlipidemia:  CMP and lipid panel pending collection; recommendation to follow  Dietary management reinforced     The patient indicates understanding of these issues and agrees to the plan.  TODAY'S ORDERS     Orders Placed This Encounter   Procedures    POC Hgb A1C       Meds & Refills:  Requested Prescriptions      No prescriptions requested or ordered in this encounter       Imaging & Consults:  UROLOGY - INTERNAL  OPHTHALMOLOGY - INTERNAL    No follow-ups on file.  There are no Patient Instructions on file for this visit.    All questions were answered and the patient agrees with the plan.     Thank you,  Chiki Hernandez MD

## 2024-05-24 RX ORDER — INSULIN LISPRO 100 [IU]/ML
INJECTION, SOLUTION INTRAVENOUS; SUBCUTANEOUS
Qty: 15 ML | Refills: 0 | Status: SHIPPED | OUTPATIENT
Start: 2024-05-24

## 2024-05-24 NOTE — TELEPHONE ENCOUNTER
Requested Prescriptions     Pending Prescriptions Disp Refills    Insulin Lispro, 1 Unit Dial, (HUMALOG KWIKPEN) 100 UNIT/ML Subcutaneous Solution Pen-injector [Pharmacy Med Name: Humalog Kwikpen 100 Unit/Ml Inj Lill] 15 mL 0     Sig: USE UP TO 50 UNITS DAILY AS DIRECTED. CHECK BLOOD GLUCOSE 3 TIMES DAILY WITH MEALS AND AT 9 PM. GIVE 1 UNIT FOR EVERY 50 POINTS OVER 150     Your appointments       Date & Time Appointment Department ([place])    Jun 24, 2024 1:00 PM CDT Diabetes Pump follow up with Cecilia Fleming APRN [facility], [address] [place] [address] ([facility])              [address] [address] [phone]          Last A1c value was 10.5% done 5/6/2024.    Refill 4/8/24  LOV 2/15/24

## 2024-05-28 ENCOUNTER — TELEPHONE (OUTPATIENT)
Dept: ENDOCRINOLOGY CLINIC | Facility: CLINIC | Age: 24
End: 2024-05-28

## 2024-05-28 NOTE — TELEPHONE ENCOUNTER
Left message to move patients appointment to [facility]    Normal vision: sees adequately in most situations; can see medication labels, newsprint

## 2024-06-24 ENCOUNTER — TELEPHONE (OUTPATIENT)
Dept: ENDOCRINOLOGY CLINIC | Facility: CLINIC | Age: 24
End: 2024-06-24

## 2024-07-11 ENCOUNTER — HOSPITAL ENCOUNTER (OUTPATIENT)
Age: 24
Discharge: HOME | End: 2024-07-11
Payer: COMMERCIAL

## 2024-07-11 VITALS
RESPIRATION RATE: 18 BRPM | DIASTOLIC BLOOD PRESSURE: 94 MMHG | WEIGHT: 155 LBS | SYSTOLIC BLOOD PRESSURE: 129 MMHG | OXYGEN SATURATION: 97 % | HEART RATE: 94 BPM | HEIGHT: 64 IN | TEMPERATURE: 98 F | BODY MASS INDEX: 26.46 KG/M2

## 2024-07-11 DIAGNOSIS — R59.1 LYMPHADENOPATHY: Primary | ICD-10-CM

## 2024-07-11 LAB
#MXD IC: 0.6 X10ˆ3/UL (ref 0.1–1)
HCT VFR BLD AUTO: 43.4 %
HGB BLD-MCNC: 14.7 G/DL
LYMPHOCYTES # BLD AUTO: 2.3 X10ˆ3/UL (ref 1–4)
LYMPHOCYTES NFR BLD AUTO: 30.3 %
MCH RBC QN AUTO: 28.7 PG (ref 26–34)
MCHC RBC AUTO-ENTMCNC: 33.9 G/DL (ref 31–37)
MCV RBC AUTO: 84.8 FL (ref 80–100)
MIXED CELL %: 7.7 %
NEUTROPHILS # BLD AUTO: 4.8 X10ˆ3/UL (ref 1.5–7.7)
NEUTROPHILS NFR BLD AUTO: 62 %
PLATELET # BLD AUTO: 227 X10ˆ3/UL (ref 150–450)
POCT MONO: NEGATIVE
RBC # BLD AUTO: 5.12 X10ˆ6/UL
WBC # BLD AUTO: 7.7 X10ˆ3/UL (ref 4–11)

## 2024-07-11 RX ORDER — AMOXICILLIN AND CLAVULANATE POTASSIUM 875; 125 MG/1; MG/1
1 TABLET, FILM COATED ORAL 2 TIMES DAILY
Qty: 20 TABLET | Refills: 0 | Status: SHIPPED | OUTPATIENT
Start: 2024-07-11 | End: 2024-07-21

## 2024-07-11 NOTE — ED PROVIDER NOTES
Patient Seen in: [facility]mediate [facility]      History     Chief Complaint   Patient presents with    Bump     Stated Complaint: Left side neck bump back of neck bump    Subjective:   The history is provided by the patient.       23-year-old male with past med history of asthma type 1 diabetes presents to the immediate care due to lymphadenopathy on the left side of his neck.  Patient noticed a small bump roughly 5 to 6 days ago on the left side of his neck.  No significant tenderness however over the last few days has noticed some mild tenderness.  Denies any fevers URI type symptoms.  No abnormal weight loss, night sweats.  Overall feels well.  Does have eczema patch on the posterior aspect of his neck this is not worse than previous. \"  My brother had something similar a few years ago they had to do blood work which was normal and placed him on antibiotics\" no family history of lymphoma, leukemia.    Has been consistent with his insulin, sugars well-controlled.  Denies any polydipsia or polyuria.    Objective:   Past Medical History:    Asthma (HCC)    Diabetes mellitus (HCC)    Extrinsic asthma, unspecified    Seasonal allergies    Type 1 diabetes mellitus (HCC)              History reviewed. No pertinent surgical history.             Social History     Socioeconomic History    Marital status: Single   Tobacco Use    Smoking status: Never     Passive exposure: Never    Smokeless tobacco: Never   Vaping Use    Vaping status: Never Used   Substance and Sexual Activity    Alcohol use: Not Currently     Alcohol/week: 4.0 standard drinks of alcohol     Types: 4 Glasses of wine per week    Drug use: Never     Social Determinants of Health     Financial Resource Strain: Low Risk  (1/2/2024)    Financial Resource Strain     Difficulty of Paying Living Expenses: Not hard at all     Med Affordability: No   Food Insecurity: No Food Insecurity (12/28/2023)    Food Insecurity     Food Insecurity: Never true    Transportation Needs: Unmet Transportation Needs (12/28/2023)    Transportation Needs     Lack of Transportation: Yes   Housing Stability: Low Risk  (12/28/2023)    Housing Stability     Housing Instability: No              Review of Systems   Constitutional: Negative.    HENT: Negative.     Respiratory: Negative.     Cardiovascular: Negative.    Gastrointestinal: Negative.        Positive for stated Chief Complaint: Bump    Other systems are as noted in HPI.  Constitutional and vital signs reviewed.      All other systems reviewed and negative except as noted above.    Physical Exam     ED Triage Vitals [07/11/24 0817]   BP (!) 143/93   Pulse 94   Resp 18   Temp 98.1 °F (36.7 °C)   Temp src Temporal   SpO2 97 %   O2 Device None (Room air)       Current Vitals:   Vital Signs  BP: (!) 129/94  Pulse: 94  Resp: 18  Temp: 98.1 °F (36.7 °C)  Temp src: Temporal    Oxygen Therapy  SpO2: 97 %  O2 Device: None (Room air)            Physical Exam  Vitals and nursing note reviewed.   Constitutional:       General: He is not in acute distress.     Appearance: Normal appearance. He is not toxic-appearing.   HENT:      Head: Normocephalic.      Right Ear: Tympanic membrane, ear canal and external ear normal.      Left Ear: Tympanic membrane, ear canal and external ear normal.      Nose: Nose normal.      Mouth/Throat:      Mouth: Mucous membranes are moist.      Pharynx: No oropharyngeal exudate or posterior oropharyngeal erythema.   Eyes:      Extraocular Movements: Extraocular movements intact.      Conjunctiva/sclera: Conjunctivae normal.      Pupils: Pupils are equal, round, and reactive to light.   Neck:      Comments: Isolated left anterior chain adenopathy.  No overlying skin changes.  Nontender.  There is a 6 x 4 cm erythematous patch with scaling noted on the posterior aspect of the neck.  No surrounding erythema or edema.  Not warm to the touch.    No Other lymphadenopathy.  Cardiovascular:      Rate and Rhythm:  Normal rate and regular rhythm.   Pulmonary:      Effort: Pulmonary effort is normal.      Breath sounds: Normal breath sounds.   Musculoskeletal:         General: Normal range of motion.      Cervical back: Normal range of motion. No tenderness.   Skin:     General: Skin is warm.   Neurological:      General: No focal deficit present.      Mental Status: He is oriented to person, place, and time.   Psychiatric:         Mood and Affect: Mood normal.         Behavior: Behavior normal.               ED Course     Labs Reviewed   POCT MONO TEST - Normal   POCT CBC                      MDM   Ddx -reactive lymphadenopathy, mononucleosis, lymphoma       Exam the patient is afebrile nontoxic.  Blood pressure is mildly elevated but asymptomatic.  There is an isolated anterior cervical lymphadenopathy on the left side of the neck.  No overlying skin changes.  HEENT exam is unremarkable except a psoriatic versus dermatitis patch on the posterior aspect of the neck.  There is no signs of secondary infection.  No other lymphadenopathy.  No red flag symptoms.  CBC unremarkable.  Monospot negative.  This is most likely reactive lymphadenopathy whether from the dermatitis patch versus a virus.  Advised to stop palpating lymph node as this can be falsely enlarging the lymph node.  Started omn Augmentin for lymphadenitis. Discussed the importance of close follow-up with his PCP.                              Medical Decision Making  Problems Addressed:  Lymphadenopathy: acute illness or injury    Amount and/or Complexity of Data Reviewed  Labs: ordered. Decision-making details documented in ED Course.    Risk  OTC drugs.  Prescription drug management.        Disposition and Plan     Clinical Impression:  1. Lymphadenopathy         Disposition:  Discharge  7/11/2024  8:45 am    Follow-up:  Chiki Hernandez MD  [address] [place] [id number]  [phone]                Medications Prescribed:  Discharge Medication List as  of 7/11/2024  8:45 AM        START taking these medications    Details   amoxicillin clavulanate 875-125 MG Oral Tab Take 1 tablet by mouth 2 (two) times daily for 10 days., Normal, Disp-20 tablet, R-0

## 2024-07-11 NOTE — DISCHARGE INSTRUCTIONS
Stop or touching the lymph node as this can increase the enlargement of the lymph node  Take the Augmentin twice a day until gone  Close follow-up with your primary care doctor if this continues for over the next week to 2 weeks may need further evaluation  Report to the emergency room symptoms worsen

## 2024-07-11 NOTE — ED INITIAL ASSESSMENT (HPI)
X5 days Pt noted a dime sized immobile lump on his left and back neck, only slightly painful with movement.    Denies: congestion/illness

## 2024-10-12 RX ORDER — INSULIN LISPRO 100 [IU]/ML
INJECTION, SOLUTION INTRAVENOUS; SUBCUTANEOUS
Qty: 15 ML | Refills: 0 | OUTPATIENT
Start: 2024-10-12

## 2024-10-29 ENCOUNTER — APPOINTMENT (OUTPATIENT)
Dept: GENERAL RADIOLOGY | Facility: HOSPITAL | Age: 24
End: 2024-10-29
Attending: HOSPITALIST
Payer: COMMERCIAL

## 2024-10-29 ENCOUNTER — HOSPITAL ENCOUNTER (INPATIENT)
Facility: HOSPITAL | Age: 24
LOS: 1 days | Discharge: HOME | End: 2024-10-30
Attending: EMERGENCY MEDICINE | Admitting: HOSPITALIST
Payer: COMMERCIAL

## 2024-10-29 DIAGNOSIS — E10.10 TYPE 1 DIABETES MELLITUS WITH KETOACIDOSIS WITHOUT COMA (HCC): Primary | ICD-10-CM

## 2024-10-29 LAB
ALBUMIN SERPL-MCNC: 5.3 G/DL (ref 3.2–4.8)
ALBUMIN/GLOB SERPL: 2.1 {RATIO} (ref 1–2)
ALP LIVER SERPL-CCNC: 87 U/L
ALT SERPL-CCNC: 12 U/L
ANION GAP SERPL CALC-SCNC: 12 MMOL/L (ref 0–18)
ANION GAP SERPL CALC-SCNC: 16 MMOL/L (ref 0–18)
ANION GAP SERPL CALC-SCNC: 20 MMOL/L (ref 0–18)
AST SERPL-CCNC: 13 U/L (ref ?–34)
ATRIAL RATE: 109 BPM
BASE EXCESS BLDV CALC-SCNC: -17.6 MMOL/L
BASOPHILS # BLD AUTO: 0.06 X10(3) UL (ref 0–0.2)
BASOPHILS NFR BLD AUTO: 0.7 %
BILIRUB SERPL-MCNC: 0.7 MG/DL (ref 0.3–1.2)
BILIRUB UR QL STRIP.AUTO: NEGATIVE
BUN BLD-MCNC: 11 MG/DL (ref 9–23)
BUN BLD-MCNC: 8 MG/DL (ref 9–23)
BUN BLD-MCNC: 9 MG/DL (ref 9–23)
CALCIUM BLD-MCNC: 10.2 MG/DL (ref 8.7–10.4)
CALCIUM BLD-MCNC: 8.9 MG/DL (ref 8.7–10.4)
CALCIUM BLD-MCNC: 9.2 MG/DL (ref 8.7–10.4)
CHLORIDE SERPL-SCNC: 102 MMOL/L (ref 98–112)
CHLORIDE SERPL-SCNC: 106 MMOL/L (ref 98–112)
CHLORIDE SERPL-SCNC: 107 MMOL/L (ref 98–112)
CLARITY UR REFRACT.AUTO: CLEAR
CO2 SERPL-SCNC: 10 MMOL/L (ref 21–32)
CO2 SERPL-SCNC: 14 MMOL/L (ref 21–32)
CO2 SERPL-SCNC: 16 MMOL/L (ref 21–32)
CREAT BLD-MCNC: 1.1 MG/DL
CREAT BLD-MCNC: 1.16 MG/DL
CREAT BLD-MCNC: 1.3 MG/DL
EGFRCR SERPLBLD CKD-EPI 2021: 78 ML/MIN/1.73M2 (ref 60–?)
EGFRCR SERPLBLD CKD-EPI 2021: 89 ML/MIN/1.73M2 (ref 60–?)
EGFRCR SERPLBLD CKD-EPI 2021: 95 ML/MIN/1.73M2 (ref 60–?)
EOSINOPHIL # BLD AUTO: 0.19 X10(3) UL (ref 0–0.7)
EOSINOPHIL NFR BLD AUTO: 2.1 %
ERYTHROCYTE [DISTWIDTH] IN BLOOD BY AUTOMATED COUNT: 13.4 %
EST. AVERAGE GLUCOSE BLD GHB EST-MCNC: 367 MG/DL (ref 68–126)
GLOBULIN PLAS-MCNC: 2.5 G/DL (ref 2–3.5)
GLUCOSE BLD-MCNC: 125 MG/DL (ref 70–99)
GLUCOSE BLD-MCNC: 135 MG/DL (ref 70–99)
GLUCOSE BLD-MCNC: 138 MG/DL (ref 70–99)
GLUCOSE BLD-MCNC: 144 MG/DL (ref 70–99)
GLUCOSE BLD-MCNC: 149 MG/DL (ref 70–99)
GLUCOSE BLD-MCNC: 151 MG/DL (ref 70–99)
GLUCOSE BLD-MCNC: 160 MG/DL (ref 70–99)
GLUCOSE BLD-MCNC: 161 MG/DL (ref 70–99)
GLUCOSE BLD-MCNC: 169 MG/DL (ref 70–99)
GLUCOSE BLD-MCNC: 172 MG/DL (ref 70–99)
GLUCOSE BLD-MCNC: 181 MG/DL (ref 70–99)
GLUCOSE BLD-MCNC: 181 MG/DL (ref 70–99)
GLUCOSE BLD-MCNC: 200 MG/DL (ref 70–99)
GLUCOSE BLD-MCNC: 230 MG/DL (ref 70–99)
GLUCOSE BLD-MCNC: 239 MG/DL (ref 70–99)
GLUCOSE BLD-MCNC: 244 MG/DL (ref 70–99)
GLUCOSE BLD-MCNC: 254 MG/DL (ref 70–99)
GLUCOSE UR STRIP.AUTO-MCNC: >1000 MG/DL
HBA1C MFR BLD: 14.4 % (ref ?–5.7)
HCO3 BLDV-SCNC: 11.1 MEQ/L (ref 22–26)
HCT VFR BLD AUTO: 51 %
HGB BLD-MCNC: 17.7 G/DL
IMM GRANULOCYTES # BLD AUTO: 0.03 X10(3) UL (ref 0–1)
IMM GRANULOCYTES NFR BLD: 0.3 %
KETONES UR STRIP.AUTO-MCNC: >150 MG/DL
LEUKOCYTE ESTERASE UR QL STRIP.AUTO: NEGATIVE
LYMPHOCYTES # BLD AUTO: 2.38 X10(3) UL (ref 1–4)
LYMPHOCYTES NFR BLD AUTO: 26 %
MAGNESIUM SERPL-MCNC: 1.7 MG/DL (ref 1.6–2.6)
MAGNESIUM SERPL-MCNC: 1.7 MG/DL (ref 1.6–2.6)
MCH RBC QN AUTO: 29.7 PG (ref 26–34)
MCHC RBC AUTO-ENTMCNC: 34.7 G/DL (ref 31–37)
MCV RBC AUTO: 85.6 FL
MONOCYTES # BLD AUTO: 0.68 X10(3) UL (ref 0.1–1)
MONOCYTES NFR BLD AUTO: 7.4 %
MRSA DNA SPEC QL NAA+PROBE: NEGATIVE
NEUTROPHILS # BLD AUTO: 5.83 X10 (3) UL (ref 1.5–7.7)
NEUTROPHILS # BLD AUTO: 5.83 X10(3) UL (ref 1.5–7.7)
NEUTROPHILS NFR BLD AUTO: 63.5 %
NITRITE UR QL STRIP.AUTO: NEGATIVE
OSMOLALITY SERPL CALC.SUM OF ELEC: 282 MOSM/KG (ref 275–295)
OSMOLALITY SERPL CALC.SUM OF ELEC: 283 MOSM/KG (ref 275–295)
OSMOLALITY SERPL CALC.SUM OF ELEC: 283 MOSM/KG (ref 275–295)
OXYHGB MFR BLDV: 95.5 % (ref 72–78)
P AXIS: 80 DEGREES
P-R INTERVAL: 132 MS
PCO2 BLDV: 23 MM HG (ref 38–50)
PH BLDV: 7.19 [PH] (ref 7.33–7.43)
PH UR STRIP.AUTO: 5.5 [PH] (ref 5–8)
PHOSPHATE SERPL-MCNC: 2.5 MG/DL (ref 2.4–5.1)
PHOSPHATE SERPL-MCNC: 2.7 MG/DL (ref 2.4–5.1)
PLATELET # BLD AUTO: 240 10(3)UL (ref 150–450)
PO2 BLDV: 90 MM HG (ref 30–50)
POTASSIUM SERPL-SCNC: 3.8 MMOL/L (ref 3.5–5.1)
POTASSIUM SERPL-SCNC: 3.9 MMOL/L (ref 3.5–5.1)
POTASSIUM SERPL-SCNC: 4.1 MMOL/L (ref 3.5–5.1)
PROT SERPL-MCNC: 7.8 G/DL (ref 5.7–8.2)
PROT UR STRIP.AUTO-MCNC: 30 MG/DL
Q-T INTERVAL: 322 MS
QRS DURATION: 86 MS
QTC CALCULATION (BEZET): 433 MS
R AXIS: 79 DEGREES
RBC # BLD AUTO: 5.96 X10(6)UL
RBC #/AREA URNS AUTO: >10 /HPF
SODIUM SERPL-SCNC: 132 MMOL/L (ref 136–145)
SODIUM SERPL-SCNC: 135 MMOL/L (ref 136–145)
SODIUM SERPL-SCNC: 136 MMOL/L (ref 136–145)
SP GR UR STRIP.AUTO: 1.03 (ref 1–1.03)
T AXIS: 65 DEGREES
UROBILINOGEN UR STRIP.AUTO-MCNC: NORMAL MG/DL
VENTRICULAR RATE: 109 BPM
WBC # BLD AUTO: 9.2 X10(3) UL (ref 4–11)

## 2024-10-29 PROCEDURE — 99253 IP/OBS CNSLTJ NEW/EST LOW 45: CPT | Performed by: CLINICAL NURSE SPECIALIST

## 2024-10-29 PROCEDURE — 99223 1ST HOSP IP/OBS HIGH 75: CPT | Performed by: HOSPITALIST

## 2024-10-29 PROCEDURE — 71045 X-RAY EXAM CHEST 1 VIEW: CPT | Performed by: HOSPITALIST

## 2024-10-29 RX ORDER — MELATONIN
3 NIGHTLY PRN
Status: DISCONTINUED | OUTPATIENT
Start: 2024-10-29 | End: 2024-10-30

## 2024-10-29 RX ORDER — SODIUM CHLORIDE 9 MG/ML
INJECTION, SOLUTION INTRAVENOUS CONTINUOUS
Status: DISCONTINUED | OUTPATIENT
Start: 2024-10-29 | End: 2024-10-30

## 2024-10-29 RX ORDER — POLYETHYLENE GLYCOL 3350 17 G/17G
17 POWDER, FOR SOLUTION ORAL DAILY PRN
Status: DISCONTINUED | OUTPATIENT
Start: 2024-10-29 | End: 2024-10-30

## 2024-10-29 RX ORDER — MAGNESIUM OXIDE 400 MG/1
400 TABLET ORAL ONCE
Status: COMPLETED | OUTPATIENT
Start: 2024-10-29 | End: 2024-10-29

## 2024-10-29 RX ORDER — DEXTROSE MONOHYDRATE 25 G/50ML
50 INJECTION, SOLUTION INTRAVENOUS
Status: DISCONTINUED | OUTPATIENT
Start: 2024-10-29 | End: 2024-10-30

## 2024-10-29 RX ORDER — SENNOSIDES 8.6 MG
17.2 TABLET ORAL NIGHTLY PRN
Status: DISCONTINUED | OUTPATIENT
Start: 2024-10-29 | End: 2024-10-30

## 2024-10-29 RX ORDER — ECHINACEA PURPUREA EXTRACT 125 MG
1 TABLET ORAL
Status: DISCONTINUED | OUTPATIENT
Start: 2024-10-29 | End: 2024-10-30

## 2024-10-29 RX ORDER — LANCETS 33 GAUGE
EACH MISCELLANEOUS
Qty: 100 EACH | Refills: 6 | Status: SHIPPED | OUTPATIENT
Start: 2024-10-29

## 2024-10-29 RX ORDER — PROCHLORPERAZINE EDISYLATE 5 MG/ML
5 INJECTION INTRAMUSCULAR; INTRAVENOUS EVERY 8 HOURS PRN
Status: DISCONTINUED | OUTPATIENT
Start: 2024-10-29 | End: 2024-10-30

## 2024-10-29 RX ORDER — HEPARIN SODIUM 5000 [USP'U]/ML
5000 INJECTION, SOLUTION INTRAVENOUS; SUBCUTANEOUS EVERY 8 HOURS SCHEDULED
Status: DISCONTINUED | OUTPATIENT
Start: 2024-10-29 | End: 2024-10-30

## 2024-10-29 RX ORDER — BENZONATATE 200 MG/1
200 CAPSULE ORAL 3 TIMES DAILY PRN
Status: DISCONTINUED | OUTPATIENT
Start: 2024-10-29 | End: 2024-10-30

## 2024-10-29 RX ORDER — ONDANSETRON 2 MG/ML
8 INJECTION INTRAMUSCULAR; INTRAVENOUS EVERY 6 HOURS PRN
Status: DISCONTINUED | OUTPATIENT
Start: 2024-10-29 | End: 2024-10-30

## 2024-10-29 RX ORDER — NICOTINE POLACRILEX 4 MG
15 LOZENGE BUCCAL
Status: DISCONTINUED | OUTPATIENT
Start: 2024-10-29 | End: 2024-10-30

## 2024-10-29 RX ORDER — BLOOD SUGAR DIAGNOSTIC
STRIP MISCELLANEOUS
Qty: 100 STRIP | Refills: 6 | Status: SHIPPED | OUTPATIENT
Start: 2024-10-29

## 2024-10-29 RX ORDER — POTASSIUM CHLORIDE 14.9 MG/ML
20 INJECTION INTRAVENOUS ONCE
Status: COMPLETED | OUTPATIENT
Start: 2024-10-29 | End: 2024-10-29

## 2024-10-29 RX ORDER — NICOTINE POLACRILEX 4 MG
30 LOZENGE BUCCAL
Status: DISCONTINUED | OUTPATIENT
Start: 2024-10-29 | End: 2024-10-30

## 2024-10-29 RX ORDER — BISACODYL 10 MG
10 SUPPOSITORY, RECTAL RECTAL
Status: DISCONTINUED | OUTPATIENT
Start: 2024-10-29 | End: 2024-10-30

## 2024-10-29 RX ORDER — ACETAMINOPHEN 500 MG
500 TABLET ORAL EVERY 4 HOURS PRN
Status: DISCONTINUED | OUTPATIENT
Start: 2024-10-29 | End: 2024-10-30

## 2024-10-29 RX ORDER — DEXTROSE MONOHYDRATE AND SODIUM CHLORIDE 5; .45 G/100ML; G/100ML
INJECTION, SOLUTION INTRAVENOUS CONTINUOUS
Status: DISCONTINUED | OUTPATIENT
Start: 2024-10-29 | End: 2024-10-30

## 2024-10-29 RX ORDER — BLOOD-GLUCOSE METER
EACH MISCELLANEOUS
Qty: 1 KIT | Refills: 0 | Status: SHIPPED | OUTPATIENT
Start: 2024-10-29

## 2024-10-29 NOTE — CONSULTS
ICU  Critical Care APRN Progress Note    NAME: Yusuf Alberto - ROOM: C2/C2 - MRN: [id number] - Age: 24 year old - :2000    History Of Present Illness:  Yusuf Alberto is a 24 year old male with PMHx significant for type 1 diabetes and asthma presents to ED with hyperglycemia.  Patient notes BG elevated for more than 24 hours with associated nausea and vomiting.  ER work up notes patient in DKA with pH 7.19 and CO2 10.  Patient given fluid bolus and started on insulin drip.  Patient to be admitted to ICU for insulin drip and close hemodynamic monitoring.    Patient to ICU on cart.  Patient notes he has a hard time with getting insulin related to the cost.    PMH:  Past Medical History:    Asthma (HCC)    Diabetes mellitus (HCC)    Extrinsic asthma, unspecified    Seasonal allergies    Type 1 diabetes mellitus (HCC)       Social Hx:  Social History     Socioeconomic History    Marital status: Single   Tobacco Use    Smoking status: Never     Passive exposure: Never    Smokeless tobacco: Never   Vaping Use    Vaping status: Never Used   Substance and Sexual Activity    Alcohol use: Not Currently     Alcohol/week: 4.0 standard drinks of alcohol     Types: 4 Glasses of wine per week    Drug use: Never     Social Drivers of Health     Financial Resource Strain: Low Risk  (2024)    Financial Resource Strain     Difficulty of Paying Living Expenses: Not hard at all     Med Affordability: No   Food Insecurity: No Food Insecurity (2023)    Food Insecurity     Food Insecurity: Never true   Transportation Needs: Unmet Transportation Needs (2023)    Transportation Needs     Lack of Transportation: Yes   Housing Stability: Low Risk  (2023)    Housing Stability     Housing Instability: No       Family Hx:  Family History   Problem Relation Age of Onset    Hypertension Mother     Hypertension Maternal Grandmother     Diabetes Maternal Grandmother     Cancer Maternal Grandmother     Lipids Maternal  Grandmother     Stroke Maternal Grandmother     Other (atrial flutter) Maternal Grandmother     Hypertension Maternal Grandfather     Lipids Maternal Grandfather     Cancer Maternal Grandfather          Review of Systems:   A comprehensive 10 point review of systems was completed.  Pertinent positives and negatives noted in the HPI.    OBJECTIVE  Vitals:  /77   Pulse 92   Temp 98.3 °F (36.8 °C) (Oral)   Resp 16   Ht 160 cm (5' 3\")   Wt 132 lb (59.9 kg)   SpO2 99%   BMI 23.38 kg/m²                Physical Exam:    General Appearance: Alert, cooperative, no distress, appears stated age  Neck: No JVD, neck supple, no adenopathy, trachea midline  Lungs: Clear to auscultation bilaterally, respirations unlabored  Heart: Regular rate and rhythm, S1 and S2 normal, no murmur, rub or gallop  Abdomen: Soft, non-tender, bowel sounds active all four quadrants, no masses, no organomegaly  Extremities: Extremities normal, atraumatic, no cyanosis or edema,capillary refill <3 sec.    Pulses: 2+ and symmetric all extremities  Skin: Skin color, texture, turgor normal for ethnicity, no rashes or lesions, warm and dry  Neurologic: CNII-XII intact, normal strength    Data this admission:  No results found.    Labs:  Lab Results   Component Value Date    WBC 9.2 10/29/2024    HGB 17.7 10/29/2024    HCT 51.0 10/29/2024    .0 10/29/2024    CREATSERUM 1.30 10/29/2024    BUN 11 10/29/2024     10/29/2024    K 3.9 10/29/2024     10/29/2024    CO2 10.0 10/29/2024     10/29/2024    CA 10.2 10/29/2024    ALB 5.3 10/29/2024    ALKPHO 87 10/29/2024    BILT 0.7 10/29/2024    TP 7.8 10/29/2024    AST 13 10/29/2024    ALT 12 10/29/2024       Assessment/Plan:    DKA  -insulin drip   -BMP, mag, phos q4H  -NPO  -Dextrose in IVF when glucose <250  -Diabetes APRN to see patient  -Social work to help with medication    Asthma  -continue home medications    F/E/N  -IVF  -NPO  -replete electrolytes as  needed    Proph  -SCD  -Lovenox    Dispo  -Full code  -ICU for risk of decompensation    Plan of care discussed with intensivist on-call, Dr Johnson.    A total of 45 minutes of critical care time (exclusive of billable procedures) was administered. This involved direct patient intervention, complex decision making, and/or extensive discussions with the patient, family, and clinical staff.

## 2024-10-29 NOTE — H&P
[facility]     Yusuf Alberto Patient Status:  Emergency    2000 MRN [id number]   Location [facility] EMERGENCY DEPARTMENT Attending Betty Bonner MD   Hosp Day # 0 PCP Chiki Hernandez MD     Chief Complaint: palpiations    Subjective:    History of Present Illness:     Yusuf Alberto is a 24 year old male with about 36 hours of palpitations, high sugars, nausea, as well as bout of emesis yesterday and two loose stools.  Has been compliant with insulin but started 70/30 a few weeks ago and has had much worse control of sugars since that time; ordinarily take 10 units short acting insulin with meals and 25 units long acting every day but hasn't been able to afford that so has been on 70/30.  Denies cough, fevers, chills, dysuria.  Feels exertionally dyspneic however.    History/Other:    Past Medical History:  Past Medical History:    Asthma (HCC)    Diabetes mellitus (HCC)    Extrinsic asthma, unspecified    Seasonal allergies    Type 1 diabetes mellitus (HCC)     Past Surgical History:   History reviewed. No pertinent surgical history.   Family History:   Family History   Problem Relation Age of Onset    Hypertension Mother     Hypertension Maternal Grandmother     Diabetes Maternal Grandmother     Cancer Maternal Grandmother     Lipids Maternal Grandmother     Stroke Maternal Grandmother     Other (atrial flutter) Maternal Grandmother     Hypertension Maternal Grandfather     Lipids Maternal Grandfather     Cancer Maternal Grandfather        hypertension Social History:    reports that he has never smoked. He has never been exposed to tobacco smoke. He has never used smokeless tobacco. He reports that he does not currently use alcohol after a past usage of about 4.0 standard drinks of alcohol per week. He reports that he does not use drugs.     Allergies: Allergies[1]    Medications:  Medications Ordered Prior to Encounter[2]    Review of Systems:   A comprehensive 12  point review of systems was completed.    Pertinent positives and negatives noted in the HPI.    Objective:   Physical Exam:    /74   Pulse 96   Temp 98.3 °F (36.8 °C) (Oral)   Resp 16   Ht 5' 3\" (1.6 m)   Wt 132 lb (59.9 kg)   SpO2 98%   BMI 23.38 kg/m²   General: No acute distress, Alert  Respiratory: No rhonchi, no wheezes  Cardiovascular: S1, S2. Regular rate and rhythm  Abdomen: Soft, NT/ND, +BS  Neuro: No new focal deficits  Extremities: No edema      Results:    Labs:      Labs Last 24 Hours:    Recent Labs   Lab 10/29/24  1033   RBC 5.96*   HGB 17.7*   HCT 51.0   MCV 85.6   MCH 29.7   MCHC 34.7   RDW 13.4   NEPRELIM 5.83   WBC 9.2   .0       Recent Labs   Lab 10/29/24  1033   *   BUN 11   CREATSERUM 1.30   EGFRCR 78   CA 10.2   ALB 5.3*   *   K 3.9      CO2 10.0*   ALKPHO 87   AST 13   ALT 12   BILT 0.7   TP 7.8       No results found for: \"PT\", \"INR\"    No results for input(s): \"TROP\", \"TROPHS\", \"CK\" in the last 168 hours.    No results for input(s): \"TROP\", \"PBNP\" in the last 168 hours.    No results for input(s): \"PCT\" in the last 168 hours.    Imaging: Imaging data reviewed in Epic.    Assessment & Plan:      #type 1 DKA, likely due to in adequate dosing insulin last few weeks (usually takes long and short acting but has been on 70/30 due to cost issues); DKA protocol/insulin drip, fluids, electrolyte monitoring    #dyspnea likely due to acidosis; however, check CXR to make sure not missing respiratory infection triggering DKA    #asthma-controlled    #hx DKA on one other occasion    Will do med rec once review complete.    Quality:  DVT prophylaxis:  SCDs, Lovenox  Code Status: see chart  Diamond: NO  Diamond Duration (in days): N/A  Central line: NO  Estimated discharge date: tbd    Plan of care discussed with patient and ER MD Corwin Tolbert MD    Supplementary Documentation:                                     [1]   Allergies  Allergen Reactions    Dust ASTHMA     Seasonal    [2]   No current facility-administered medications on file prior to encounter.     Current Outpatient Medications on File Prior to Encounter   Medication Sig Dispense Refill    Insulin Lispro, 1 Unit Dial, (HUMALOG KWIKPEN) 100 UNIT/ML Subcutaneous Solution Pen-injector USE UP TO 50 UNITS DAILY AS DIRECTED. CHECK BLOOD GLUCOSE 3 TIMES DAILY WITH MEALS AND AT 9 PM. GIVE 1 UNIT FOR EVERY 50 POINTS OVER 150 15 mL 0    TRESIBA FLEXTOUCH 200 UNIT/ML Subcutaneous Solution Pen-injector Inject 25 Units into the skin daily.      Continuous Blood Gluc Transmit (DEXCOM G6 TRANSMITTER) Does not apply Misc 1 each every 3 (three) months. (Patient not taking: Reported on 7/11/2024) 1 each 3    Continuous Blood Gluc Sensor (DEXCOM G6 SENSOR) Does not apply Misc 1 each Every 10 days. Use as directed every 10 days 3 each 11    Continuous Blood Gluc Sensor (DEXCOM G7 SENSOR) Does not apply Misc 1 each Every 10 days. (Patient not taking: Reported on 5/6/2024) 9 each 3    glucagon (GVOKE HYPOPEN 2-PACK) 1 MG/0.2ML Subcutaneous SUBQ injection Inject 0.2 mL (1 mg total) into the skin as needed. 0.4 mL 1    Insulin Pen Needle (PEN NEEDLES) 32G X 4 MM Does not apply Misc 5 each daily. 500 each 1

## 2024-10-29 NOTE — ED PROVIDER NOTES
Patient Seen in: [facility] Emergency Department      History     Chief Complaint   Patient presents with    Hyperglycemia     Stated Complaint: hyperglycima    Subjective:   HPI      Patient is a 24-year-old male with type 1 diabetes, asthma, history of DKA in the past presents ED for evaluation for concerns of DKA.  Has had uncontrollable sugars yesterday.  He had some nausea vomiting yesterday.  He took his insulin yesterday but today is not getting much better.  No fever chills or recent illness.  Complains of heart racing when he stands up.  No nausea vomiting today.  No diarrhea.  No other associated symptoms.  No other complaints    Objective:     Past Medical History:    Asthma (HCC)    Diabetes mellitus (HCC)    Extrinsic asthma, unspecified    Seasonal allergies    Type 1 diabetes mellitus (HCC)              History reviewed. No pertinent surgical history.             Social History     Socioeconomic History    Marital status: Single   Tobacco Use    Smoking status: Never     Passive exposure: Never    Smokeless tobacco: Never   Vaping Use    Vaping status: Never Used   Substance and Sexual Activity    Alcohol use: Not Currently     Alcohol/week: 4.0 standard drinks of alcohol     Types: 4 Glasses of wine per week    Drug use: Never     Social Drivers of Health     Financial Resource Strain: Low Risk  (1/2/2024)    Financial Resource Strain     Difficulty of Paying Living Expenses: Not hard at all     Med Affordability: No   Food Insecurity: No Food Insecurity (12/28/2023)    Food Insecurity     Food Insecurity: Never true   Transportation Needs: Unmet Transportation Needs (12/28/2023)    Transportation Needs     Lack of Transportation: Yes   Housing Stability: Low Risk  (12/28/2023)    Housing Stability     Housing Instability: No                  Physical Exam     ED Triage Vitals [10/29/24 1029]   /83   Pulse 108   Resp 24   Temp 98.3 °F (36.8 °C)   Temp src Oral   SpO2 100 %   O2 Device  None (Room air)       Current Vitals:   Vital Signs  BP: 130/83  Pulse: 108  Resp: 24  Temp: 98.3 °F (36.8 °C)  Temp src: Oral    Oxygen Therapy  SpO2: 100 %  O2 Device: None (Room air)        Physical Exam  Vitals and nursing note reviewed.   Constitutional:       General: He is not in acute distress.     Appearance: He is well-developed. He is not toxic-appearing.   HENT:      Head: Normocephalic and atraumatic.      Comments: .  Dry mucous membranes  Eyes:      General: No scleral icterus.     Conjunctiva/sclera: Conjunctivae normal.   Cardiovascular:      Rate and Rhythm: Normal rate.      Pulses: Normal pulses.   Pulmonary:      Effort: Pulmonary effort is normal. No respiratory distress.   Abdominal:      General: There is no distension.   Musculoskeletal:         General: No tenderness. Normal range of motion.      Cervical back: Normal range of motion and neck supple.   Skin:     General: Skin is warm and dry.      Findings: No rash.   Neurological:      General: No focal deficit present.      Mental Status: He is alert and oriented to person, place, and time.      Motor: No abnormal muscle tone.      Coordination: Coordination normal.   Psychiatric:         Behavior: Behavior normal.            ED Course     Labs Reviewed   CBC WITH DIFFERENTIAL WITH PLATELET - Abnormal; Notable for the following components:       Result Value    RBC 5.96 (*)     HGB 17.7 (*)     All other components within normal limits   COMP METABOLIC PANEL (14) - Abnormal; Notable for the following components:    Glucose 254 (*)     Sodium 132 (*)     CO2 10.0 (*)     Anion Gap 20 (*)     Albumin 5.3 (*)     A/G Ratio 2.1 (*)     All other components within normal limits   VENOUS BLOOD GAS - Abnormal; Notable for the following components:    Venous pH 7.19 (*)     Venous pCO2 23 (*)     Venous pO2 90 (*)     Venous HCO3 11.1 (*)     Venous O2Hb 95.5 (*)     All other components within normal limits   POCT GLUCOSE - Abnormal; Notable for  the following components:    POC Glucose 244 (*)     All other components within normal limits   URINALYSIS WITH CULTURE REFLEX   ACETONE   RAINBOW DRAW LAVENDER   RAINBOW DRAW LIGHT GREEN   RAINBOW DRAW BLUE   RAINBOW DRAW GOLD     EKG    Rate, intervals and axes as noted on EKG Report.  Rate: 109  Rhythm: Sinus Rhythm  Reading: Sinus tachycardia.  No ischemic changes                        MDM            -Comorbidities did add complexity to the management are mentioned in the HPI above        -I personally reviewed the prior external notes and the medical record to obtain additional history -reviewed last hospital admission December 28, 2023 patient admitted for DKA         -DDX: Includes but not limited to DKA, dehydration, which is a medical condition that can pose a threat to life/function    Labs Reviewed   CBC WITH DIFFERENTIAL WITH PLATELET - Abnormal; Notable for the following components:       Result Value    RBC 5.96 (*)     HGB 17.7 (*)     All other components within normal limits   COMP METABOLIC PANEL (14) - Abnormal; Notable for the following components:    Glucose 254 (*)     Sodium 132 (*)     CO2 10.0 (*)     Anion Gap 20 (*)     Albumin 5.3 (*)     A/G Ratio 2.1 (*)     All other components within normal limits   VENOUS BLOOD GAS - Abnormal; Notable for the following components:    Venous pH 7.19 (*)     Venous pCO2 23 (*)     Venous pO2 90 (*)     Venous HCO3 11.1 (*)     Venous O2Hb 95.5 (*)     All other components within normal limits   POCT GLUCOSE - Abnormal; Notable for the following components:    POC Glucose 244 (*)     All other components within normal limits   URINALYSIS WITH CULTURE REFLEX   ACETONE   RAINBOW DRAW LAVENDER   RAINBOW DRAW LIGHT GREEN   RAINBOW DRAW BLUE   RAINBOW DRAW GOLD     Labs reviewed pH is 7.19.  He has anion gap metabolic acidosis on the chemistries.  Hemoglobin is and concentrated hemoglobin 17.7.  Urinalysis pending at this time potassium 3.9.  He is given  IV fluids.  Start insulin drip.  Started on dextrose containing IV fluids.  Potassium replacement.  Discussed with intensivist and hospitalist patient will be admitted to ICU    A total of 35 minutes of critical care time (exclusive of billable procedures) was administered to manage the patient's critical lab values and metabolic instability due to his DKA.  This involved direct patient intervention, complex decision making, and/or extensive discussions with the patient, family, and clinical staff.               Admission disposition: 10/29/2024 11:07 AM           Medical Decision Making      Disposition and Plan     Clinical Impression:  1. Type 1 diabetes mellitus with ketoacidosis without coma (HCC)         Disposition:  Admit  10/29/2024 11:07 am    Follow-up:  No follow-up provider specified.        Medications Prescribed:  Current Discharge Medication List              Supplementary Documentation:         Hospital Problems       Present on Admission  Date Reviewed: 5/6/2024            ICD-10-CM Noted POA    * (Principal) Type 1 diabetes mellitus with ketoacidosis without coma (HCC) E10.10 10/29/2024 Unknown

## 2024-10-29 NOTE — ED INITIAL ASSESSMENT (HPI)
To the ED with c/o hyperglycemia since yesterday. Patient states his glucose has been around 390s since yesterday. Hx of DKA x1. Patient gave himself 5 units of 70/30 last noc. Glucose of 244 upon arrival to the ED.

## 2024-10-29 NOTE — CONSULTS
[facility]  Diabetes Consult Note    Yusuf Alberto Patient Status:  Inpatient    2000 MRN [id number]   Location [facility] 4SW-A Attending Corwin Nuñez MD   Hosp Day # 0 PCP Chiki Hernandez MD     Reason for Consult:   Management recommendations in setting of DKA in T1DM      Provider Requesting Consult:  Tyra Duran (ICU APN)      Diagnosis:  Patient Active Problem List   Diagnosis    Uncontrolled type 1 diabetes mellitus with hyperglycemia (HCC)    Mixed hyperlipidemia    Type 1 diabetes mellitus with ketoacidosis without coma (HCC)         Medical History:  Past Medical History:    Asthma (HCC)    Diabetes mellitus (HCC)    Extrinsic asthma, unspecified    Seasonal allergies    Type 1 diabetes mellitus (HCC)     History reviewed. No pertinent surgical history.  Family History   Problem Relation Age of Onset    Hypertension Mother     Hypertension Maternal Grandmother     Diabetes Maternal Grandmother     Cancer Maternal Grandmother     Lipids Maternal Grandmother     Stroke Maternal Grandmother     Other (atrial flutter) Maternal Grandmother     Hypertension Maternal Grandfather     Lipids Maternal Grandfather     Cancer Maternal Grandfather          Diabetes history:  Type:  T1DM  Onset:   Family history of DM: unknown      Allergies: Allergies[1]      Medications: Complete list reviewed. Active diabetes medications include degludec and aspart.      Labs:  Recent Labs   Lab 10/29/24  1026 10/29/24  1124 10/29/24  1234 10/29/24  1256 10/29/24  1407   PGLU 244* 230* 239* 200* 149*     Recent Labs     10/29/24  1033 10/29/24  1124 10/29/24  1407   *  --   --      --   --    CO2 10.0*  --   --    BUN 11  --   --    CREATSERUM 1.30  --   --    A1C 14.4*  --   --    PGLU  --    < > 149*   CA 10.2  --   --    ALB 5.3*  --   --     < > = values in this interval not displayed.                    History of Present Illness: Yusuf Alberto is a 24 year old male with a PMH of T1DM c/b  DKA x1 (12/2023) and asthma admitted 10/29/2024 with complaints of hyperglycemia x36 hours w/ nausea, vomiting and heart palpitations. ED evaluation revealed K 3.9, pH 7.19, CO2 10, anion gap 20, A1C 14.4% and ; patient was subsequently admitted for DKA treatment with anion gap metabolic acidosis.       Assessment/Recommendations:  Upon interview today, patient states he was diagnosed with T1DM in 2015; since that time, he has been on insulin, via both vial and pen. Patient states that over the last few months he has been unable to afford his insulin (states each box is ~$100/month) and CGMs. He states his PCP has switched him to 70/30 insulin d/t to inexpensive cost, but that he often does not remember to administer it BID. Patient states he works at [employer] and is highly active both at work and with a 40 minute walk each direction to/from work multiple days a week.     Explained to patient need for continuous insulin administration in setting of T1DM. Patient stated he was being worked up for a pump outpatient, but due to lack of showing up for appointments and lack of calling the clinic back, was not able to establish pump use. Explained to patient opportunities for insulin pen coupons, such as through [employer]. Patient unaware of drug savings programs; recommended patient print card to bring to pharmacy when picking up new insulin script. Patient aware of and endorses willingness to participate in plan of care.       Plan:  Inpatient recommendations -   Continue continuous insulin infusion per ICU team recommendations  Once stable to transition to subcutaneous insulin, recommend:  Degludec = 25u every day (similar to home doses)  Aspart = 1:40>140 & 1:10gm CHO (similar to home doses)  Discharge recommendations -   Restart home anti-diabetic regimen:  Tresiba = 25u every day  Novolog = 1:40>140 & 1:10gm CHO  OneTouch glucometer ordered for pickup at patient's preferred pharmacy      Prescription  Recommendations:  Commercial -  [employer] Out of State  Insulin:   Novolog, Fiasp, Apidra, Admelog, Levemir, Lantus, Basaglar, Tresiba, Toujeo   Supplies:  BD pen needles (Purvi); BD syringes  Glucometer:  One Touch   CGM:  Dexcom G6; FreeStyle Pao   Insulin Pump:  OmniPod       Provider F/U Recommendations:  Transition of Care Clinic  PCP - Dr. Hernandez [facility] - RAMÍREZ Mendez      A total of 60 minutes were spent with the patient, 100% was spent counseling and coordinating care for T2 diabetes self-management including nutrition, exercise, blood glucose monitoring, insulin administration, medications, treatment options, follow-up coordination and resources.    Rut Harvey, APRN  10/29/2024  2:54 PM       [1]   Allergies  Allergen Reactions    Dust ASTHMA    Seasonal

## 2024-10-29 NOTE — PLAN OF CARE
Assumed care of patient after Rn report. Patient alert and oriented x4. On RA. SR on monitor. Abdomen soft, nontender. Up to bathroom with assistance. Insulin gtt. See flowsheet for full assessment.

## 2024-10-30 VITALS
HEART RATE: 86 BPM | HEIGHT: 63 IN | BODY MASS INDEX: 22.69 KG/M2 | RESPIRATION RATE: 18 BRPM | TEMPERATURE: 98 F | WEIGHT: 128.06 LBS | OXYGEN SATURATION: 98 % | DIASTOLIC BLOOD PRESSURE: 71 MMHG | SYSTOLIC BLOOD PRESSURE: 106 MMHG

## 2024-10-30 LAB
ANION GAP SERPL CALC-SCNC: 10 MMOL/L (ref 0–18)
ANION GAP SERPL CALC-SCNC: 8 MMOL/L (ref 0–18)
ANION GAP SERPL CALC-SCNC: 9 MMOL/L (ref 0–18)
BUN BLD-MCNC: 7 MG/DL (ref 9–23)
CALCIUM BLD-MCNC: 8.9 MG/DL (ref 8.7–10.4)
CALCIUM BLD-MCNC: 9 MG/DL (ref 8.7–10.4)
CALCIUM BLD-MCNC: 9 MG/DL (ref 8.7–10.4)
CHLORIDE SERPL-SCNC: 107 MMOL/L (ref 98–112)
CHLORIDE SERPL-SCNC: 108 MMOL/L (ref 98–112)
CHLORIDE SERPL-SCNC: 109 MMOL/L (ref 98–112)
CHLORIDE UR-SCNC: 81 MMOL/L
CO2 SERPL-SCNC: 17 MMOL/L (ref 21–32)
CO2 SERPL-SCNC: 17 MMOL/L (ref 21–32)
CO2 SERPL-SCNC: 20 MMOL/L (ref 21–32)
CREAT BLD-MCNC: 1 MG/DL
CREAT BLD-MCNC: 1.03 MG/DL
CREAT BLD-MCNC: 1.12 MG/DL
EGFRCR SERPLBLD CKD-EPI 2021: 103 ML/MIN/1.73M2 (ref 60–?)
EGFRCR SERPLBLD CKD-EPI 2021: 106 ML/MIN/1.73M2 (ref 60–?)
EGFRCR SERPLBLD CKD-EPI 2021: 93 ML/MIN/1.73M2 (ref 60–?)
ERYTHROCYTE [DISTWIDTH] IN BLOOD BY AUTOMATED COUNT: 13.3 %
GLUCOSE BLD-MCNC: 126 MG/DL (ref 70–99)
GLUCOSE BLD-MCNC: 134 MG/DL (ref 70–99)
GLUCOSE BLD-MCNC: 148 MG/DL (ref 70–99)
GLUCOSE BLD-MCNC: 148 MG/DL (ref 70–99)
GLUCOSE BLD-MCNC: 158 MG/DL (ref 70–99)
GLUCOSE BLD-MCNC: 173 MG/DL (ref 70–99)
GLUCOSE BLD-MCNC: 178 MG/DL (ref 70–99)
GLUCOSE BLD-MCNC: 180 MG/DL (ref 70–99)
GLUCOSE BLD-MCNC: 187 MG/DL (ref 70–99)
GLUCOSE BLD-MCNC: 201 MG/DL (ref 70–99)
GLUCOSE BLD-MCNC: 205 MG/DL (ref 70–99)
GLUCOSE BLD-MCNC: 260 MG/DL (ref 70–99)
HCT VFR BLD AUTO: 46.1 %
HGB BLD-MCNC: 15.6 G/DL
MAGNESIUM SERPL-MCNC: 1.8 MG/DL (ref 1.6–2.6)
MAGNESIUM SERPL-MCNC: 1.9 MG/DL (ref 1.6–2.6)
MCH RBC QN AUTO: 29.3 PG (ref 26–34)
MCHC RBC AUTO-ENTMCNC: 33.8 G/DL (ref 31–37)
MCV RBC AUTO: 86.7 FL
OSMOLALITY SERPL CALC.SUM OF ELEC: 281 MOSM/KG (ref 275–295)
OSMOLALITY SERPL CALC.SUM OF ELEC: 284 MOSM/KG (ref 275–295)
OSMOLALITY SERPL CALC.SUM OF ELEC: 287 MOSM/KG (ref 275–295)
PHOSPHATE SERPL-MCNC: 2.1 MG/DL (ref 2.4–5.1)
PLATELET # BLD AUTO: 193 10(3)UL (ref 150–450)
POTASSIUM SERPL-SCNC: 3.5 MMOL/L (ref 3.5–5.1)
POTASSIUM SERPL-SCNC: 3.6 MMOL/L (ref 3.5–5.1)
POTASSIUM SERPL-SCNC: 4.6 MMOL/L (ref 3.5–5.1)
POTASSIUM SERPL-SCNC: 4.6 MMOL/L (ref 3.5–5.1)
POTASSIUM UR-SCNC: 26.3 MMOL/L
RBC # BLD AUTO: 5.32 X10(6)UL
SODIUM SERPL-SCNC: 13 MMOL/L
SODIUM SERPL-SCNC: 134 MMOL/L (ref 136–145)
SODIUM SERPL-SCNC: 135 MMOL/L (ref 136–145)
SODIUM SERPL-SCNC: 136 MMOL/L (ref 136–145)
WBC # BLD AUTO: 5.8 X10(3) UL (ref 4–11)

## 2024-10-30 PROCEDURE — 99239 HOSP IP/OBS DSCHRG MGMT >30: CPT | Performed by: INTERNAL MEDICINE

## 2024-10-30 PROCEDURE — 99233 SBSQ HOSP IP/OBS HIGH 50: CPT | Performed by: CLINICAL NURSE SPECIALIST

## 2024-10-30 PROCEDURE — 99233 SBSQ HOSP IP/OBS HIGH 50: CPT | Performed by: EMERGENCY MEDICINE

## 2024-10-30 RX ORDER — POTASSIUM CHLORIDE 1.5 G/1.58G
40 POWDER, FOR SOLUTION ORAL EVERY 4 HOURS
Status: COMPLETED | OUTPATIENT
Start: 2024-10-30 | End: 2024-10-30

## 2024-10-30 RX ORDER — ENOXAPARIN SODIUM 100 MG/ML
40 INJECTION SUBCUTANEOUS DAILY
Status: DISCONTINUED | OUTPATIENT
Start: 2024-10-30 | End: 2024-10-30

## 2024-10-30 RX ORDER — INSULIN DEGLUDEC 100 U/ML
25 INJECTION, SOLUTION SUBCUTANEOUS DAILY
Status: DISCONTINUED | OUTPATIENT
Start: 2024-10-30 | End: 2024-10-30

## 2024-10-30 RX ORDER — MAGNESIUM OXIDE 400 MG/1
400 TABLET ORAL ONCE
Status: COMPLETED | OUTPATIENT
Start: 2024-10-30 | End: 2024-10-30

## 2024-10-30 NOTE — PLAN OF CARE
Assumed care of patient following shift report. Patient is alert and oriented. Remains on insulin gtt, BMP q4. Patient denies pain. Resting in bed comfortably, call light in reach. See MAR & flowsheets for additional information.       Problem: Diabetes/Glucose Control  Goal: Glucose maintained within prescribed range  Description: INTERVENTIONS:  - Monitor Blood Glucose as ordered  - Assess for signs and symptoms of hyperglycemia and hypoglycemia  - Administer ordered medications to maintain glucose within target range  - Assess barriers to adequate nutritional intake and initiate nutrition consult as needed  - Instruct patient on self management of diabetes  Outcome: Progressing     Problem: PAIN - ADULT  Goal: Verbalizes/displays adequate comfort level or patient's stated pain goal  Description: INTERVENTIONS:  - Encourage pt to monitor pain and request assistance  - Assess pain using appropriate pain scale  - Administer analgesics based on type and severity of pain and evaluate response  - Implement non-pharmacological measures as appropriate and evaluate response  - Consider cultural and social influences on pain and pain management  - Manage/alleviate anxiety  - Utilize distraction and/or relaxation techniques  - Monitor for opioid side effects  - Notify MD/LIP if interventions unsuccessful or patient reports new pain  - Anticipate increased pain with activity and pre-medicate as appropriate  Outcome: Progressing     Problem: RISK FOR INFECTION - ADULT  Goal: Absence of fever/infection during anticipated neutropenic period  Description: INTERVENTIONS  - Monitor WBC  - Administer growth factors as ordered  - Implement neutropenic guidelines  Outcome: Progressing     Problem: SAFETY ADULT - FALL  Goal: Free from fall injury  Description: INTERVENTIONS:  - Assess pt frequently for physical needs  - Identify cognitive and physical deficits and behaviors that affect risk of falls.  - [place] fall precautions as  indicated by assessment.  - Educate pt/family on patient safety including physical limitations  - Instruct pt to call for assistance with activity based on assessment  - Modify environment to reduce risk of injury  - Provide assistive devices as appropriate  - Consider OT/PT consult to assist with strengthening/mobility  - Encourage toileting schedule  Outcome: Progressing

## 2024-10-30 NOTE — PLAN OF CARE
Assumed care of patient after RN report. Patient alert and oriented x4. On RA. SR on monitor. Up to bathroom with assist. Insulin gtt off. Tolerating diet. Discharge orders placed. Instructions provided, all questions answered. PIVs discontinued, no complications. Influenza vaccine given to R deltoid. See flowsheet for full assessment.

## 2024-10-30 NOTE — CONSULTS
[facility]  Diabetes Consult Note    Yusuf Alberto Patient Status:  Inpatient    2000 MRN [id number]   Location [facility] 4SW-A Attending Corwin Nuñez MD   Hosp Day # 1 PCP Chiki Hernandez MD     Reason for Consult:   Management recommendations in setting of DKA in T1DM      Provider Requesting Consult:  Tyra Duran (ICU APN)      Diagnosis:  Patient Active Problem List   Diagnosis    Uncontrolled type 1 diabetes mellitus with hyperglycemia (HCC)    Mixed hyperlipidemia    Type 1 diabetes mellitus with ketoacidosis without coma (HCC)         Medical History:  Past Medical History:    Asthma (HCC)    Diabetes mellitus (HCC)    Extrinsic asthma, unspecified    Seasonal allergies    Type 1 diabetes mellitus (HCC)     History reviewed. No pertinent surgical history.  Family History   Problem Relation Age of Onset    Hypertension Mother     Hypertension Maternal Grandmother     Diabetes Maternal Grandmother     Cancer Maternal Grandmother     Lipids Maternal Grandmother     Stroke Maternal Grandmother     Other (atrial flutter) Maternal Grandmother     Hypertension Maternal Grandfather     Lipids Maternal Grandfather     Cancer Maternal Grandfather          Diabetes history:  Type:  T1DM  Onset:   Family history of DM: unknown      Allergies: Allergies[1]      Medications: Complete list reviewed. Active diabetes medications include degludec and aspart.      Labs:  Recent Labs   Lab 10/30/24  0301 10/30/24  0459 10/30/24  0618 10/30/24  0713 10/30/24  0816   PGLU 158* 148* 134* 126* 148*     Recent Labs     10/29/24  1033 10/29/24  1124 10/29/24  1407   *  --   --      --   --    CO2 10.0*  --   --    BUN 11  --   --    CREATSERUM 1.30  --   --    A1C 14.4*  --   --    PGLU  --    < > 149*   CA 10.2  --   --    ALB 5.3*  --   --     < > = values in this interval not displayed.                    History of Present Illness: Yusuf Alberto is a 24 year old male with a PMH of T1DM c/b  DKA x1 (12/2023) and asthma admitted 10/29/2024 with complaints of hyperglycemia x36 hours w/ nausea, vomiting and heart palpitations. ED evaluation revealed K 3.9, pH 7.19, CO2 10, anion gap 20, A1C 14.4% and ; patient was subsequently admitted for DKA treatment with anion gap metabolic acidosis.       Assessment/Recommendations:  Upon interview today, patient states he is feeling better than yesterday; states he ate a full breakfast without issue. Patient received basal insulin this morning with plans to discontinue insulin infusion 3 hours post basal administration. Again, explained to patient need for continuous insulin administration in setting of T1DM; explained missing doses increases risk of DKA. Patient admitted today that he was being worked up for a pump outpatient, but due to lack of showing up for appointments and lack of calling the clinic back, was not able to establish pump use and dismissed from [facility]. Explained to patient importance of follow-up appointments and recommended establish care with [facility]; patient states understanding of need to show up to appointments, especially in regards to DM care. Explained to patient drug-savings program opportunities for insulin pen coupons, such as through [employer]. Patient aware of and endorses willingness to participate in plan of care.       Plan:  Inpatient recommendations -   Degludec = 25u every day (similar to home dose)  Aspart = 1:40>140 & 1:10gm CHO (similar to home dose)  Discharge recommendations -   Restart home anti-diabetic regimen:  Tresiba = 25u every day  Novolog = 1:40>140 & 1:10gm CHO  OneTouch glucometer ordered for pickup at patient's preferred pharmacy      Prescription Recommendations:  Commercial -  [employer] Out of State  Insulin:   Novolog, Fiasp, Apidra, Admelog, Levemir, Lantus, Basaglar, Tresiba, Toujeo   Supplies:  BD pen needles (Purvi); BD syringes  Glucometer:  One Touch   CGM:  Dexcom G6; FreeStyle  Pao   Insulin Pump:  OmniPod       Provider F/U Recommendations:  Transition of Care Clinic  PCP - Dr. Hernandez ([place])  Endocrinology - RAMÍREZ Adams      A total of 60 minutes were spent with the patient, 100% was spent counseling and coordinating care for T2 diabetes self-management including nutrition, exercise, blood glucose monitoring, insulin administration, medications, treatment options, follow-up coordination and resources.    Rut Harvey, APRN  10/30/2024  9:54 AM         [1]   Allergies  Allergen Reactions    Dust ASTHMA    Seasonal

## 2024-10-30 NOTE — DIETARY NOTE
[facility]   part of [facility]   CLINICAL NUTRITION    Yusuf Alberto admitted on 10/29 presents with DKA.    PMH: Asthma, Type 1 diabetes mellitus    Admitting diagnosis:  Type 1 diabetes mellitus with ketoacidosis without coma (HCC) [E10.10]    Ht: 160 cm (5' 3\")  Wt: 58.1 kg (128 lb 1.4 oz).   Body mass index is 22.69 kg/m².    Wt Readings from Last 6 Encounters:   10/30/24 58.1 kg (128 lb 1.4 oz)   07/11/24 70.3 kg (155 lb)   05/06/24 70 kg (154 lb 6.4 oz)   01/08/24 62.5 kg (137 lb 12.8 oz)   12/31/23 56 kg (123 lb 7.3 oz)   05/11/23 65 kg (143 lb 6.4 oz)        Labs/Meds reviewed    Diet:       Procedures    Carbohydrate controlled diet 1800 kcal/60 grams; Is Patient on Accuchecks? Yes       Percent Meals Eaten (last 3 days)       None          Pt chart reviewed d/t elevated A1c 14.4%. Visited pt at bedside. Pt reports his appetite at baseline is \"fine\" and usually eats 2 meals/day and sometimes HS snack (does not eat bfast d/t waking up and working late). Reports appetite today is getting better and ate bfast this AM. Denies GI symptoms at this time with last BM 10/28 per pt. No chewing or swallowing difficulties and NKFA. Reports UBW ~130-140 lbs with no significant changes recently.  Pt reports taking Humalog and Tresiba at home and does count CHO to dose his insulin. He reports he has no issues or concerns about CHO counting. Per endocrine APRN, pt's major barrier is financial and medication cost. All questions answered at this time.    Patient is at low nutrition risk at this time.    Please consult if patient status changes or nutrition issues arise.    Cely Hugo, ALYSSA, LDN, [facility]  Clinical Dietitian  Spectra: [phone]

## 2024-10-30 NOTE — PAYOR COMM NOTE
--------------  ADMISSION REVIEW     Payor: [employer] OUT OF STATE PPO  Subscriber #:  [id number]  Authorization Number: [id number]    Admit date: 10/29/24  Admit time: 12:54 PM       History   HPI  Patient is a 24-year-old male with type 1 diabetes, asthma, history of DKA in the past presents ED for evaluation for concerns of DKA.  Has had uncontrollable sugars yesterday.  He had some nausea vomiting yesterday.  He took his insulin yesterday but today is not getting much better.  No fever chills or recent illness.  Complains of heart racing when he stands up.  No nausea vomiting today.  No diarrhea.  No other associated symptoms.    ED Triage Vitals [10/29/24 1029]   /83   Pulse 108   Resp 24   Temp 98.3 °F (36.8 °C)   Temp src Oral   SpO2 100 %   O2 Device None (Room air)   HENT:      Head: Normocephalic and atraumatic.      Comments: .  Dry mucous membranes  Eyes:      General: No scleral icterus.     Conjunctiva/sclera: Conjunctivae normal.   Cardiovascular:      Rate and Rhythm: Normal rate.      Pulses: Normal pulses.   Pulmonary:      Effort: Pulmonary effort is normal. No respiratory distress.   Abdominal:      General: There is no distension.   Musculoskeletal:         General: No tenderness. Normal range of motion.      Cervical back: Normal range of motion and neck supple.   Skin:     General: Skin is warm and dry.      Findings: No rash.   Neurological:      General: No focal deficit present.      Mental Status: He is alert and oriented to person, place, and time.      Motor: No abnormal muscle tone.      Coordination: Coordination normal.     Labs Reviewed   CBC WITH DIFFERENTIAL WITH PLATELET - Abnormal; Notable for the following components:       Result Value    RBC 5.96 (*)     HGB 17.7 (*)     All other components within normal limits   COMP METABOLIC PANEL (14) - Abnormal; Notable for the following components:    Glucose 254 (*)     Sodium 132 (*)     CO2 10.0 (*)     Anion Gap 20 (*)      Albumin 5.3 (*)     A/G Ratio 2.1 (*)     All other components within normal limits   VENOUS BLOOD GAS - Abnormal; Notable for the following components:    Venous pH 7.19 (*)     Venous pCO2 23 (*)     Venous pO2 90 (*)     Venous HCO3 11.1 (*)     Venous O2Hb 95.5 (*)     All other components within normal limits   POCT GLUCOSE - Abnormal; Notable for the following components:    POC Glucose 244 (*)      Labs Reviewed   CBC WITH DIFFERENTIAL WITH PLATELET - Abnormal; Notable for the following components:       Result Value    RBC 5.96 (*)     HGB 17.7 (*)     All other components within normal limits   COMP METABOLIC PANEL (14) - Abnormal; Notable for the following components:    Glucose 254 (*)     Sodium 132 (*)     CO2 10.0 (*)     Anion Gap 20 (*)     Albumin 5.3 (*)     A/G Ratio 2.1 (*)     All other components within normal limits   VENOUS BLOOD GAS - Abnormal; Notable for the following components:    Venous pH 7.19 (*)     Venous pCO2 23 (*)     Venous pO2 90 (*)     Venous HCO3 11.1 (*)     Venous O2Hb 95.5 (*)     All other components within normal limits   POCT GLUCOSE - Abnormal; Notable for the following components:    POC Glucose 244 (*)    Admission disposition: 10/29/2024 11:07 AM    Disposition and Plan   Clinical Impression:  1. Type 1 diabetes mellitus with ketoacidosis without coma (HCC)       Disposition:  Admit  10/29/2024 11:07 am    History and Physical   History of Present Illness:   Yusuf Alberto is a 24 year old male with about 36 hours of palpitations, high sugars, nausea, as well as bout of emesis yesterday and two loose stools.  Has been compliant with insulin but started 70/30 a few weeks ago and has had much worse control of sugars since that time; ordinarily take 10 units short acting insulin with meals and 25 units long acting every day but hasn't been able to afford that so has been on 70/30.  Denies cough, fevers, chills, dysuria.  Feels exertionally dyspneic however.    Lab  10/29/24  1033   RBC 5.96*   HGB 17.7*   HCT 51.0   MCV 85.6   MCH 29.7   MCHC 34.7   RDW 13.4   NEPRELIM 5.83   WBC 9.2   .0      Lab 10/29/24  1033   *   BUN 11   CREATSERUM 1.30   EGFRCR 78   CA 10.2   ALB 5.3*   *   K 3.9      CO2 10.0*   ALKPHO 87   AST 13   ALT 12   BILT 0.7   TP 7.8       Assessment & Plan:    #type 1 DKA, likely due to in adequate dosing insulin last few weeks (usually takes long and short acting but has been on 70/30 due to cost issues); DKA protocol/insulin drip, fluids, electrolyte monitoring  #dyspnea likely due to acidosis; however, check CXR to make sure not missing respiratory infection triggering DKA#asthma-controlled  #hx DKA on one other occasion       MEDICATIONS ADMINISTERED IN LAST 1 DAY:  dextrose 5%-sodium chloride 0.45% infusion       Date Action Dose Route User    10/30/2024 0106 New Bag (none) Intravenous Raquel Jacques RN    10/29/2024 1148 New Bag (none) Intravenous Jose Lau RN          enoxaparin (Lovenox) 40 MG/0.4ML SUBQ injection 40 mg       Date Action Dose Route User    10/30/2024 0817 Given 40 mg Subcutaneous (Left Lower Abdomen) Leona Weaver RN          heparin (Porcine) 5000 UNIT/ML injection 5,000 Units       Date Action Dose Route User    10/30/2024 0501 Given 5,000 Units Subcutaneous (Right Lower Abdomen) Raquel Jacques RN    10/29/2024 2206 Given 5,000 Units Subcutaneous (Left Lower Abdomen) Raquel Jacques RN    10/29/2024 1607 Given 5,000 Units Subcutaneous (Left Lower Abdomen) Leona Weaver RN          insulin degludec (Tresiba) 100 units/mL flextouch 25 Units       Date Action Dose Route User    10/30/2024 0816 Given 25 Units Subcutaneous (Left Lower Abdomen) Leona Weaver, LINDSAY          insulin regular human (Novolin R, Humulin R) 100 Units in sodium chloride 0.9% 100 mL standard infusion (100 mL)       Date Action Dose Route User    10/29/2024 1157 New Bag 3 Units/hr Intravenous Jose Lau, RN           insulin regular human (Novolin R, Humulin R) 100 Units in sodium chloride 0.9% 100 mL standard infusion (100 mL)       Date Action Dose Route User    10/30/2024 0617 Rate/Dose Change 0.5 Units/hr Intravenous Raquel Jacques RN    10/30/2024 0304 Rate/Dose Change 1 Units/hr Intravenous Raquel Jacques, LINDSAY    10/30/2024 0112 Rate/Dose Change 1.5 Units/hr Intravenous Raquel Jacques RN    10/29/2024 2208 Rate/Dose Change 1 Units/hr Intravenous Raquel Jacques, LINDSAY    10/29/2024 2112 Rate/Dose Change 1.5 Units/hr Intravenous Raquel Jacques RN    10/29/2024 1900 Rate/Dose Change 1 Units/hr Intravenous Leona Weaver RN    10/29/2024 1600 Rate/Dose Change 0.5 Units/hr Intravenous Leona Weaver RN    10/29/2024 1500 Rate/Dose Change 1 Units/hr Intravenous Leona Weaver RN    10/29/2024 1409 Rate/Dose Change 0.5 Units/hr Intravenous Leona Weaver RN    10/29/2024 1258 New Bag 1.5 Units/hr Intravenous Leona Weaver RN          magnesium oxide (Mag-Ox) tab 400 mg       Date Action Dose Route User    10/29/2024 1759 Given 400 mg Oral Leona Weaver RN          magnesium oxide (Mag-Ox) tab 400 mg       Date Action Dose Route User    10/30/2024 0457 Given 400 mg Oral Raquel Jacques RN          potassium chloride (Klor-Con) 20 MEQ oral powder 40 mEq       Date Action Dose Route User    10/30/2024 0817 Given 40 mEq Oral Leona Weaver RN          potassium chloride 20 mEq/100mL IVPB premix 20 mEq       Date Action Dose Route User    10/29/2024 1141 New Bag 20 mEq Intravenous Jose Lau RN          sodium chloride 0.9 % IV bolus 1,000 mL       Date Action Dose Route User    10/29/2024 1036 New Bag 1,000 mL Intravenous Jose Lau RN          sodium phosphate 15 mmol in 0.9% NaCl 100mL IVPB premix       Date Action Dose Route User    10/30/2024 0456 Given 15 mmol Intravenous Aslam, Raquel, RN            Vitals (last day)       Date/Time Temp Pulse Resp BP SpO2 Weight O2 Device O2 Flow Rate (L/min) Who     10/30/24 0800 97.6 °F (36.4 °C) 70 -- 102/65 93 % -- -- -- LD    10/30/24 0000 99.4 °F (37.4 °C) 77 14 110/57 97 % 128 lb 1.4 oz (58.1 kg) None (Room air) -- AA    10/29/24 2000 98.6 °F (37 °C) 87 12 116/65 97 % -- None (Room air) -- AA    10/29/24 1254 97.8 °F (36.6 °C) -- -- -- -- -- -- -- MS    10/29/24 1029 98.3 °F (36.8 °C) 108 24 130/83 100 % 132 lb (59.9 kg) None (Room air) -- BS

## 2024-10-30 NOTE — CONSULTS
ICU  Critical Care APRN Progress Note    NAME: Yusuf Alberto - ROOM: C2/C2 - MRN: [id number] - Age: 24 year old - :2000    SUBJECTIVE: No issues overnight. Denies n/v and no diarrhea since admit. States willing to try to eat this morning.        OBJECTIVE  Vitals:  /67   Pulse 76   Temp 98.2 °F (36.8 °C) (Temporal)   Resp 15   Ht 160 cm (5' 3\")   Wt 128 lb 1.4 oz (58.1 kg)   SpO2 94%   BMI 22.69 kg/m²                Physical Exam:    General Appearance: Alert, cooperative, no distress, appears stated age  Neck: No JVD  Lungs: Clear to auscultation bilaterally, respirations unlabored  Heart: Regular rate and rhythm, S1 and S2 normal, no murmur, rub or gallop  Abdomen: Soft, non-tender, bowel sounds active  Extremities: Extremities normal, atraumatic, no cyanosis or edema,capillary refill <3 sec.    Pulses: 2+ and symmetric all extremities  Skin: Skin color, texture, turgor normal for ethnicity, no rashes or lesions, warm and dry  Neurologic: normal strength    Data this admission:  No results found.    Labs:  Lab Results   Component Value Date    WBC 5.8 10/30/2024    HGB 15.6 10/30/2024    HCT 46.1 10/30/2024    .0 10/30/2024    CREATSERUM 1.00 10/30/2024    BUN 7 10/30/2024     10/30/2024    K 3.5 10/30/2024     10/30/2024    CO2 17.0 10/30/2024     10/30/2024    CA 9.0 10/30/2024    ALB 5.3 10/29/2024    ALKPHO 87 10/29/2024    BILT 0.7 10/29/2024    TP 7.8 10/29/2024    AST 13 10/29/2024    ALT 12 10/29/2024    MG 1.8 10/30/2024    PHOS 2.5 10/29/2024       Assessment/Plan:    DKA,  history of DM type 1: Likely due to inadequate insulin dosing since being on  and non-compliant with BID administration, inability to afford insulin over past few months  -pH 7.19,  + ketones, AG 10 on admit  -Insulin drip per DKA protocol. Will transition to subcutaneous this morning  -AG now closed, bicarb remains low but NAGMA  -Diabetic diet  -Diabetes APRN  following, assisting  with drug savings programs  -Social work consulted    PATRIC: May be due to GI losses-Nausea/Vomiting, diarrhea PTA  -Bicarb 17, but improving  -Urine lytes pending-calculate urine gap  -Repeat BMP at 1400  -Daily BMP    Asthma  -controlled    F/E/N  -IVFs-stop once taking a diet  -start diabetic diet  -replete electrolytes as needed    Proph  -SCD  -Lovenox    Dispo  -Full code  -ICU for risk of decompensation, possible transfer to floor later today off insulin gtt    Plan of care discussed with intensivist on-call, Dr Marcy Melo, [facility]  ICU  Phone  [phone]   Pager [phone]      ICU attending rachele    DKA resolved, with PATRIC currently bicarb is 17, getting a follow up at 2p. On home dose of treisba and off insulin drip after 3 hours. Will try and transfer to floor after 2 pm BMP, and dc home tomorrow or tonight. Will dw attending to put orders for am discharge before 11 am assuming good glucose control.    Critical Care Attending  George Vidal    DOS 10/30/24    35 minutes of critical care time spent in management of this critically ill patient.

## 2024-10-30 NOTE — CM/SW NOTE
10/30/24 1000   CM/SW Referral Data   Referral Source    Reason for Referral Discharge planning   Informant Patient     Met with pt to discuss discharge needs. Pt stating he has some difficulty paying for all his insulin. Encouraged pt to follow up with endocrinologist with in the next week.     Spoke with Rut ELMORE who has coupons for insulin and has made a follow up appt for patient.     Pt does work FT and has no additional needs.     Ovidio Hatch, TRUDY RN, CM  [phone]

## 2024-10-31 NOTE — DISCHARGE SUMMARY
[facility] HOSPITALIST  DISCHARGE SUMMARY     Yusuf Alberto Patient Status:  Inpatient    2000 MRN [id number]   Location [facility] 4SW-A Attending No att. providers found   Hosp Day # 1 PCP Chiki Hernandez MD     Date of Admission: 10/29/2024  Date of Discharge:  10/30/2024     Discharge Disposition: Home or Self Care    Discharge Diagnosis:  Type I DKA  Metabolic acidosis   Asthma  Hypophosphatemia    History of Present Illness:   24 year old male with about 36 hours of palpitations, high sugars, nausea, as well as bout of emesis yesterday and two loose stools.  Has been compliant with insulin but started 70/30 a few weeks ago and has had much worse control of sugars since that time; ordinarily take 10 units short acting insulin with meals and 25 units long acting every day but hasn't been able to afford that so has been on 70/30.  Denies cough, fevers, chills, dysuria.  Feels exertionally dyspneic however.     Brief Synopsis:   #type 1 DKA, likely due to in adequate dosing insulin last few weeks (usually takes long and short acting but has been on 70/30 due to cost issues); IV insulin to subcutaneous insulin. Patient was given $35/month insulin program; enrolled by DM RAMÍREZ. [facility] follow up.     Lace+ Score: 58  59-90 High Risk  29-58 Medium Risk  0-28   Low Risk       TCM Follow-Up Recommendation:  LACE > 58: High Risk of readmission after discharge from the hospital.      Procedures during hospitalization:   NA    Incidental or significant findings and recommendations (brief descriptions):  NA    Lab/Test results pending at Discharge:   NA    Consultants:  Diabetes APN, critical care    Discharge Medication List:     Discharge Medications        START taking these medications        Instructions Prescription details   insulin aspart 100 Units/mL Sopn  Commonly known as: NovoLOG      1 U for every 40 mg/dL above 140 mg/dL for maximum 20 U daily   Quantity: 1 each  Refills: 0     insulin aspart 100  Units/mL Sopn  Commonly known as: NovoLOG      1 U for every 10 grams of carbs for maximum of 15 U daily   Quantity: 1 each  Refills: 0     OneTouch Delica Lancets 33G Misc      Test blood sugar 3 times per day.   Quantity: 100 each  Refills: 6     OneTouch Verio Flex System w/Device Kit      Test blood sugar 3 times per day.   Quantity: 1 kit  Refills: 0     OneTouch Verio Strp      Test blood sugar 3 times per day.   Quantity: 100 strip  Refills: 6            CONTINUE taking these medications        Instructions Prescription details   Dexcom G6 Transmitter Misc      1 each every 3 (three) months.   Quantity: 1 each  Refills: 3     Dexcom G7 Sensor Misc      1 each Every 10 days.   Quantity: 9 each  Refills: 3     Dexcom G6 Sensor Misc      1 each Every 10 days. Use as directed every 10 days   Quantity: 3 each  Refills: 11     Gvoke HypoPen 2-Pack 1 MG/0.2ML injection  Generic drug: glucagon      Inject 0.2 mL (1 mg total) into the skin as needed.   Quantity: 0.4 mL  Refills: 1     Pen Needles 32G X 4 MM Misc      5 each daily.   Quantity: 500 each  Refills: 1     Tresiba FlexTouch 200 UNIT/ML Sopn  Generic drug: insulin degludec      Inject 25 Units into the skin daily.   Refills: 0            STOP taking these medications      Insulin Lispro (1 Unit Dial) 100 UNIT/ML Sopn  Commonly known as: HumaLOG KwikPen                  Where to Get Your Medications        These medications were sent to [id number] - [place], [place] - [facility] [address] [phone], [phone]  [address] [address]      Phone: [phone]   insulin aspart 100 Units/mL Sopn  insulin aspart 100 Units/mL Sopn  OneTouch Delica Lancets 33G Misc  OneTouch Verio Flex System w/Device Kit  OneTouch Verio Strp         [place]    Follow-up appointment:   Cihki Hernandez MD  [address] [place] [address] [phone]    Call in 1 week(s)  Call to make hospital follow-up appointment    [facility]  [address]  [facility] [address] [phone]  Schedule an appointment as soon as possible for a visit in 2 day(s)  Call to make hospital follow-up appointment    Phyllis Gutierrez APRN  [address]  [facility] [address]  [phone]    Go on 2024  Go to appointment with endocrinology on  at 1PM    Appointments for Next 30 Days 10/30/2024 - 2024        Date Arrival Time Visit Type Length Department Provider     2024  1:00 PM  EXAM - NEW PATIENT [2845] 45 min [facility] Phyllis Gutierrez APRN    Patient Instructions:         Location Instructions:     Masks are optional for all patients and visitors, unless otherwise indicated.                      Vital signs:  Temp:  [97.6 °F (36.4 °C)-99.4 °F (37.4 °C)] 98 °F (36.7 °C)  Pulse:  [70-96] 86  Resp:  [13-28] 18  BP: ()/(52-82) 106/71  SpO2:  [93 %-98 %] 98 %    Physical Exam:    General: No acute distress   Lungs: clear to auscultation  Cardiovascular: S1, S2  Abdomen: Soft    -----------------------------------------------------------------------------------------------  PATIENT DISCHARGE INSTRUCTIONS: See electronic chart    Sallie Vazquez MD    Total time spent on discharge plannin minutes     The  Century Cures Act makes medical notes like these available to patients in the interest of transparency. Please be advised this is a medical document. Medical documents are intended to carry relevant information, facts as evident, and the clinical opinion of the practitioner. The medical note is intended as peer to peer communication and may appear blunt or direct. It is written in medical language and may contain abbreviations or verbiage that are unfamiliar.

## 2024-11-04 ENCOUNTER — PATIENT OUTREACH (OUTPATIENT)
Dept: CASE MANAGEMENT | Age: 24
End: 2024-11-04

## 2024-11-04 NOTE — PAYOR COMM NOTE
PLEASE FAX DETERMINATION OF DAYS [phone]    --------------  DISCHARGE REVIEW    Payor: [facility] OUT OF STATE PPO  Subscriber #:  [id number]  Authorization Number: [id number]    Admit date: 10/29/24  Admit time:  12:54 PM  Discharge Date: 10/30/2024  5:15 PM     Admitting Physician: Soham Hein MD  Attending Physician:  No att. providers found  Primary Care Physician: Chiki Hernandez MD          Discharge Summary Notes        Discharge Summary signed by Sallie Vazquez MD at 10/30/2024  8:35 PM       Author: Sallie Vazquez MD Specialty: HOSPITALIST, Internal Medicine Author Type: Physician    Filed: 10/30/2024  8:35 PM Date of Service: 10/30/2024  8:27 PM Status: Signed    : Sallie Vazquez MD (Physician)           [facility]     Yusuf Alberto Patient Status:  Inpatient    2000 MRN [id number]   Location [facility] 4SW-A Attending No att. providers found   Hosp Day # 1 PCP Chiki Hernandez MD     Date of Admission: 10/29/2024  Date of Discharge:  10/30/2024     Discharge Disposition: Home or Self Care    Discharge Diagnosis:  Type I DKA  Metabolic acidosis   Asthma  Hypophosphatemia    History of Present Illness:   24 year old male with about 36 hours of palpitations, high sugars, nausea, as well as bout of emesis yesterday and two loose stools.  Has been compliant with insulin but started 70/30 a few weeks ago and has had much worse control of sugars since that time; ordinarily take 10 units short acting insulin with meals and 25 units long acting every day but hasn't been able to afford that so has been on 70/30.  Denies cough, fevers, chills, dysuria.  Feels exertionally dyspneic however.     Brief Synopsis:   #type 1 DKA, likely due to in adequate dosing insulin last few weeks (usually takes long and short acting but has been on 70/30 due to cost issues); IV insulin to subcutaneous insulin. Patient was given $35/month insulin program; enrolled by MARIMAR Villar  endo follow up.     Lace+ Score: 58  59-90 High Risk  29-58 Medium Risk  0-28   Low Risk       TCM Follow-Up Recommendation:  LACE > 58: High Risk of readmission after discharge from the hospital.      Procedures during hospitalization:   NA    Incidental or significant findings and recommendations (brief descriptions):  NA    Lab/Test results pending at Discharge:   NA    Consultants:  Diabetes APN, critical care    Discharge Medication List:     Discharge Medications        START taking these medications        Instructions Prescription details   insulin aspart 100 Units/mL Sopn  Commonly known as: NovoLOG      1 U for every 40 mg/dL above 140 mg/dL for maximum 20 U daily   Quantity: 1 each  Refills: 0     insulin aspart 100 Units/mL Sopn  Commonly known as: NovoLOG      1 U for every 10 grams of carbs for maximum of 15 U daily   Quantity: 1 each  Refills: 0     OneTouch Delica Lancets 33G Misc      Test blood sugar 3 times per day.   Quantity: 100 each  Refills: 6     OneTouch Verio Flex System w/Device Kit      Test blood sugar 3 times per day.   Quantity: 1 kit  Refills: 0     OneTouch Verio Strp      Test blood sugar 3 times per day.   Quantity: 100 strip  Refills: 6            CONTINUE taking these medications        Instructions Prescription details   Dexcom G6 Transmitter Misc      1 each every 3 (three) months.   Quantity: 1 each  Refills: 3     Dexcom G7 Sensor Misc      1 each Every 10 days.   Quantity: 9 each  Refills: 3     Dexcom G6 Sensor Misc      1 each Every 10 days. Use as directed every 10 days   Quantity: 3 each  Refills: 11     Gvoke HypoPen 2-Pack 1 MG/0.2ML injection  Generic drug: glucagon      Inject 0.2 mL (1 mg total) into the skin as needed.   Quantity: 0.4 mL  Refills: 1     Pen Needles 32G X 4 MM Misc      5 each daily.   Quantity: 500 each  Refills: 1     Tresiba FlexTouch 200 UNIT/ML Sopn  Generic drug: insulin degludec      Inject 25 Units into the skin daily.   Refills: 0             STOP taking these medications      Insulin Lispro (1 Unit Dial) 100 UNIT/ML Sopn  Commonly known as: HumaLOG KwikPen                  Where to Get Your Medications        These medications were sent to [id number] - [place], [place] - [address] [phone], [phone]  [address], [facility] [address]      Phone: [phone]   insulin aspart 100 Units/mL Sopn  insulin aspart 100 Units/mL Sopn  OneTouch Delica Lancets 33G Misc  OneTouch Verio Flex System w/Device Kit  OneTouch Verio Strp         [place]    Follow-up appointment:   Chiki Hernandez MD  [address] [facility] [address]  [facility] [address] [phone]    Call in 1 week(s)  Call to make hospital follow-up appointment    [facility]  [address]  [facility] [address] [phone]  Schedule an appointment as soon as possible for a visit in 2 day(s)  Call to make hospital follow-up appointment    Phyllis Gutierrez APRN  [address]  [facility] [address] [phone]    Go on 11/7/2024  Go to appointment with endocrinology on 11/7 at 1PM    Appointments for Next 30 Days 10/30/2024 - 11/29/2024        Date Arrival Time Visit Type Length Department Provider     11/7/2024  1:00 PM  EXAM - NEW PATIENT [2845] 45 min [facility] Phyllis Gutierrez APRN    Patient Instructions:         Location Instructions:     Masks are optional for all patients and visitors, unless otherwise indicated.                      Vital signs:  Temp:  [97.6 °F (36.4 °C)-99.4 °F (37.4 °C)] 98 °F (36.7 °C)  Pulse:  [70-96] 86  Resp:  [13-28] 18  BP: ()/(52-82) 106/71  SpO2:  [93 %-98 %] 98 %    Physical Exam:    General: No acute distress   Lungs: clear to auscultation  Cardiovascular: S1, S2  Abdomen: Soft    -----------------------------------------------------------------------------------------------  PATIENT DISCHARGE INSTRUCTIONS: See electronic chart    Sallie Vazquez,  MD    Total time spent on discharge plannin minutes     The 21st Century Cures Act makes medical notes like these available to patients in the interest of transparency. Please be advised this is a medical document. Medical documents are intended to carry relevant information, facts as evident, and the clinical opinion of the practitioner. The medical note is intended as peer to peer communication and may appear blunt or direct. It is written in medical language and may contain abbreviations or verbiage that are unfamiliar.       Electronically signed by Sallie Vazquez MD on 10/30/2024  8:35 PM         REVIEWER COMMENTS

## 2024-11-04 NOTE — PROGRESS NOTES
TCM request     Hospital Follow up for PCP  (Discharge 10/30 edw )         PCP   Chiki Hernandez MD   [phone]       Attempt #1:  unable to leave vm; mail box full

## 2024-11-05 NOTE — PROGRESS NOTES
TCM request     Hospital Follow up for PCP  (Discharge 10/30 edw )         PCP   Chiki Hernandez MD   [phone]       Attempt #2:  unable to leave ; call picked up and hung up

## 2024-11-06 NOTE — PROGRESS NOTES
TCM request      Hospital Follow up for PCP  (Discharge 10/30 edw )      PCP   Chiki Hernandez MD   [address] [place] [address] [phone]   Apt made:  Thu 11/21 @10:30am w/AMAYA Zambrano  Confirmed w/pt  Closing encounter

## 2024-11-07 ENCOUNTER — TELEPHONE (OUTPATIENT)
Facility: CLINIC | Age: 24
End: 2024-11-07

## 2024-11-07 ENCOUNTER — OFFICE VISIT (OUTPATIENT)
Facility: CLINIC | Age: 24
End: 2024-11-07
Payer: COMMERCIAL

## 2024-11-07 VITALS
SYSTOLIC BLOOD PRESSURE: 114 MMHG | DIASTOLIC BLOOD PRESSURE: 68 MMHG | BODY MASS INDEX: 23.05 KG/M2 | WEIGHT: 135 LBS | HEART RATE: 106 BPM | HEIGHT: 64 IN | OXYGEN SATURATION: 97 %

## 2024-11-07 DIAGNOSIS — E10.65 TYPE 1 DIABETES MELLITUS WITH HYPERGLYCEMIA, WITH LONG-TERM CURRENT USE OF INSULIN (HCC): Primary | ICD-10-CM

## 2024-11-07 DIAGNOSIS — E78.2 MIXED HYPERLIPIDEMIA: ICD-10-CM

## 2024-11-07 DIAGNOSIS — E55.9 VITAMIN D DEFICIENCY: ICD-10-CM

## 2024-11-07 PROCEDURE — 95251 CONT GLUC MNTR ANALYSIS I&R: CPT | Performed by: NURSE PRACTITIONER

## 2024-11-07 PROCEDURE — 3078F DIAST BP <80 MM HG: CPT | Performed by: NURSE PRACTITIONER

## 2024-11-07 PROCEDURE — 99215 OFFICE O/P EST HI 40 MIN: CPT | Performed by: NURSE PRACTITIONER

## 2024-11-07 PROCEDURE — 3046F HEMOGLOBIN A1C LEVEL >9.0%: CPT | Performed by: NURSE PRACTITIONER

## 2024-11-07 PROCEDURE — G2211 COMPLEX E/M VISIT ADD ON: HCPCS | Performed by: NURSE PRACTITIONER

## 2024-11-07 PROCEDURE — 3074F SYST BP LT 130 MM HG: CPT | Performed by: NURSE PRACTITIONER

## 2024-11-07 PROCEDURE — 3008F BODY MASS INDEX DOCD: CPT | Performed by: NURSE PRACTITIONER

## 2024-11-07 RX ORDER — INSULIN DEGLUDEC 200 U/ML
INJECTION, SOLUTION SUBCUTANEOUS
Qty: 30 ML | Refills: 1 | Status: SHIPPED | OUTPATIENT
Start: 2024-11-07

## 2024-11-07 RX ORDER — ACYCLOVIR 400 MG/1
1 TABLET ORAL
Qty: 9 EACH | Refills: 1 | Status: SHIPPED | OUTPATIENT
Start: 2024-11-07

## 2024-11-07 NOTE — TELEPHONE ENCOUNTER
Received PA request via fax for [employer].    Initiated PA through [facility], waiting on clinical notes to be signed by APN to submit finalized PA    Key: [id number]  PA Case ID #: [id number]  Rx #: [id number]

## 2024-11-07 NOTE — PATIENT INSTRUCTIONS
Return Visit:  APN: Return in about 6 weeks (around 12/19/2024) for diabetes follow up.  [] Video visit  [x] In person only      [x]  Diabetes Education: in 1-2 weeks     Diet/Lifestyle:   [] Carb counting 101   [] Carb Aware (T2DM nutrition counseling)   [] Carb Aware (T2DM nutrition counseling) + BG check in   [] Insulin dose fine tuning (If not yet carb counting, start with carb count 101)  [] DM burnout counseling    Medication/devices:   [] Pump settings check in after changes made today   [] Starting insulin pump ( 90 mins)   [x] Pump 101 ( learning about pumps and carb counting)   [] BG check in   [] Ordered CGM today- Patient will need to call the office to schedule CGM training once they receive their device. Call Office phone number: [phone] to schedule      [x]  Directions to 1st floor lab    []  Give blood sugar log  []  PAP paperwork for Ozempic/Jardiance (english/Slovenian)     Summary of today's visit:  Medication changes:    Insulin: Increase Tresiba from 25 to 28  Increase Novolog from 1:10 to 1:8 carb ratio + sliding scale of 1:30>140 with lunch and dinner.   <140:  0 units  141-170- 1 units  171-200- 2 units  201-230-3 units  231-260- 4 units  261-290-5 units  291-320- 6 units   321-350-7 units    - Schedule eye exam prior to your follow up visit  Ophthalmologist for diabetes eye exam:  [facility]: Dr Olga Jenkins- [address], [place] [address]- PHONE: [phone]  [facility]: [phone]  [place]: Dr. Oppenheim- [phone]  [place]: [facility]: Phone: [phone], Address: [address] [place] [address], [place] [address]  [facility](accepts MEDICAID)- [phone], [facility]- [phone]- [place], [employer], or [employer]  [place]: [facility] - [phone]  [place]: Cristhian Nino- [employer]- they can do screenings using their machine  [place]: [facility]-  [phone]   [place]: Denita Franco MD -  [phone]    - If on insulin, please ensure you have glucagon at home for emergencies   - calibrate your cesar when arrow is steady. Check glucose with fingerstick if glucose is below 70 in dexcom and you are not having any symptoms.     Additional comments:   - If labs were ordered today, please complete prior to your follow up visit   - Please let us know if you require any refills at least 1 week prior to your medication running out   - Please call our office if sugars at home are consistently greater than 250 or less than 70     HOW TO TREAT LOW BLOOD SUGAR (Hypoglycemia)  Low blood sugar= Less than 70, or if you start to have symptoms (below)  Symptoms: Shaking or trembling, fast heart rate, extreme hunger, sweating, confusion/difficulty concentrating, dizziness.    How to treat a low blood sugar if you are able to eat/drink: The Rule of 15/15  If you are using continuous glucose monitor that says you are low, but you do not have any symptoms, verify on fingerstick that your blood sugar is actually low before treating.   Eat 15 grams of carbs (see examples below)  Check your blood sugar after 15 minutes. If it’s still below your target range, have another serving.   Repeat these steps until it’s in your target range. Once it’s in range, if you're nervous about your sugar going low again, have a protein source (ie, a spoonful of peanut butter).   If you have a CGM you want to look for how your arrow has changed. If you arrow is pointed up or sideways after 15 min, give your CGM more time OR check with a finger stick. Try not to eat more food until at least 15 min after the first BG check - otherwise you risk having a rebound high.  If you are experiencing symptoms and you are unable to check your blood glucose for any reason, treat the hypoglycemia.  If someone has a low blood sugar and is unconscious: Don’t hesitate to call 911. If someone is unconscious and  glucagon is not available or someone does not know how to use it, call 911 immediately.     To treat a low, I recommend you carry with you easy, pre-portioned treatment for low blood sugars that are 15G of carbs:   - Children sized squeeze pouch applesauce (high fiber + carbs help prevent too high of a spike)  - Small children's sized juicebox- 15g carb --> 4oz juice box  - Glucose tablets from [employer], you can find them near diabetes supplies --> Note, you will need to eat 3-4 tablets to get to 15g of carbs  - Children sized fruit snack pack- look for one with 15 grams of total carbohydrate  - Choice of how to treat your low is important. Complex carbs, or foods that contain fats along with carbs (like chocolate) can slow the absorption of glucose and should NOT be used to treat an emergency low      Diabetic Ketoacidosis  Diabetic ketoacidosis (DKA) is a serious problem that can happen in people with diabetes. DKA should be treated as a medical emergency. This is because it can lead to coma or death. If you have the symptoms of DKA, get medical help right away.  DKA happens more often in people with type 1 diabetes. It can also happen to people with type 2 diabetes who are taking the SLGT-2 inhibitors for diabetes control. It also can happen in women with diabetes during pregnancy (gestational diabetes).  DKA happens when insulin levels are too low. Without enough insulin, sugar (glucose) can’t get to the cells of your body. The glucose stays in the blood. The liver then puts out even more glucose into the blood. This causes high blood glucose (hyperglycemia). Your body breaks down stored fat, when your cells don't get the glucose they need for energy. When this happens, acids called ketones are released into the blood. This is called ketosis. High levels of ketones (ketoacidosis) can be harmful to you. Hyperglycemia and ketoacidosis can also cause serious problems in the blood and your body, such as:  Low  levels of potassium and phosphate  Damage to kidneys or other organs  Coma  Dehydration  What causes diabetic ketoacidosis?  In people with diabetes, DKA is most often caused by too little insulin in the body. It's also caused by:  Diabetes that's not under good control  Infections such as a urinary tract infection or pneumonia  Serious health problems such as a heart attack  Reactions to certain prescribed medicines used to treat type 2 diabetes  Reactions to illegal drugs including cocaine  Problems with insulin delivery from an insulin pump  Symptoms of diabetic ketoacidosis  DKA most often happens slowly over time. But it can worsen in a few hours if you are vomiting and dehydrated. The first symptoms are:  Thirst and dry mouth  Urinating a lot  Belly pain  Nausea or vomiting  Breath that smells fruity (from the ketones)  Over time, these symptoms may happen or get worse:  Dry or flushed skin  Nausea and vomiting  Loss of appetite  Weight loss  Belly pain  Trouble breathing or breathing that is deep and rapid  Trouble thinking or confusion  Feeling very tired or weak. This can lead to coma.  How is diabetic ketoacidosis diagnosed?  Your healthcare provider will ask about your health history. They will give you a physical exam. You may also have these tests:  Blood tests to check your glucose levels and ketones  Blood tests to check your electrolytes, such as potassium, sodium, and bicarbonate  Urine test to check for ketones  Electrocardiogram (ECG)  These tests are done to check for DKA, and watch it over time.  How is diabetic ketoacidosis treated?  DKA needs treatment right away in the hospital. Treatment includes:  Insulin. This is the main type of treatment. Insulin allows the cells to use the glucose in the blood. This lowers the levels of both blood glucose and ketones.  Fluids and electrolytes. These are given by IV (intravenous) line into a vein. Fluids are replaced and abnormal electrolyte levels are  corrected.  Other medicines. These may be given to treat an illness that caused DKA. For example, antibiotics may be given to treat a urinary tract infection that caused DKA.    If there are moderate to large ketones, push 8 ounce of water or SUGAR/Carbohydrates FREE Fluids every hour ( if no history of Heart failure). Continue checking your blood sugar trends every 1- 2 hours.   If at any time, you develop abdominal pain, nausea or vomiting AND have ketones, go to the emergency department to be sure you are not in diabetic ketoacidosis (DKA)  Preventing diabetic ketoacidosis  To help prevent DKA, make sure you:  Take all of your medicines for diabetes exactly as prescribed. This includes insulin.  Check your blood glucose levels exactly as instructed.  Be very careful when you are sick with an illness or an infection. Take extra care to follow diabetes care instructions for sick days. Check your blood glucose more often. Contact your healthcare provider if you have questions about how to manage your diabetes while you are ill.  Don't exercise when your blood sugar is high and you have ketones in your urine.   Check your urine ketone levels if told to do so. This is done with a urine test strip. Ask your provider how often to check your urine.  When to call your healthcare provider  Call your provider right away if you:  Have symptoms of DKA  Have very high blood glucose levels or high levels of ketones in your urine  Are getting sick with another illness  Are confused about how to manage your diabetes  [employer] last reviewed this educational content on 3/1/2022        Discharge Instructions: Checking for Ketones   Glucose is a kind of sugar from food. It's your body's normal energy source. Your body uses a hormone called insulin to help glucose get into your cells. People with diabetes don't make insulin. Or their body can't use insulin well.   If your body doesn't have enough insulin to use glucose, your body  starts burning fat instead. When fat is burned, it makes chemicals called ketones. Ketones can build up in the blood and urine. This buildup can cause a dangerous condition called ketoacidosis. Diabetic ketoacidosis (DKA) can lead to diabetic coma or death. For this reason, you need to check for ketones at times when you are most at risk.    When to check for ketones   Check for ketones, especially if you have type 1 diabetes. Do this when any of the following is true, or as directed by your healthcare provider:   Your blood sugar is above  240 mg/dL.  You are ill or under stress.  You have diarrhea or stomach pain.  You are very thirsty.  You need to urinate often.  You have a dry mouth.  Your breath smells \"fruity.\"   You feel sick or nauseated. Or you have vomited and are becoming dehydrated.  You have run out of your usual diabetes medicines and can't get them right away. This is especially true if you use insulin.   It's also important to check for ketones if you have type 2 diabetes and are taking a certain type of medicine (SGLT-2 inhibitors).   How to check for ketones   Tips for checking for ketones:    Use testing tablets or strips. Different test kits are available from your pharmacy.  Depending on the kit, you can check for ketones in your urine or in your blood. Follow the directions on the packaging.  Record your test results. Show them to your healthcare provider at your next visit.  Call your healthcare provider right away if you test positive for ketones. Follow the directions in your sick day guidelines on what to do when you have high glucose and ketones.   Do not exercise if you find ketones in your blood or urine and your blood sugar is high.  Urine tests      Urine tests are one way of checking ketones.     Tips for using urine tests:    Follow the package directions carefully.  Use a clean container to get a sample of your urine. You can clean a container by washing it with soap and  water.  Place the test strip in the urine sample. Or pass the strip through your urine stream.  Gently shake extra urine off the strip.  Wait for the strip to change color. The directions will tell you how long to wait.  Compare the strip with the color chart on the bottle or package. This gives you a range for the number of ketones in your urine.  Record your results.  Blood tests   Tips for testing your blood:    Use the meter and blood ketone strips as directed by your healthcare provider  Record your results.  To learn more   The resources below can help you learn more:   American Diabetes Association at www.diabetes.org  or 284-151-7646  [facility] at www.hormone.org  or 263-733-3244  Follow-up  Make a follow-up appointment, or as advised.    When to call your healthcare provider   Call your healthcare provider right away or go to the nearest emergency room if any of the following occur:   Moderate to large amounts of ketones, or as advised by your healthcare provider  Fever of 100.4°F (38°C) or higher, or as advised by your healthcare provider  Tiredness (fatigue)  Dry or flushed skin  Nausea or vomiting  Stomach pain  Difficulty breathing  A sweet, fruity odor on your breath  Confusion  [employer] last reviewed this educational content on 7/1/2022  © 8830-0033 [employer]. All rights reserved. This information is not intended as a substitute for professional medical care. Always follow your healthcare professional's instructions.

## 2024-11-07 NOTE — PROGRESS NOTES
[facility] Note    Name: Yusuf Alberto    Date: 11/7/2024    Yusuf Alberto is a 24 year old male who presents for evaluation of T1DM management.     Chief complaint: New Patient (NP here to establish care for DM1, per pt no current concerns or sx./Pt checks BG via CGM Dexcom G7 for couple of days prior to that using fingerstick /Last A1c value was 14.4% done 10/29/2024. /Last Diabetic Eye Exam-Never done/Last Diabetic Foot Exam-1/08/24)       Subjective:   DM hx:  -Diagnosed with diabetes in 2015  -Started insuln: in 2015  -Family history- yes, MGM- Type 2  Initial HPI consult - 11/7/2024  Last office visit for DM mgmt of the pt: 2/15/24  Mgmt by:  RAMÍREZ Fleming  Patient was recently discharged from the hospital last 10/30/24 due to DKA ( 2nd time) , asthma and hypopotassemia   Patient states that over the last 2 months he has been unable to afford his insulin (states each box is ~$100/month) and CGMs. He states his PCP has switched him to 70/30 insulin d/t to inexpensive cost, but that he often does not remember to administer it twice daily, states he used it for about 2 weeks prior hospitalization.   DM meds at first office visit:  Tresiba = 25u every evening   Novolog = 1:40>140 & 1:10gm CHO- patient reported 14-16 units twice daily (not eating bfast)  Original plan from outpt Last office visit: Tresiba U 200 flex touch: 25 units once daily   Humalog Kwik pen: Off day: ICR 10 , ISF 40   episodes of hypoglycemia: Yes    Continuous Glucose Monitoring Interpretation  Yusuf Alberto has undergone continuous glucose monitoring with the Dexcom G7 CGM with phone (connected to clinic profile).  The blood glucose tracings were evaluated for two weeks prior to office visit. Blood glucose tracings demonstrated areas of hyperglycemia throughout the entire day, no specific pattern. There were occasional hypoglycemia, particularly after 1 am during the weeks of evaluation.    Date: 10/25/24: TIR 34%, GMI NA% ,   low 0% , very low 0%, SD 77mg/dl, Sensor usage: 37%        -Re: potential DM medication contraindication (if positive, checkbox selected):  [] History of pancreatitis- denies   [] Personal/fam hx of medullary thyroid cancer/MEN2- denies   [] History of recent/frequent UTI/yeast infxn- denies   [] Previous amputation related to diabetes- denies     -Presence of associated DM complications (if positive, checkbox selected):  [] Macrovascular complications (CAD/CVA/PAD)  [] Neuropathy  [] Retinopathy- denies   [] Nephropathy- denies   [] HTN- denies   [x] Hyperlipidemia  [] Stroke/TIA- denies   [] Gastroparesis- denies   [] Wound, ulcer or amputations- denies     - Lifestyle: diet , not cooking a lot from home, eating outside or soup from the can   - Modifying factors: works full time at [employer] (walks from home to [employer] and his house)  - Steroid use: No      Previously trialed/failed DM meds: none     History/Other:    Allergies, PMH, SocHx and FHx reviewed and updated as appropriate in Epic on    Continuous Glucose Sensor (DEXCOM G7 SENSOR) Does not apply Misc 1 each Every 10 days. 9 each 1    Urine Glucose-Ketones Test In Vitro Strip Use as needed, with ANY episode of VOMITING, during illness, and/or if 2+ BG >250 despite usual correction. 100 strip 0    TRESIBA FLEXTOUCH 200 UNIT/ML Subcutaneous Solution Pen-injector Inject 28 units every evening  (For insurance purposes only: TDD max: 34 units per day) 30 mL 1    insulin aspart 100 Units/mL Subcutaneous Solution Pen-injector 1 U for every 40 mg/dL above 140 mg/dL for maximum 20 U daily 1 each 0    insulin aspart 100 Units/mL Subcutaneous Solution Pen-injector 1 U for every 10 grams of carbs for maximum of 15 U daily 1 each 0    Blood Glucose Monitoring Suppl (ONETOUCH VERIO FLEX SYSTEM) w/Device Does not apply Kit Test blood sugar 3 times per day. 1 kit 0    Glucose Blood (ONETOUCH VERIO) In Vitro Strip Test blood sugar 3 times per day. 100 strip 6    OneTouch Delica  Lancets 33G Does not apply Misc Test blood sugar 3 times per day. 100 each 6    Continuous Blood Gluc Transmit (DEXCOM G6 TRANSMITTER) Does not apply Misc 1 each every 3 (three) months. 1 each 3    Continuous Blood Gluc Sensor (DEXCOM G6 SENSOR) Does not apply Misc 1 each Every 10 days. Use as directed every 10 days 3 each 11    Continuous Blood Gluc Sensor (DEXCOM G7 SENSOR) Does not apply Misc 1 each Every 10 days. 9 each 3    glucagon (GVOKE HYPOPEN 2-PACK) 1 MG/0.2ML Subcutaneous SUBQ injection Inject 0.2 mL (1 mg total) into the skin as needed. 0.4 mL 1    Insulin Pen Needle (PEN NEEDLES) 32G X 4 MM Does not apply Misc 5 each daily. 500 each 1     Allergies[1]  Current Outpatient Medications   Medication Sig Dispense Refill    Continuous Glucose Sensor (DEXCOM G7 SENSOR) Does not apply Misc 1 each Every 10 days. 9 each 1    Urine Glucose-Ketones Test In Vitro Strip Use as needed, with ANY episode of VOMITING, during illness, and/or if 2+ BG >250 despite usual correction. 100 strip 0    TRESIBA FLEXTOUCH 200 UNIT/ML Subcutaneous Solution Pen-injector Inject 28 units every evening  (For insurance purposes only: TDD max: 34 units per day) 30 mL 1    insulin aspart 100 Units/mL Subcutaneous Solution Pen-injector 1 U for every 40 mg/dL above 140 mg/dL for maximum 20 U daily 1 each 0    insulin aspart 100 Units/mL Subcutaneous Solution Pen-injector 1 U for every 10 grams of carbs for maximum of 15 U daily 1 each 0    Blood Glucose Monitoring Suppl (ONETOUCH VERIO FLEX SYSTEM) w/Device Does not apply Kit Test blood sugar 3 times per day. 1 kit 0    Glucose Blood (ONETOUCH VERIO) In Vitro Strip Test blood sugar 3 times per day. 100 strip 6    OneTouch Delica Lancets 33G Does not apply Misc Test blood sugar 3 times per day. 100 each 6    Continuous Blood Gluc Transmit (DEXCOM G6 TRANSMITTER) Does not apply Misc 1 each every 3 (three) months. 1 each 3    Continuous Blood Gluc Sensor (DEXCOM G6 SENSOR) Does not apply Misc  1 each Every 10 days. Use as directed every 10 days 3 each 11    Continuous Blood Gluc Sensor (DEXCOM G7 SENSOR) Does not apply Misc 1 each Every 10 days. 9 each 3    glucagon (GVOKE HYPOPEN 2-PACK) 1 MG/0.2ML Subcutaneous SUBQ injection Inject 0.2 mL (1 mg total) into the skin as needed. 0.4 mL 1    Insulin Pen Needle (PEN NEEDLES) 32G X 4 MM Does not apply Misc 5 each daily. 500 each 1     Past Medical History:    Asthma (HCC)    Diabetes mellitus (HCC)    Extrinsic asthma, unspecified    Seasonal allergies    Type 1 diabetes mellitus (HCC)     Family History   Problem Relation Age of Onset    Hypertension Mother     Hypertension Maternal Grandmother     Diabetes Maternal Grandmother     Cancer Maternal Grandmother     Lipids Maternal Grandmother     Stroke Maternal Grandmother     Other (atrial flutter) Maternal Grandmother     Hypertension Maternal Grandfather     Lipids Maternal Grandfather     Cancer Maternal Grandfather      Social history: Reviewed.      ROS/Exam    REVIEW OF SYSTEMS: Ten point review of systems has been performed and is otherwise negative and/or non-contributory, except as described above.     VITALS  Vitals:    11/07/24 1311   BP: 114/68   Pulse: 106   SpO2: 97%   Weight: 135 lb (61.2 kg)   Height: 5' 4\" (1.626 m)       Wt Readings from Last 6 Encounters:   11/07/24 135 lb (61.2 kg)   10/30/24 128 lb 1.4 oz (58.1 kg)   07/11/24 155 lb (70.3 kg)   05/06/24 154 lb 6.4 oz (70 kg)   01/08/24 137 lb 12.8 oz (62.5 kg)   12/31/23 123 lb 7.3 oz (56 kg)       PHYSICAL EXAM  CONSTITUTIONAL:  awake, alert, cooperative, no apparent distress, and appears stated age Vss stable   PSYCH: normal affect  LUNGS: breathing comfortably  CARDIOVASCULAR:  regular rate   NECK:  no palpable thyroid nodules     Labs/Imaging:   Lab Results   Component Value Date    CHOLEST 266 (H) 03/02/2023    CHOLEST 195.00 (H) 09/06/2019    TRIG 176 (H) 03/02/2023    TRIG 124.00 (H) 09/06/2019    HDL 51 03/02/2023    HDL 42 (L)  09/06/2019     (H) 03/02/2023     (H) 09/06/2019     Lab Results   Component Value Date    MICROALBCREA 9.7 01/08/2024    MICROALBCREA  03/02/2023      Comment:      Unable to calculate due to Urine Microalbumin <0.5 mg/dL        Lab Results   Component Value Date    CREATSERUM 1.12 10/30/2024    CREATSERUM 1.00 10/30/2024    EGFRCR 93 10/30/2024    EGFRCR 106 10/30/2024     Lab Results   Component Value Date    AST 13 10/29/2024    AST 13 (L) 12/28/2023    ALT 12 10/29/2024    ALT 19 12/28/2023       Lab Results   Component Value Date    TSH 3.180 03/02/2023    TSH 1.490 04/02/2015    T4F 1.3 04/02/2015     .    Overall glucose control:  Lab Results   Component Value Date    A1C 14.4 (H) 10/29/2024    A1C 10.5 (A) 05/06/2024    A1C 14.8 (H) 12/28/2023    A1C 16.1 (H) 03/02/2023    A1C >16.4 (H) 07/01/2020       Supplementary Documentation:   -Surveillance for Diabetes Complications & Risks  Foot exam/neuropathy: Last Foot Exam: 01/08/24    Retinopathy screening: No data recordedNo data recorded    Assessment & Plan:     ICD-10-CM    1. Type 1 diabetes mellitus with hyperglycemia, with long-term current use of insulin (HCC)  E10.65 TSH and Free T4     Continuous Glucose Sensor (DEXCOM G7 SENSOR) Does not apply Misc     Urine Glucose-Ketones Test In Vitro Strip     TRESIBA FLEXTOUCH 200 UNIT/ML Subcutaneous Solution Pen-injector      2. Mixed hyperlipidemia  E78.2 Lipid Panel      3. Vitamin D deficiency  E55.9 Vitamin D      4. BMI 23.0-23.9, adult  Z68.23           Yusuf Alberto is a pleasant 24 year old male here for evaluation of:    #Diabetes  PMHx of Type 2 diabetes mellitus diagnosed in 2015.  Last A1c value was 14.4% done 10/29/2024.Above goal  - Patient was recently discharged from the hospital due to DKA. Discussed insulin pump use and various models, patient interested of pursuing to use it, to see AYANA LaneE   - reviewed continous glucose monitor (CGM) Date: 10/25/24: TIR 34%, GMI NA% ,   low 0% , very low 0%, SD 77mg/dl, Sensor usage: 37%  Goal <7%. Importance of better glucose control in preventing onset/progression of end-organ damage discussed, as well as goals of therapy and clinical significance of A1C.   - check tfts and Vit D.   Plan:  - med changes:  - Insulin: Increase Tresiba from 25 to 28 every evening  Increase Novolog from 1:10 to 1:8 carb ratio + sliding scale of 1:30>140 with lunch and dinner.   <140:  0 units  141-170- 1 units  171-200- 2 units  201-230-3 units  231-260- 4 units  261-290-5 units  291-320- 6 units   321-350-7 units  - continue Dexcom G7 CGM with phone (connected to clinic profile)  - patient is on insulin, and has suboptimal glycemic control including wide glycemic swings, thus would benefit from CGM  - continue to check BG 3x/day using glucometer or CGM  - meet with endo DM educator re:Pump 101 - learning about pump  Recommended to patient to see ophthalmology, given the patient list of ophthalmology   - Discussed management of low glucose and targeted glucose levels and provided instructions in AVS   -See above header \"Supplementary Documentation\" for surveillance for diabetes complications & risks  - Discussed DKA symptoms and management and when to use the ketone strips if glucose persistent above 250, nausea, vomiting and abd pain , escript ketone strips      Nephropathy screening: checking [facility]  Lab Results   Component Value Date    EGFRCR 93 10/30/2024    MICROALBCREA 9.7 01/08/2024      Blood pressure control: - SBP is to goal <130   BP Readings from Last 1 Encounters:   11/07/24 114/68   BP Meds:      #Hyperlipidemia  Lipids: LDL is not to goal , repeating lipid, will further discuss on next visit, will defer statin use due to his age.   Lab Results   Component Value Date     (H) 03/02/2023    TRIG 176 (H) 03/02/2023   Cholesterol Meds:      #Vitamin D def  - check level      #BMI 23  - discussed Balanced diet: carbohydrates, protein, healthy fats and  fiber in each meal. Exercise of at least 150 mins each week. Decrease your simple carbohydrates intake such as sweets: cookies, soda, candy, white rice, white pasta, syrups      Return in about 6 weeks (around 12/19/2024) for diabetes follow up.    Total time spent  45 minutes today on obtaining history, reviewing pertinent labs, evaluating patient, providing multiple treatment options, reinforcing diet/exercise and compliance, and completing documentation, of which greater than 50% was spent in face to face discussion with the patient  who demonstrated understanding and agreement with plan.     Thank you for allowing me to participate in the care of this patient.  Please feel free to contact me with any questions.    AMAYA Benavidez, [facility]  Endocrinology, Diabetes & Metabolism   11/07/24    In reviewing this note, please be advised that Dragon Voice Recognition software used to dictate the note may have made errors in recognizing some of the words or phrases.     Note to patient: The 21 Century Cures Act makes medical notes like these available to patients in the interest of transparency. However, be advised this is a medical document. It is intended as peer to peer communication. It is written in medical language and may contain abbreviations or verbiage that are unfamiliar. It may appear blunt or direct. Medical documents are intended to carry relevant information, facts as evident, and the clinical opinion of the practitioner.         [1]   Allergies  Allergen Reactions    Dust ASTHMA    Seasonal

## 2024-11-08 NOTE — TELEPHONE ENCOUNTER
PA for Dexcom G7 sensor finalized and submitted through [facility].    Will await determination.    Key: [id number]  PA Case ID #: [id number]  Rx #: [id number]

## 2024-11-21 ENCOUNTER — TELEPHONE (OUTPATIENT)
Dept: INTERNAL MEDICINE CLINIC | Facility: CLINIC | Age: 24
End: 2024-11-21

## 2024-11-21 NOTE — TELEPHONE ENCOUNTER
Patient was scheduled for an appointment for a hospital follow visit on 11/21/2024. A voicemail was left for the patient unable to send mychart patient is not active.    Appointment was a No Show      Letter mailed to home address on file.

## 2024-12-05 ENCOUNTER — NURSE ONLY (OUTPATIENT)
Facility: CLINIC | Age: 24
End: 2024-12-05
Payer: COMMERCIAL

## 2024-12-05 DIAGNOSIS — E10.65 TYPE 1 DIABETES MELLITUS WITH HYPERGLYCEMIA, WITH LONG-TERM CURRENT USE OF INSULIN (HCC): Primary | ICD-10-CM

## 2024-12-12 ENCOUNTER — OFFICE VISIT (OUTPATIENT)
Dept: INTERNAL MEDICINE CLINIC | Facility: CLINIC | Age: 24
End: 2024-12-12
Payer: COMMERCIAL

## 2024-12-12 ENCOUNTER — LAB ENCOUNTER (OUTPATIENT)
Dept: LAB | Age: 24
End: 2024-12-12
Attending: NURSE PRACTITIONER
Payer: COMMERCIAL

## 2024-12-12 VITALS
BODY MASS INDEX: 24.52 KG/M2 | OXYGEN SATURATION: 98 % | HEIGHT: 64 IN | SYSTOLIC BLOOD PRESSURE: 118 MMHG | HEART RATE: 105 BPM | WEIGHT: 143.63 LBS | DIASTOLIC BLOOD PRESSURE: 72 MMHG

## 2024-12-12 DIAGNOSIS — E83.39 HYPOPHOSPHATEMIA: ICD-10-CM

## 2024-12-12 DIAGNOSIS — Z87.09 HISTORY OF ASTHMA: ICD-10-CM

## 2024-12-12 DIAGNOSIS — E10.65 TYPE 1 DIABETES MELLITUS WITH HYPERGLYCEMIA, WITH LONG-TERM CURRENT USE OF INSULIN (HCC): ICD-10-CM

## 2024-12-12 DIAGNOSIS — E78.2 MIXED HYPERLIPIDEMIA: ICD-10-CM

## 2024-12-12 DIAGNOSIS — E55.9 VITAMIN D DEFICIENCY: ICD-10-CM

## 2024-12-12 DIAGNOSIS — E10.10 TYPE 1 DIABETES MELLITUS WITH KETOACIDOSIS WITHOUT COMA (HCC): Primary | ICD-10-CM

## 2024-12-12 DIAGNOSIS — E10.65 TYPE 1 DIABETES MELLITUS WITH HYPERGLYCEMIA (HCC): ICD-10-CM

## 2024-12-12 DIAGNOSIS — E87.1 HYPONATREMIA: ICD-10-CM

## 2024-12-12 LAB
ALBUMIN SERPL-MCNC: 4 G/DL (ref 3.2–4.8)
ALBUMIN/GLOB SERPL: 1.4 {RATIO} (ref 1–2)
ALP LIVER SERPL-CCNC: 69 U/L
ALT SERPL-CCNC: 13 U/L
ANION GAP SERPL CALC-SCNC: 9 MMOL/L (ref 0–18)
AST SERPL-CCNC: 16 U/L (ref ?–34)
BILIRUB SERPL-MCNC: 0.7 MG/DL (ref 0.3–1.2)
BUN BLD-MCNC: <5 MG/DL (ref 9–23)
CALCIUM BLD-MCNC: 9.3 MG/DL (ref 8.7–10.4)
CHLORIDE SERPL-SCNC: 102 MMOL/L (ref 98–112)
CHOLEST SERPL-MCNC: 246 MG/DL (ref ?–200)
CO2 SERPL-SCNC: 28 MMOL/L (ref 21–32)
CREAT BLD-MCNC: 0.94 MG/DL
EGFRCR SERPLBLD CKD-EPI 2021: 115 ML/MIN/1.73M2 (ref 60–?)
EST. AVERAGE GLUCOSE BLD GHB EST-MCNC: 321 MG/DL (ref 68–126)
FASTING PATIENT LIPID ANSWER: NO
FASTING STATUS PATIENT QL REPORTED: NO
GLOBULIN PLAS-MCNC: 2.9 G/DL (ref 2–3.5)
GLUCOSE BLD-MCNC: 250 MG/DL (ref 70–99)
HBA1C MFR BLD: 12.8 % (ref ?–5.7)
HDLC SERPL-MCNC: 41 MG/DL (ref 40–59)
LDLC SERPL CALC-MCNC: 173 MG/DL (ref ?–100)
NONHDLC SERPL-MCNC: 205 MG/DL (ref ?–130)
PHOSPHATE SERPL-MCNC: 1.4 MG/DL (ref 2.4–5.1)
POTASSIUM SERPL-SCNC: 3.5 MMOL/L (ref 3.5–5.1)
PROT SERPL-MCNC: 6.9 G/DL (ref 5.7–8.2)
SODIUM SERPL-SCNC: 139 MMOL/L (ref 136–145)
T4 FREE SERPL-MCNC: 1.4 NG/DL (ref 0.8–1.7)
TRIGL SERPL-MCNC: 170 MG/DL (ref 30–149)
TSI SER-ACNC: 1.74 UIU/ML (ref 0.55–4.78)
VIT D+METAB SERPL-MCNC: 6.5 NG/ML (ref 30–100)
VLDLC SERPL CALC-MCNC: 34 MG/DL (ref 0–30)

## 2024-12-12 PROCEDURE — 80053 COMPREHEN METABOLIC PANEL: CPT | Performed by: NURSE PRACTITIONER

## 2024-12-12 PROCEDURE — G2211 COMPLEX E/M VISIT ADD ON: HCPCS | Performed by: NURSE PRACTITIONER

## 2024-12-12 PROCEDURE — 82306 VITAMIN D 25 HYDROXY: CPT | Performed by: NURSE PRACTITIONER

## 2024-12-12 PROCEDURE — 84443 ASSAY THYROID STIM HORMONE: CPT | Performed by: NURSE PRACTITIONER

## 2024-12-12 PROCEDURE — 3074F SYST BP LT 130 MM HG: CPT | Performed by: NURSE PRACTITIONER

## 2024-12-12 PROCEDURE — 83036 HEMOGLOBIN GLYCOSYLATED A1C: CPT | Performed by: NURSE PRACTITIONER

## 2024-12-12 PROCEDURE — 3078F DIAST BP <80 MM HG: CPT | Performed by: NURSE PRACTITIONER

## 2024-12-12 PROCEDURE — 84439 ASSAY OF FREE THYROXINE: CPT | Performed by: NURSE PRACTITIONER

## 2024-12-12 PROCEDURE — 84100 ASSAY OF PHOSPHORUS: CPT | Performed by: NURSE PRACTITIONER

## 2024-12-12 PROCEDURE — 99214 OFFICE O/P EST MOD 30 MIN: CPT | Performed by: NURSE PRACTITIONER

## 2024-12-12 PROCEDURE — 3008F BODY MASS INDEX DOCD: CPT | Performed by: NURSE PRACTITIONER

## 2024-12-12 PROCEDURE — 80061 LIPID PANEL: CPT | Performed by: NURSE PRACTITIONER

## 2024-12-12 RX ORDER — ATORVASTATIN CALCIUM 10 MG/1
10 TABLET, FILM COATED ORAL DAILY
Qty: 90 TABLET | Refills: 1 | Status: SHIPPED | OUTPATIENT
Start: 2024-12-12 | End: 2025-12-07

## 2024-12-12 NOTE — PROGRESS NOTES
Yusuf Alberto is a 24 year old male.    Chief Complaint   Patient presents with    Follow - Up     Room 10, [facility], follow up.       HPI:   Here for hospital follow up   was admitted  with DKA 10/29.  A1c 14.4  Has since followed up with [facility] as well as diabetic education.  On tresiba and short acting insulin.   He is working on improving this.  Has upcoming appt with Endo in December.  Cost is an issue.  He is not able to afford the dexcom.  He is interested in pump  discussing at upcoming appt.    Tresiba 28 with fast acting 1 u for every 8 carbs.      History of asthma.  No active.  No inhaler.  Doesn't need one.      Hypophosphatemia  check labs.     Dyslipidemia.   Last  in 2023  he was hoping it would improve with diet and exercise.  Discussed heriditary high cholesterol in setting of type 1 diabetes. Recommendaion for statin.  He agrees.  Due for labs.  He is not fasting but is aware we will still recommend to start statin anyway given his last LDL > 180 and DM.  Labs 3 mo.        Patient Active Problem List   Diagnosis    Uncontrolled type 1 diabetes mellitus with hyperglycemia (HCC)    Mixed hyperlipidemia    Type 1 diabetes mellitus with ketoacidosis without coma (HCC)     Current Outpatient Medications   Medication Sig Dispense Refill    atorvastatin (LIPITOR) 10 MG Oral Tab Take 1 tablet (10 mg total) by mouth daily. 90 tablet 1    Continuous Glucose Sensor (DEXCOM G7 SENSOR) Does not apply Misc 1 each Every 10 days. 9 each 1    Urine Glucose-Ketones Test In Vitro Strip Use as needed, with ANY episode of VOMITING, during illness, and/or if 2+ BG >250 despite usual correction. 100 strip 0    TRESIBA FLEXTOUCH 200 UNIT/ML Subcutaneous Solution Pen-injector Inject 28 units every evening  (For insurance purposes only: TDD max: 34 units per day) 30 mL 1    insulin aspart 100 Units/mL Subcutaneous Solution Pen-injector 1 U for every 40 mg/dL above 140 mg/dL for maximum 20 U daily 1 each 0     insulin aspart 100 Units/mL Subcutaneous Solution Pen-injector 1 U for every 10 grams of carbs for maximum of 15 U daily 1 each 0    Blood Glucose Monitoring Suppl (ONETOUCH VERIO FLEX SYSTEM) w/Device Does not apply Kit Test blood sugar 3 times per day. 1 kit 0    Glucose Blood (ONETOUCH VERIO) In Vitro Strip Test blood sugar 3 times per day. 100 strip 6    OneTouch Delica Lancets 33G Does not apply Misc Test blood sugar 3 times per day. 100 each 6    Continuous Blood Gluc Transmit (DEXCOM G6 TRANSMITTER) Does not apply Misc 1 each every 3 (three) months. 1 each 3    Continuous Blood Gluc Sensor (DEXCOM G6 SENSOR) Does not apply Misc 1 each Every 10 days. Use as directed every 10 days 3 each 11    Continuous Blood Gluc Sensor (DEXCOM G7 SENSOR) Does not apply Misc 1 each Every 10 days. 9 each 3    glucagon (GVOKE HYPOPEN 2-PACK) 1 MG/0.2ML Subcutaneous SUBQ injection Inject 0.2 mL (1 mg total) into the skin as needed. 0.4 mL 1    Insulin Pen Needle (PEN NEEDLES) 32G X 4 MM Does not apply Misc 5 each daily. 500 each 1      Past Medical History:    Asthma (HCC)    Diabetes mellitus (HCC)    Extrinsic asthma, unspecified    Seasonal allergies    Type 1 diabetes mellitus (HCC)      Social History:  Social History     Socioeconomic History    Marital status: Single   Tobacco Use    Smoking status: Never     Passive exposure: Never    Smokeless tobacco: Never   Vaping Use    Vaping status: Never Used   Substance and Sexual Activity    Alcohol use: Not Currently     Alcohol/week: 4.0 standard drinks of alcohol     Types: 4 Glasses of wine per week    Drug use: Never     Social Drivers of Health     Financial Resource Strain: Low Risk  (1/2/2024)    Financial Resource Strain     Difficulty of Paying Living Expenses: Not hard at all     Med Affordability: No   Food Insecurity: No Food Insecurity (10/29/2024)    Food Insecurity     Food Insecurity: Never true   Transportation Needs: No Transportation Needs (10/29/2024)     Transportation Needs     Lack of Transportation: No   Housing Stability: Low Risk  (10/29/2024)    Housing Stability     Housing Instability: No     Family History   Problem Relation Age of Onset    Hypertension Mother     Hypertension Maternal Grandmother     Diabetes Maternal Grandmother     Cancer Maternal Grandmother     Lipids Maternal Grandmother     Stroke Maternal Grandmother     Other (atrial flutter) Maternal Grandmother     Hypertension Maternal Grandfather     Lipids Maternal Grandfather     Cancer Maternal Grandfather         Allergies  Allergies[1]    REVIEW OF SYSTEMS:   GENERAL HEALTH: feels well otherwise  RESPIRATORY: denies shortness of breath with exertion, no cough  CARDIOVASCULAR: denies chest pain on exertion, no palpatations  NEURO: denies headaches,     EXAM:   /72 (BP Location: Left arm, Patient Position: Sitting, Cuff Size: adult)   Pulse 105   Ht 5' 4\" (1.626 m)   Wt 143 lb 9.6 oz (65.1 kg)   SpO2 98%   BMI 24.65 kg/m²   GENERAL: well developed, well nourished,in no apparent distress  LUNGS: normal rate without respiratory distress, lungs clear to auscultation  CARDIO: RRR without murmur  GI: normal bowel sounds, no masses, HSM or tenderness  EXTREMITIES: no edema, normal strength and tone  PSYCH: alert and oriented x 3    ASSESSMENT AND PLAN:     Encounter Diagnoses   Name Primary?    Type 1 diabetes mellitus with ketoacidosis without coma (HCC)  per Endo.   Yes    Hypophosphatemia  check labs.      Hyponatremia  check labs.      Mixed hyperlipidemia  start lipitor 10mg.  Labs 3 mo.  Lipid today for baseline although he is not fasting.       History of asthma  not an issue.         Orders Placed This Encounter   Procedures    Comp Metabolic Panel (14) [E]    Phosphorus [E]    Lipid Panel [E]    CMP in 3 months    Lipid in 3 months    Microalb/Creat Ratio, Random Urine in 3 months       Meds & Refills for this Visit:  Requested Prescriptions     Signed Prescriptions Disp  Refills    atorvastatin (LIPITOR) 10 MG Oral Tab 90 tablet 1     Sig: Take 1 tablet (10 mg total) by mouth daily.       Imaging & Consults:  None    No follow-ups on file.  There are no Patient Instructions on file for this visit.       [1]   Allergies  Allergen Reactions    Dust ASTHMA    Seasonal

## 2024-12-13 DIAGNOSIS — E55.9 VITAMIN D DEFICIENCY: Primary | ICD-10-CM

## 2024-12-13 RX ORDER — ERGOCALCIFEROL 1.25 MG/1
50000 CAPSULE, LIQUID FILLED ORAL WEEKLY
Qty: 12 CAPSULE | Refills: 0 | Status: SHIPPED | OUTPATIENT
Start: 2024-12-13 | End: 2025-03-13

## 2025-02-07 RX ORDER — INSULIN LISPRO 100 [IU]/ML
INJECTION, SOLUTION INTRAVENOUS; SUBCUTANEOUS
Qty: 15 ML | Refills: 0 | OUTPATIENT
Start: 2025-02-07

## 2025-03-28 ENCOUNTER — HOSPITAL ENCOUNTER (INPATIENT)
Facility: HOSPITAL | Age: 25
LOS: 3 days | Discharge: HOME | End: 2025-03-31
Attending: EMERGENCY MEDICINE | Admitting: HOSPITALIST

## 2025-03-28 ENCOUNTER — APPOINTMENT (OUTPATIENT)
Dept: GENERAL RADIOLOGY | Facility: HOSPITAL | Age: 25
End: 2025-03-28
Attending: EMERGENCY MEDICINE

## 2025-03-28 DIAGNOSIS — E10.10 TYPE 1 DIABETES MELLITUS WITH KETOACIDOSIS WITHOUT COMA (HCC): Primary | ICD-10-CM

## 2025-03-28 PROBLEM — E11.10 DKA (DIABETIC KETOACIDOSIS) (HCC): Status: ACTIVE | Noted: 2025-03-28

## 2025-03-28 PROBLEM — E87.20 METABOLIC ACIDOSIS: Status: ACTIVE | Noted: 2025-03-28

## 2025-03-28 LAB
ALBUMIN SERPL-MCNC: 5.4 G/DL (ref 3.2–4.8)
ALBUMIN/GLOB SERPL: 1.9 {RATIO} (ref 1–2)
ALP LIVER SERPL-CCNC: 77 U/L
ALT SERPL-CCNC: 24 U/L
AST SERPL-CCNC: 28 U/L (ref ?–34)
ATRIAL RATE: 106 BPM
BASE EXCESS BLDV CALC-SCNC: -22.9 MMOL/L
BASOPHILS # BLD AUTO: 0.05 X10(3) UL (ref 0–0.2)
BASOPHILS NFR BLD AUTO: 0.4 %
BILIRUB SERPL-MCNC: 0.6 MG/DL (ref 0.3–1.2)
BILIRUB UR QL STRIP.AUTO: NEGATIVE
BUN BLD-MCNC: 13 MG/DL (ref 9–23)
BUN BLD-MCNC: 9 MG/DL (ref 9–23)
CALCIUM BLD-MCNC: 8.8 MG/DL (ref 8.7–10.6)
CALCIUM BLD-MCNC: 9.8 MG/DL (ref 8.7–10.6)
CHLORIDE SERPL-SCNC: 102 MMOL/L (ref 98–112)
CHLORIDE SERPL-SCNC: 110 MMOL/L (ref 98–112)
CHOLEST SERPL-MCNC: 250 MG/DL (ref ?–200)
CLARITY UR REFRACT.AUTO: CLEAR
CO2 SERPL-SCNC: <10 MMOL/L (ref 21–32)
CO2 SERPL-SCNC: <10 MMOL/L (ref 21–32)
CREAT BLD-MCNC: 1.27 MG/DL
CREAT BLD-MCNC: 1.5 MG/DL
EGFRCR SERPLBLD CKD-EPI 2021: 65 ML/MIN/1.73M2 (ref 60–?)
EGFRCR SERPLBLD CKD-EPI 2021: 80 ML/MIN/1.73M2 (ref 60–?)
EOSINOPHIL # BLD AUTO: 0.11 X10(3) UL (ref 0–0.7)
EOSINOPHIL NFR BLD AUTO: 0.8 %
ERYTHROCYTE [DISTWIDTH] IN BLOOD BY AUTOMATED COUNT: 11.7 %
EST. AVERAGE GLUCOSE BLD GHB EST-MCNC: 407 MG/DL (ref 68–126)
GLOBULIN PLAS-MCNC: 2.8 G/DL (ref 2–3.5)
GLUCOSE BLD-MCNC: 152 MG/DL (ref 70–99)
GLUCOSE BLD-MCNC: 178 MG/DL (ref 70–99)
GLUCOSE BLD-MCNC: 191 MG/DL (ref 70–99)
GLUCOSE BLD-MCNC: 193 MG/DL (ref 70–99)
GLUCOSE BLD-MCNC: 195 MG/DL (ref 70–99)
GLUCOSE BLD-MCNC: 199 MG/DL (ref 70–99)
GLUCOSE BLD-MCNC: 200 MG/DL (ref 70–99)
GLUCOSE BLD-MCNC: 234 MG/DL (ref 70–99)
GLUCOSE BLD-MCNC: 245 MG/DL (ref 70–99)
GLUCOSE BLD-MCNC: 276 MG/DL (ref 70–99)
GLUCOSE BLD-MCNC: 290 MG/DL (ref 70–99)
GLUCOSE BLD-MCNC: 321 MG/DL (ref 70–99)
GLUCOSE UR STRIP.AUTO-MCNC: >1000 MG/DL
HBA1C MFR BLD: 15.8 % (ref ?–5.7)
HCO3 BLDV-SCNC: 6.9 MEQ/L (ref 22–26)
HCT VFR BLD AUTO: 51.5 %
HDLC SERPL-MCNC: 38 MG/DL (ref 40–59)
HGB BLD-MCNC: 17.5 G/DL
HYALINE CASTS #/AREA URNS AUTO: PRESENT /LPF
IMM GRANULOCYTES # BLD AUTO: 0.05 X10(3) UL (ref 0–1)
IMM GRANULOCYTES NFR BLD: 0.4 %
KETONES UR STRIP.AUTO-MCNC: >150 MG/DL
LDLC SERPL CALC-MCNC: 155 MG/DL (ref ?–100)
LEUKOCYTE ESTERASE UR QL STRIP.AUTO: NEGATIVE
LIPASE SERPL-CCNC: 23 U/L (ref 12–53)
LYMPHOCYTES # BLD AUTO: 2.41 X10(3) UL (ref 1–4)
LYMPHOCYTES NFR BLD AUTO: 18.5 %
MAGNESIUM SERPL-MCNC: 2 MG/DL (ref 1.6–2.6)
MCH RBC QN AUTO: 29.3 PG (ref 26–34)
MCHC RBC AUTO-ENTMCNC: 34 G/DL (ref 31–37)
MCV RBC AUTO: 86.3 FL
MONOCYTES # BLD AUTO: 0.73 X10(3) UL (ref 0.1–1)
MONOCYTES NFR BLD AUTO: 5.6 %
MRSA DNA SPEC QL NAA+PROBE: NEGATIVE
NEUTROPHILS # BLD AUTO: 9.66 X10 (3) UL (ref 1.5–7.7)
NEUTROPHILS # BLD AUTO: 9.66 X10(3) UL (ref 1.5–7.7)
NEUTROPHILS NFR BLD AUTO: 74.3 %
NITRITE UR QL STRIP.AUTO: NEGATIVE
NONHDLC SERPL-MCNC: 212 MG/DL (ref ?–130)
OSMOLALITY SERPL CALC.SUM OF ELEC: 282 MOSM/KG (ref 275–295)
OSMOLALITY SERPL CALC.SUM OF ELEC: 288 MOSM/KG (ref 275–295)
OXYHGB MFR BLDV: 90.6 % (ref 72–78)
P AXIS: 79 DEGREES
P-R INTERVAL: 134 MS
PCO2 BLDV: 20 MM HG (ref 38–50)
PH BLDV: 7.06 [PH] (ref 7.33–7.43)
PH UR STRIP.AUTO: 5.5 [PH] (ref 5–8)
PHOSPHATE SERPL-MCNC: 2.5 MG/DL (ref 2.4–5.1)
PHOSPHATE SERPL-MCNC: 3.5 MG/DL (ref 2.4–5.1)
PLATELET # BLD AUTO: 297 10(3)UL (ref 150–450)
PO2 BLDV: 63 MM HG (ref 30–50)
POTASSIUM SERPL-SCNC: 4.3 MMOL/L (ref 3.5–5.1)
POTASSIUM SERPL-SCNC: 4.5 MMOL/L (ref 3.5–5.1)
PROT SERPL-MCNC: 8.2 G/DL (ref 5.7–8.2)
PROT UR STRIP.AUTO-MCNC: 50 MG/DL
Q-T INTERVAL: 342 MS
QRS DURATION: 90 MS
QTC CALCULATION (BEZET): 454 MS
R AXIS: 62 DEGREES
RBC # BLD AUTO: 5.97 X10(6)UL
RBC #/AREA URNS AUTO: >10 /HPF
SODIUM SERPL-SCNC: 133 MMOL/L (ref 136–145)
SODIUM SERPL-SCNC: 134 MMOL/L (ref 136–145)
SP GR UR STRIP.AUTO: 1.02 (ref 1–1.03)
T AXIS: 46 DEGREES
TRIGL SERPL-MCNC: 308 MG/DL (ref 30–149)
TROPONIN I SERPL HS-MCNC: <3 NG/L
UROBILINOGEN UR STRIP.AUTO-MCNC: NORMAL MG/DL
VENTRICULAR RATE: 106 BPM
VLDLC SERPL CALC-MCNC: 61 MG/DL (ref 0–30)
WBC # BLD AUTO: 13 X10(3) UL (ref 4–11)

## 2025-03-28 PROCEDURE — 71045 X-RAY EXAM CHEST 1 VIEW: CPT | Performed by: EMERGENCY MEDICINE

## 2025-03-28 PROCEDURE — 99291 CRITICAL CARE FIRST HOUR: CPT | Performed by: HOSPITALIST

## 2025-03-28 RX ORDER — POTASSIUM CHLORIDE 14.9 MG/ML
20 INJECTION INTRAVENOUS ONCE
Status: COMPLETED | OUTPATIENT
Start: 2025-03-28 | End: 2025-03-28

## 2025-03-28 RX ORDER — DEXTROSE MONOHYDRATE 25 G/50ML
50 INJECTION, SOLUTION INTRAVENOUS
Status: DISCONTINUED | OUTPATIENT
Start: 2025-03-28 | End: 2025-03-31

## 2025-03-28 RX ORDER — NICOTINE POLACRILEX 4 MG
15 LOZENGE BUCCAL
Status: DISCONTINUED | OUTPATIENT
Start: 2025-03-28 | End: 2025-03-31

## 2025-03-28 RX ORDER — ONDANSETRON 2 MG/ML
4 INJECTION INTRAMUSCULAR; INTRAVENOUS EVERY 6 HOURS PRN
Status: DISCONTINUED | OUTPATIENT
Start: 2025-03-28 | End: 2025-03-31

## 2025-03-28 RX ORDER — BISACODYL 10 MG
10 SUPPOSITORY, RECTAL RECTAL
Status: DISCONTINUED | OUTPATIENT
Start: 2025-03-28 | End: 2025-03-31

## 2025-03-28 RX ORDER — SODIUM CHLORIDE, SODIUM LACTATE, POTASSIUM CHLORIDE, CALCIUM CHLORIDE 600; 310; 30; 20 MG/100ML; MG/100ML; MG/100ML; MG/100ML
INJECTION, SOLUTION INTRAVENOUS CONTINUOUS
Status: DISCONTINUED | OUTPATIENT
Start: 2025-03-28 | End: 2025-03-29

## 2025-03-28 RX ORDER — DEXTROSE MONOHYDRATE AND SODIUM CHLORIDE 5; .45 G/100ML; G/100ML
INJECTION, SOLUTION INTRAVENOUS CONTINUOUS
Status: DISCONTINUED | OUTPATIENT
Start: 2025-03-28 | End: 2025-03-28

## 2025-03-28 RX ORDER — PROCHLORPERAZINE EDISYLATE 5 MG/ML
5 INJECTION INTRAMUSCULAR; INTRAVENOUS EVERY 8 HOURS PRN
Status: DISCONTINUED | OUTPATIENT
Start: 2025-03-28 | End: 2025-03-31

## 2025-03-28 RX ORDER — DEXTROSE, SODIUM CHLORIDE, SODIUM LACTATE, POTASSIUM CHLORIDE, AND CALCIUM CHLORIDE 5; .6; .31; .03; .02 G/100ML; G/100ML; G/100ML; G/100ML; G/100ML
INJECTION, SOLUTION INTRAVENOUS CONTINUOUS
Status: DISCONTINUED | OUTPATIENT
Start: 2025-03-28 | End: 2025-03-29

## 2025-03-28 RX ORDER — DEXTROSE MONOHYDRATE 25 G/50ML
50 INJECTION, SOLUTION INTRAVENOUS
Status: DISCONTINUED | OUTPATIENT
Start: 2025-03-28 | End: 2025-03-28

## 2025-03-28 RX ORDER — SODIUM CHLORIDE 9 MG/ML
250 INJECTION, SOLUTION INTRAVENOUS ONCE
Status: COMPLETED | OUTPATIENT
Start: 2025-03-28 | End: 2025-03-28

## 2025-03-28 RX ORDER — DEXTROSE MONOHYDRATE AND SODIUM CHLORIDE 5; .45 G/100ML; G/100ML
100 INJECTION, SOLUTION INTRAVENOUS CONTINUOUS PRN
Status: DISCONTINUED | OUTPATIENT
Start: 2025-03-28 | End: 2025-03-28

## 2025-03-28 RX ORDER — POLYETHYLENE GLYCOL 3350 17 G/17G
17 POWDER, FOR SOLUTION ORAL DAILY PRN
Status: DISCONTINUED | OUTPATIENT
Start: 2025-03-28 | End: 2025-03-31

## 2025-03-28 RX ORDER — ONDANSETRON 2 MG/ML
4 INJECTION INTRAMUSCULAR; INTRAVENOUS ONCE
Status: COMPLETED | OUTPATIENT
Start: 2025-03-28 | End: 2025-03-28

## 2025-03-28 RX ORDER — SENNOSIDES 8.6 MG
17.2 TABLET ORAL NIGHTLY PRN
Status: DISCONTINUED | OUTPATIENT
Start: 2025-03-28 | End: 2025-03-31

## 2025-03-28 RX ORDER — ECHINACEA PURPUREA EXTRACT 125 MG
1 TABLET ORAL
Status: DISCONTINUED | OUTPATIENT
Start: 2025-03-28 | End: 2025-03-31

## 2025-03-28 RX ORDER — ACETAMINOPHEN 500 MG
1000 TABLET ORAL EVERY 4 HOURS PRN
Status: DISCONTINUED | OUTPATIENT
Start: 2025-03-28 | End: 2025-03-31

## 2025-03-28 RX ORDER — ENOXAPARIN SODIUM 100 MG/ML
40 INJECTION SUBCUTANEOUS NIGHTLY
Status: DISCONTINUED | OUTPATIENT
Start: 2025-03-28 | End: 2025-03-31

## 2025-03-28 RX ORDER — NICOTINE POLACRILEX 4 MG
30 LOZENGE BUCCAL
Status: DISCONTINUED | OUTPATIENT
Start: 2025-03-28 | End: 2025-03-31

## 2025-03-28 RX ORDER — FAMOTIDINE 10 MG/ML
20 INJECTION, SOLUTION INTRAVENOUS ONCE
Status: COMPLETED | OUTPATIENT
Start: 2025-03-28 | End: 2025-03-28

## 2025-03-28 RX ORDER — NICOTINE POLACRILEX 4 MG
30 LOZENGE BUCCAL
Status: DISCONTINUED | OUTPATIENT
Start: 2025-03-28 | End: 2025-03-28

## 2025-03-28 RX ORDER — NICOTINE POLACRILEX 4 MG
15 LOZENGE BUCCAL
Status: DISCONTINUED | OUTPATIENT
Start: 2025-03-28 | End: 2025-03-28

## 2025-03-28 RX ORDER — SODIUM PHOSPHATE, DIBASIC AND SODIUM PHOSPHATE, MONOBASIC 7; 19 G/230ML; G/230ML
1 ENEMA RECTAL ONCE AS NEEDED
Status: DISCONTINUED | OUTPATIENT
Start: 2025-03-28 | End: 2025-03-31

## 2025-03-28 NOTE — ED PROVIDER NOTES
Patient Seen in: [facility] Emergency Department      History     Chief Complaint   Patient presents with    Nausea/Vomiting/Diarrhea     Stated Complaint: vomiting started last night type 1 diabetic    Subjective:   The history is provided by the patient.         24-year-old male with history significant for type 1 diabetes presenting to the ER for nausea, vomiting, dehydration, headache since yesterday.  Patient states that for the past month he has been using his insulin sparingly, says he lost his health insurance, unable to get refills, trying to stretch out his insulin.  He states he is avoiding carbohydrates altogether, only eating protein.  In triage he stated he thinks he is in DKA.  Denies abdominal pain, cough, shortness of breath, chest pain, fevers, chills    Objective:     Past Medical History:    Asthma (HCC)    Diabetes mellitus (HCC)    Extrinsic asthma, unspecified    Seasonal allergies    Type 1 diabetes mellitus (HCC)              History reviewed. No pertinent surgical history.             Social History     Socioeconomic History    Marital status: Single   Tobacco Use    Smoking status: Never     Passive exposure: Never    Smokeless tobacco: Never   Vaping Use    Vaping status: Never Used   Substance and Sexual Activity    Alcohol use: Not Currently     Alcohol/week: 4.0 standard drinks of alcohol     Types: 4 Glasses of wine per week    Drug use: Never     Social Drivers of Health     Food Insecurity: No Food Insecurity (10/29/2024)    Food Insecurity     Food Insecurity: Never true   Transportation Needs: No Transportation Needs (10/29/2024)    Transportation Needs     Lack of Transportation: No   Housing Stability: Low Risk  (10/29/2024)    Housing Stability     Housing Instability: No                  Physical Exam     ED Triage Vitals [03/28/25 1322]   /80   Pulse (!) 128   Resp 24   Temp 98.5 °F (36.9 °C)   Temp src Temporal   SpO2 96 %   O2 Device None (Room air)        Current Vitals:   Vital Signs  BP: 126/80  Pulse: 104  Resp: 18  Temp: 98.8 °F (37.1 °C)  Temp src: Temporal    Oxygen Therapy  SpO2: 100 %  O2 Device: None (Room air)        Physical Exam  Vitals and nursing note reviewed.   Constitutional:       General: He is in acute distress.      Appearance: He is well-developed. He is ill-appearing.   HENT:      Head: Normocephalic and atraumatic.      Mouth/Throat:      Mouth: Mucous membranes are dry.   Eyes:      Extraocular Movements: Extraocular movements intact.      Pupils: Pupils are equal, round, and reactive to light.   Cardiovascular:      Rate and Rhythm: Regular rhythm. Tachycardia present.      Pulses: Normal pulses.      Heart sounds: Normal heart sounds. No murmur heard.     No gallop.   Pulmonary:      Effort: Pulmonary effort is normal. No respiratory distress.      Breath sounds: Normal breath sounds.   Abdominal:      General: Abdomen is flat.      Palpations: Abdomen is soft.      Tenderness: There is no abdominal tenderness.   Musculoskeletal:         General: Normal range of motion.      Cervical back: Neck supple.   Skin:     General: Skin is warm and dry.      Capillary Refill: Capillary refill takes 2 to 3 seconds.   Neurological:      General: No focal deficit present.      Mental Status: He is alert and oriented to person, place, and time.   Psychiatric:      Comments: Depressed mood, flat affect             ED Course     Labs Reviewed   CBC WITH DIFFERENTIAL WITH PLATELET - Abnormal; Notable for the following components:       Result Value    WBC 13.0 (*)     RBC 5.97 (*)     Neutrophil Absolute Prelim 9.66 (*)     Neutrophil Absolute 9.66 (*)     All other components within normal limits   COMP METABOLIC PANEL (14) - Abnormal; Notable for the following components:    Glucose 321 (*)     Sodium 133 (*)     CO2 <10.0 (*)     Creatinine 1.50 (*)     Albumin 5.4 (*)     All other components within normal limits   VENOUS BLOOD GAS - Abnormal;  Notable for the following components:    Venous pH 7.06 (*)     Venous pCO2 20 (*)     Venous pO2 63 (*)     Venous HCO3 6.9 (*)     Venous O2Hb 90.6 (*)     All other components within normal limits   LIPID PANEL - Abnormal; Notable for the following components:    Cholesterol, Total 250 (*)     HDL Cholesterol 38 (*)     Triglycerides 308 (*)     LDL Cholesterol 155 (*)     VLDL 61 (*)     Non HDL Chol 212 (*)     All other components within normal limits   HEMOGLOBIN A1C - Abnormal; Notable for the following components:    HgbA1C 15.8 (*)     Estimated Average Glucose 407 (*)     All other components within normal limits   POCT GLUCOSE - Abnormal; Notable for the following components:    POC Glucose 290 (*)     All other components within normal limits   POCT GLUCOSE - Abnormal; Notable for the following components:    POC Glucose 276 (*)     All other components within normal limits   POCT GLUCOSE - Abnormal; Notable for the following components:    POC Glucose 245 (*)     All other components within normal limits   POCT GLUCOSE - Abnormal; Notable for the following components:    POC Glucose 234 (*)     All other components within normal limits   TROPONIN I HIGH SENSITIVITY - Normal   PHOSPHORUS - Normal   LIPASE - Normal   BETA HYDROXYBUTYRATE   URINALYSIS, ROUTINE   C-PEPTIDE   BASIC METABOLIC PANEL (8)   MAGNESIUM   PHOSPHORUS   RAINBOW DRAW LAVENDER   RAINBOW DRAW LIGHT GREEN   RAINBOW DRAW BLUE   RAINBOW DRAW GOLD   ED/MRSA SCREEN BY PCR-CC            XR CHEST AP PORTABLE  (CPT=71045)    Result Date: 3/28/2025  CONCLUSION:  There is no evidence of active cardiopulmonary disease on this single portable chest radiograph.   LOCATION:  [facility]      Dictated by (CST): Orestes Sanches MD on 3/28/2025 at 5:07 PM     Finalized by (CST): Orestes Sanches MD on 3/28/2025 at 5:07 PM             [facility]          Admission disposition: 3/28/2025  3:24 PM           Medical Decision Making  Problems Addressed:  Type 1 diabetes  mellitus with ketoacidosis without coma (HCC): complicated acute illness or injury with systemic symptoms that poses a threat to life or bodily functions     Details: Patient unfortunately lost his health insurance and has been rationing his insulin for the past month.  This is contributing to DKA episode today.  No signs or symptoms of some chest any infection at this time.  Negative troponin.  Started on IV fluid resuscitation and insulin infusion.  Plan for ICU admission    Amount and/or Complexity of Data Reviewed  Labs: ordered.     Details: I independently interpreted labs, consistent with DKA.  His potassium is 4.5 within appropriate limits.  Radiology: ordered and independent interpretation performed.     Details: I independently interpreted chest x-ray, no acute findings.  Additional details per radiology.  ECG/medicine tests: ordered and independent interpretation performed.     Details: EKG INTERPRETATION  Time: 15:05  Sinus tachycardia, ventricular rate is 106  Normal axis  Normal CT, QRS, and QTc intervals  No chamber enlargement  Normal ST-T segments  I, the emergency physician, independently interpreted this EKG in the absence of a cardiologist.    Discussion of management or test interpretation with external provider(s): Discussed case with hospitalist and intensivist, they accept patient for admission to intensive care unit.    Risk  Parenteral controlled substances.  Drug therapy requiring intensive monitoring for toxicity.  Decision regarding hospitalization.  Diagnosis or treatment significantly limited by social determinants of health.    Critical Care  Total time providing critical care: 50 minutes (Upon my evaluation, this patient had a high probability of imminent or life-threatening deterioration due to critical findings of laboratory tests, which required my direct attention, intervention, and personal management.    I have personally provided 50 minutes of critical care time, exclusive of  time spent on separately billable procedures.  Time includes review of all pertinent laboratory/radiology results, discussion with consultants, and monitoring for potential decompensation.  Performed interventions included fluids and insulin infusions  )        Disposition and Plan     Clinical Impression:  1. Type 1 diabetes mellitus with ketoacidosis without coma (HCC)         Disposition:  Admit  3/28/2025  3:24 pm    Follow-up:  No follow-up provider specified.        Medications Prescribed:  Current Discharge Medication List              Supplementary Documentation:         Hospital Problems       Present on Admission  Date Reviewed: 12/12/2024            ICD-10-CM Noted POA    * (Principal) Type 1 diabetes mellitus with ketoacidosis without coma (HCC) E10.10 10/29/2024 Unknown    DKA (diabetic ketoacidosis) (HCC) E11.10 3/28/2025 Unknown    Metabolic acidosis E87.20 3/28/2025 Yes

## 2025-03-28 NOTE — PROGRESS NOTES
Patient to ICU from ER at 1610. Patient drowsy but aox4, VSS. Patient on insulin gtt with IVF per DKA protocol. Plan for q4 hour labs and q1 hour BG checks. Titrating insulin per order. See MAR and flowsheets for documentation details.

## 2025-03-28 NOTE — ED INITIAL ASSESSMENT (HPI)
Pt in from home. Pt is a type one diabetic and states he feels like he is in DKA. Pt started vomiting last night around 9pm. Pt's blood sugar was 270 at 1am. Pt did not take any insulin due to almost being out of it. Pt having labored respirations in triage garcia.

## 2025-03-28 NOTE — H&P
[facility]     Yusuf Alberto Patient Status:  Emergency    2000 MRN [id number]   Location [facility] EMERGENCY DEPARTMENT Attending Alex Juarez MD   Hosp Day # 0 PCP Chiki Hernandez MD     Chief Complaint:   Chief Complaint   Patient presents with    Nausea/Vomiting/Diarrhea       Subjective:    History of Present Illness:     Yusuf Alberto is a 24 year old male with a past medical history of DM1, asthma.  He has greyson non compliant with his insulin and started to have nausea/vomiting and dyspnea.  He came to the ED and is noted to be in DKA.    History/Other:    Past Medical History:  Past Medical History:    Asthma (HCC)    Diabetes mellitus (HCC)    Extrinsic asthma, unspecified    Seasonal allergies    Type 1 diabetes mellitus (HCC)     Past Surgical History:   History reviewed. No pertinent surgical history.   Family History:   Family History   Problem Relation Age of Onset    Hypertension Mother     Hypertension Maternal Grandmother     Diabetes Maternal Grandmother     Cancer Maternal Grandmother     Lipids Maternal Grandmother     Stroke Maternal Grandmother     Other (atrial flutter) Maternal Grandmother     Hypertension Maternal Grandfather     Lipids Maternal Grandfather     Cancer Maternal Grandfather      Social History:    reports that he has never smoked. He has never been exposed to tobacco smoke. He has never used smokeless tobacco. He reports that he does not currently use alcohol after a past usage of about 4.0 standard drinks of alcohol per week. He reports that he does not use drugs.     Allergies: Allergies[1]    Medications:  Medications Ordered Prior to Encounter[2]    Review of Systems:   A comprehensive review of systems was completed.    Pertinent positives and negatives noted in the HPI.    Objective:   Physical Exam:    /80   Pulse (!) 128   Temp 98.5 °F (36.9 °C) (Temporal)   Resp 24   Ht 5' 3\" (1.6 m)   Wt 140 lb (63.5 kg)   SpO2  96%   BMI 24.80 kg/m²   General: mild  acute distress, Alert  Respiratory: No rhonchi, no wheezes  Cardiovascular: S1, S2.   Abdomen: Soft, Non-tender, Non-distended, Positive bowel sounds  Neuro: No new focal deficits  Extremities: No edema, dyr MM      Results:    Labs:      Labs Last 24 Hours:  Recent Labs   Lab 03/28/25  1358   WBC 13.0*   HGB 17.5   MCV 86.3   .0       Recent Labs   Lab 03/28/25  1358   *   BUN 13   CREATSERUM 1.50*   CA 9.8   ALB 5.4*   *   K 4.5      CO2 <10.0*   ALKPHO 77   AST 28   ALT 24   BILT 0.6   TP 8.2       Estimated Glomerular Filtration Rate: 65 mL/min/1.73m2 (result from lab).    Recent Labs   Lab 03/28/25  1358   TROPHS <3       No results for input(s): \"PTP\", \"INR\" in the last 168 hours.    No results for input(s): \"TROP\", \"CK\" in the last 168 hours.      Imaging: Imaging data reviewed in Epic.    Assessment & Plan:      #DKA  IV insulin   Monitor AG  IVF  NPO until gap closed    #Severe Acidosis  Due to above  Monitor  LR  Consider bicarb depending on direction    #Asthma  Stable    #HLD    #JOSUE  IVF    All diagnosis' and recommendations discussed with patient and/or family in detail.      Plan of care discussed with ED physician      Soham Hein MD    Upon my evaluation, this patient had a high probability of imminent or life-threatening deterioration due to critical findings of laboratory tests and diabetic ketoacidosis, which required my direct attention, intervention, and personal management.    I have personally provided 43 minutes of critical care time, exclusive of time spent on separately billable procedures.  Time includes review of all pertinent laboratory/radiology results, discussion with consultants, and monitoring for potential decompensation.  Performed interventions included fluids and insulin infusions.      Supplementary Documentation:     The 21st Century Cures Act makes medical notes like these available to patients in the  interest of transparency. Please be advised this is a medical document. Medical documents are intended to carry relevant information, facts as evident, and the clinical opinion of the practitioner. The medical note is intended as peer to peer communication and may appear blunt or direct. It is written in medical language and may contain abbreviations or verbiage that are unfamiliar.                                         [1]   Allergies  Allergen Reactions    Dust ASTHMA    Seasonal    [2]   No current facility-administered medications on file prior to encounter.     Current Outpatient Medications on File Prior to Encounter   Medication Sig Dispense Refill    potassium and sodium phosphates 280-160-250 MG Oral Powd Pack 1 packet bid x 3 days. 6 packet 0    atorvastatin (LIPITOR) 10 MG Oral Tab Take 1 tablet (10 mg total) by mouth daily. 90 tablet 1    Continuous Glucose Sensor (DEXCOM G7 SENSOR) Does not apply Misc 1 each Every 10 days. 9 each 1    Urine Glucose-Ketones Test In Vitro Strip Use as needed, with ANY episode of VOMITING, during illness, and/or if 2+ BG >250 despite usual correction. 100 strip 0    TRESIBA FLEXTOUCH 200 UNIT/ML Subcutaneous Solution Pen-injector Inject 28 units every evening  (For insurance purposes only: TDD max: 34 units per day) 30 mL 1    insulin aspart 100 Units/mL Subcutaneous Solution Pen-injector 1 U for every 40 mg/dL above 140 mg/dL for maximum 20 U daily 1 each 0    insulin aspart 100 Units/mL Subcutaneous Solution Pen-injector 1 U for every 10 grams of carbs for maximum of 15 U daily 1 each 0    Blood Glucose Monitoring Suppl (ONETOUCH VERIO FLEX SYSTEM) w/Device Does not apply Kit Test blood sugar 3 times per day. 1 kit 0    Glucose Blood (ONETOUCH VERIO) In Vitro Strip Test blood sugar 3 times per day. 100 strip 6    OneTouch Delica Lancets 33G Does not apply Misc Test blood sugar 3 times per day. 100 each 6    Continuous Blood Gluc Transmit (DEXCOM G6 TRANSMITTER) Does  not apply Misc 1 each every 3 (three) months. 1 each 3    Continuous Blood Gluc Sensor (DEXCOM G6 SENSOR) Does not apply Misc 1 each Every 10 days. Use as directed every 10 days 3 each 11    Continuous Blood Gluc Sensor (DEXCOM G7 SENSOR) Does not apply Misc 1 each Every 10 days. 9 each 3    glucagon (GVOKE HYPOPEN 2-PACK) 1 MG/0.2ML Subcutaneous SUBQ injection Inject 0.2 mL (1 mg total) into the skin as needed. 0.4 mL 1    Insulin Pen Needle (PEN NEEDLES) 32G X 4 MM Does not apply Misc 5 each daily. 500 each 1

## 2025-03-29 LAB
ANION GAP SERPL CALC-SCNC: 10 MMOL/L (ref 0–18)
ANION GAP SERPL CALC-SCNC: 13 MMOL/L (ref 0–18)
ANION GAP SERPL CALC-SCNC: 14 MMOL/L (ref 0–18)
ANION GAP SERPL CALC-SCNC: 16 MMOL/L (ref 0–18)
ANION GAP SERPL CALC-SCNC: 9 MMOL/L (ref 0–18)
BUN BLD-MCNC: 10 MG/DL (ref 9–23)
BUN BLD-MCNC: 10 MG/DL (ref 9–23)
BUN BLD-MCNC: 11 MG/DL (ref 9–23)
BUN BLD-MCNC: 11 MG/DL (ref 9–23)
BUN BLD-MCNC: 8 MG/DL (ref 9–23)
CALCIUM BLD-MCNC: 8.4 MG/DL (ref 8.7–10.6)
CALCIUM BLD-MCNC: 8.5 MG/DL (ref 8.7–10.6)
CALCIUM BLD-MCNC: 8.9 MG/DL (ref 8.7–10.6)
CALCIUM BLD-MCNC: 8.9 MG/DL (ref 8.7–10.6)
CALCIUM BLD-MCNC: 9 MG/DL (ref 8.7–10.6)
CHLORIDE SERPL-SCNC: 111 MMOL/L (ref 98–112)
CHLORIDE SERPL-SCNC: 112 MMOL/L (ref 98–112)
CHLORIDE SERPL-SCNC: 113 MMOL/L (ref 98–112)
CO2 SERPL-SCNC: 13 MMOL/L (ref 21–32)
CO2 SERPL-SCNC: 15 MMOL/L (ref 21–32)
CO2 SERPL-SCNC: 16 MMOL/L (ref 21–32)
CO2 SERPL-SCNC: 19 MMOL/L (ref 21–32)
CO2 SERPL-SCNC: 20 MMOL/L (ref 21–32)
CREAT BLD-MCNC: 1.06 MG/DL
CREAT BLD-MCNC: 1.11 MG/DL
CREAT BLD-MCNC: 1.3 MG/DL
CREAT BLD-MCNC: 1.33 MG/DL
CREAT BLD-MCNC: 1.39 MG/DL
EGFRCR SERPLBLD CKD-EPI 2021: 72 ML/MIN/1.73M2 (ref 60–?)
EGFRCR SERPLBLD CKD-EPI 2021: 76 ML/MIN/1.73M2 (ref 60–?)
EGFRCR SERPLBLD CKD-EPI 2021: 78 ML/MIN/1.73M2 (ref 60–?)
EGFRCR SERPLBLD CKD-EPI 2021: 94 ML/MIN/1.73M2 (ref 60–?)
EGFRCR SERPLBLD CKD-EPI 2021: 99 ML/MIN/1.73M2 (ref 60–?)
ERYTHROCYTE [DISTWIDTH] IN BLOOD BY AUTOMATED COUNT: 12.1 %
GLUCOSE BLD-MCNC: 153 MG/DL (ref 70–99)
GLUCOSE BLD-MCNC: 156 MG/DL (ref 70–99)
GLUCOSE BLD-MCNC: 162 MG/DL (ref 70–99)
GLUCOSE BLD-MCNC: 163 MG/DL (ref 70–99)
GLUCOSE BLD-MCNC: 166 MG/DL (ref 70–99)
GLUCOSE BLD-MCNC: 166 MG/DL (ref 70–99)
GLUCOSE BLD-MCNC: 167 MG/DL (ref 70–99)
GLUCOSE BLD-MCNC: 168 MG/DL (ref 70–99)
GLUCOSE BLD-MCNC: 168 MG/DL (ref 70–99)
GLUCOSE BLD-MCNC: 169 MG/DL (ref 70–99)
GLUCOSE BLD-MCNC: 171 MG/DL (ref 70–99)
GLUCOSE BLD-MCNC: 173 MG/DL (ref 70–99)
GLUCOSE BLD-MCNC: 175 MG/DL (ref 70–99)
GLUCOSE BLD-MCNC: 177 MG/DL (ref 70–99)
GLUCOSE BLD-MCNC: 177 MG/DL (ref 70–99)
GLUCOSE BLD-MCNC: 180 MG/DL (ref 70–99)
GLUCOSE BLD-MCNC: 182 MG/DL (ref 70–99)
GLUCOSE BLD-MCNC: 182 MG/DL (ref 70–99)
GLUCOSE BLD-MCNC: 187 MG/DL (ref 70–99)
GLUCOSE BLD-MCNC: 193 MG/DL (ref 70–99)
GLUCOSE BLD-MCNC: 196 MG/DL (ref 70–99)
GLUCOSE BLD-MCNC: 198 MG/DL (ref 70–99)
GLUCOSE BLD-MCNC: 215 MG/DL (ref 70–99)
GLUCOSE BLD-MCNC: 233 MG/DL (ref 70–99)
HCT VFR BLD AUTO: 37.9 %
HGB BLD-MCNC: 13.8 G/DL
LACTATE SERPL-SCNC: 0.7 MMOL/L (ref 0.5–2)
MAGNESIUM SERPL-MCNC: 1.6 MG/DL (ref 1.6–2.6)
MAGNESIUM SERPL-MCNC: 1.7 MG/DL (ref 1.6–2.6)
MAGNESIUM SERPL-MCNC: 1.8 MG/DL (ref 1.6–2.6)
MAGNESIUM SERPL-MCNC: 1.9 MG/DL (ref 1.6–2.6)
MAGNESIUM SERPL-MCNC: 1.9 MG/DL (ref 1.6–2.6)
MCH RBC QN AUTO: 29.9 PG (ref 26–34)
MCHC RBC AUTO-ENTMCNC: 36.4 G/DL (ref 31–37)
MCV RBC AUTO: 82 FL
OSMOLALITY SERPL CALC.SUM OF ELEC: 294 MOSM/KG (ref 275–295)
OSMOLALITY SERPL CALC.SUM OF ELEC: 296 MOSM/KG (ref 275–295)
OSMOLALITY SERPL CALC.SUM OF ELEC: 297 MOSM/KG (ref 275–295)
OSMOLALITY SERPL CALC.SUM OF ELEC: 297 MOSM/KG (ref 275–295)
OSMOLALITY SERPL CALC.SUM OF ELEC: 298 MOSM/KG (ref 275–295)
PHOSPHATE SERPL-MCNC: 1.6 MG/DL (ref 2.4–5.1)
PHOSPHATE SERPL-MCNC: 1.6 MG/DL (ref 2.4–5.1)
PHOSPHATE SERPL-MCNC: 1.9 MG/DL (ref 2.4–5.1)
PHOSPHATE SERPL-MCNC: 2.2 MG/DL (ref 2.4–5.1)
PHOSPHATE SERPL-MCNC: 2.7 MG/DL (ref 2.4–5.1)
PLATELET # BLD AUTO: 233 10(3)UL (ref 150–450)
POTASSIUM SERPL-SCNC: 3.6 MMOL/L (ref 3.5–5.1)
POTASSIUM SERPL-SCNC: 3.7 MMOL/L (ref 3.5–5.1)
POTASSIUM SERPL-SCNC: 3.8 MMOL/L (ref 3.5–5.1)
POTASSIUM SERPL-SCNC: 3.8 MMOL/L (ref 3.5–5.1)
POTASSIUM SERPL-SCNC: 3.9 MMOL/L (ref 3.5–5.1)
RBC # BLD AUTO: 4.62 X10(6)UL
SODIUM SERPL-SCNC: 140 MMOL/L (ref 136–145)
SODIUM SERPL-SCNC: 141 MMOL/L (ref 136–145)
SODIUM SERPL-SCNC: 142 MMOL/L (ref 136–145)
WBC # BLD AUTO: 5.3 X10(3) UL (ref 4–11)

## 2025-03-29 PROCEDURE — 99233 SBSQ HOSP IP/OBS HIGH 50: CPT

## 2025-03-29 PROCEDURE — 99233 SBSQ HOSP IP/OBS HIGH 50: CPT | Performed by: HOSPITALIST

## 2025-03-29 RX ORDER — ATORVASTATIN CALCIUM 10 MG/1
10 TABLET, FILM COATED ORAL DAILY
Status: DISCONTINUED | OUTPATIENT
Start: 2025-03-29 | End: 2025-03-31

## 2025-03-29 RX ORDER — MAGNESIUM SULFATE HEPTAHYDRATE 40 MG/ML
2 INJECTION, SOLUTION INTRAVENOUS ONCE
Status: COMPLETED | OUTPATIENT
Start: 2025-03-29 | End: 2025-03-29

## 2025-03-29 RX ORDER — MAGNESIUM OXIDE 400 MG/1
400 TABLET ORAL ONCE
Status: COMPLETED | OUTPATIENT
Start: 2025-03-29 | End: 2025-03-29

## 2025-03-29 RX ORDER — INSULIN DEGLUDEC 100 U/ML
28 INJECTION, SOLUTION SUBCUTANEOUS DAILY
Status: DISCONTINUED | OUTPATIENT
Start: 2025-03-29 | End: 2025-03-31

## 2025-03-29 RX ORDER — POTASSIUM CHLORIDE 14.9 MG/ML
20 INJECTION INTRAVENOUS ONCE
Status: COMPLETED | OUTPATIENT
Start: 2025-03-29 | End: 2025-03-29

## 2025-03-29 NOTE — PLAN OF CARE
Rec'd report from previous RN, care assumed at 1930, pt assessed per flow sheets.  Pt drowsy, oriented x4, moves all extremities, follows commands.  Pt remains on room air, sats greater than 92%, lungs diminished throughout, some dyspnea on exertion.  Pt afebrile, SR/ST on tele, BP stable, no edema noted.  Pt NPO, remains on insulin gtt per protocol.  Pt voiding per bathroom/urinal.  Pt with PIV x3, dressings intact.  Pt ambulates with standby assistance.  Reviewed plan of care with pt, verbalized understanding.  Call light in reach.

## 2025-03-29 NOTE — PLAN OF CARE
Assumed care of patient after shift report. [facility] work to have [place] medicaid to f/u with patient Monday.

## 2025-03-29 NOTE — PROGRESS NOTES
[facility]   part of [facility]     Hospitalist Progress Note     Yusuf Alberto Patient Status:  Inpatient    2000 MRN [id number]   Location [facility] 4SW-A Attending Angelito Nielson MD   Hosp Day # 1 PCP Chiki Hernandez MD     Chief Complaint: N/V/D    Subjective:     Patient feels better today.  Some nausea but no longer vomiting.  Denies cp, sob, no fever or chills.  No other complaints.      Objective:    Review of Systems:   A comprehensive review of systems was completed; pertinent positive and negatives stated in subjective.    Vital signs:  Temp:  [97.6 °F (36.4 °C)-98.9 °F (37.2 °C)] 98.4 °F (36.9 °C)  Pulse:  [] 82  Resp:  [12-23] 15  BP: ()/(49-89) 111/53  SpO2:  [95 %-100 %] 99 %    Physical Exam:    General: No acute distress, pleasant   Respiratory: No wheezes, no rhonchi  Cardiovascular: S1, S2, regular rate and rhythm  Abdomen: Soft, Non-tender, non-distended, positive bowel sounds  Neuro: No new focal deficits.   Extremities: No edema    Diagnostic Data:    Labs:  Recent Labs   Lab 25  1358   WBC 13.0*   HGB 17.5   MCV 86.3   .0       Recent Labs   Lab 25  1358 25  0051 25  0417 25  0749   *   < > 182* 175* 193*   BUN 13   < > 10 11 11   CREATSERUM 1.50*   < > 1.39* 1.33* 1.30   CA 9.8   < > 9.0 8.9 8.9   ALB 5.4*  --   --   --   --    *   < > 140 141 141   K 4.5   < > 3.8 3.8 3.6      < > 111 112 112   CO2 <10.0*   < > 13.0* 15.0* 16.0*   ALKPHO 77  --   --   --   --    AST 28  --   --   --   --    ALT 24  --   --   --   --    BILT 0.6  --   --   --   --    TP 8.2  --   --   --   --     < > = values in this interval not displayed.       Estimated Glomerular Filtration Rate: 78 mL/min/1.73m2 (result from lab).    Recent Labs   Lab 25  1358   TROPHS <3       No results for input(s): \"PTP\", \"INR\" in the last 168 hours.               Microbiology    No results found for this visit on  03/28/25.      Imaging: Reviewed in Epic.    Medications:    atorvastatin  10 mg Oral Daily    potassium chloride  20 mEq Intravenous Once    insulin degludec  28 Units Subcutaneous Daily    insulin aspart  1-68 Units Subcutaneous TID CC    insulin aspart  1-5 Units Subcutaneous TID AC and HS    enoxaparin  40 mg Subcutaneous Nightly       Assessment & Plan:      #DKA  IV insulin  IVF  Gap closed, bicarb still low  Continue DKA mgmt per protocol      #Severe Acidosis  Due to above  Monitor  LR     #Asthma  Stable     #HLD     #JOSUE  IVF    Will need help with insulin on discharge, patient no longer has health insurance and ran out of insulin.        Nawaf Penn MD    Supplementary Documentation:     Quality:  DVT Mechanical Prophylaxis:     Early ambuation  DVT Pharmacologic Prophylaxis   Medication    enoxaparin (Lovenox) 40 MG/0.4ML SUBQ injection 40 mg                Code Status: Full Code  Diamond: No urinary catheter in place  Diamond Duration (in days):   Central line:    TREVOR:     Discharge is dependent on: DKA  At this point Mr. Alberto is expected to be discharge to: Home    The 21st Century Cures Act makes medical notes like these available to patients in the interest of transparency. Please be advised this is a medical document. Medical documents are intended to carry relevant information, facts as evident, and the clinical opinion of the practitioner. The medical note is intended as peer to peer communication and may appear blunt or direct. It is written in medical language and may contain abbreviations or verbiage that are unfamiliar.

## 2025-03-29 NOTE — PROGRESS NOTES
Yusuf Alberto Patient Status:  Inpatient    2000 MRN [id number]   Location [facility] 4SW-A Attending Angelito Nielson MD   Hosp Day # 1 PCP Chiki Hernandez MD     Critical Care Progress Note          Subjective:  No acute events overnight, remains on insulin drip.    Objective:    Medications:   enoxaparin  40 mg Subcutaneous Nightly              Intake/Output Summary (Last 24 hours) at 3/29/2025 0804  Last data filed at 3/29/2025 0635  Gross per 24 hour   Intake 1933.1 ml   Output 1500 ml   Net 433.1 ml       /64   Pulse 89   Temp 98 °F (36.7 °C) (Temporal)   Resp 17   Ht 160 cm (5' 3\")   Wt 130 lb 8.2 oz (59.2 kg)   SpO2 97%   BMI 23.12 kg/m²     Physical Exam:    General: No acute distress  Neuro: AO x3, non focal   Lungs: Clear  Cardio:  RRR  Abdomen: Soft, non tender, non distended  Extremities: Warm, no swelling     Recent Labs   Lab 25  1358   RBC 5.97*   HGB 17.5   HCT 51.5   MCV 86.3   MCH 29.3   MCHC 34.0   RDW 11.7   NEPRELIM 9.66*   WBC 13.0*   .0     Recent Labs   Lab 25  1358 25  0051 25  0417   * 199* 182* 175*   BUN 13 9 10 11   CREATSERUM 1.50* 1.27 1.39* 1.33*   CA 9.8 8.8 9.0 8.9   ALB 5.4*  --   --   --    * 134* 140 141   K 4.5 4.3 3.8 3.8    110 111 112   CO2 <10.0* <10.0* 13.0* 15.0*   ALKPHO 77  --   --   --    AST 28  --   --   --    ALT 24  --   --   --    BILT 0.6  --   --   --    TP 8.2  --   --   --      No results for input(s): \"ABGPHT\", \"KYIOOZ7N\", \"EUING2H\", \"ABGHCO3\", \"ABGBE\", \"TEMP\", \"DEBBY\", \"SITE\", \"DEV\", \"THGB\" in the last 168 hours.    Invalid input(s): \"KHG00ETJ\", \"CHOB\"  No results for input(s): \"BNP\" in the last 168 hours.  No results for input(s): \"TROP\", \"CK\" in the last 168 hours.    XR CHEST AP PORTABLE  (CPT=71045)    Result Date: 3/28/2025  CONCLUSION:  There is no evidence of active cardiopulmonary disease on this single portable chest radiograph.   LOCATION:  [facility]  by (CST): Orestes Sanches MD on 3/28/2025 at 5:07 PM     Finalized by (CST): Orestes Sanches MD on 3/28/2025 at 5:07 PM           Assessment/Plan:    Yusuf Alberto is a 24 year old male admitted to ICU for DKA.     Problems:  AGMA 2/2 DKA  DKA  Nausea/vomiting- resolved   JOSUE  Dehydration  HLD  H/o DM1  H/o asthma              Neuro: Alert and oriented x4.     Cardiovascular: NSR on tele. IVF. Resume statin     Pulmonary: No acute issues on room air. Does not appear to be in acute asthma exacerbation.     GI: Nausea/vomiting, antiemetics PRN. Lipase normal. NPO.     Renal: JOSUE likely 2/2 dehydration. Cr improving. IVF with LR to avoid hyperchloremia. Bicarb remains at 15 today, will give additional 1L IV LR. Strict I/O (1500cc urine output overnight). Avoid nephrotoxins. Daily BMP     Heme: No acute issues. Hgb, plts stable. CBC pending.      ID: Afebrile, mild leukocytosis likely reactive. Monitor off antibiotics. Daily CBC.     Endocrine: Insulin drip per DKA protocol. D5LR once blood glucose <250. Potassium repletion while on insulin drip with goal potassium 5.5. Accuchecks q1h. BMP, mg, phos q4h. Per chart review, last endocrinology visit was with Etta CONDE 12/12/2024. Orders at that time were for tresiba 28u with fast acting 1u for every 8g carbs. Inpatient DM APRN consulted. Will ask social work to assist with financial resources.        ICU checklist  Feeding: NPO  Analgesia:   Sedation: na  Thromboembolism PPX: subcutaneous lovenox, SCDs  Stress Ulcer PPX: na  Glucose control: Insulin drip  Bowel Regimen: PRN  Lines/drains/tubes: piv  Deescalation of antibiotics: na  Therapies:PT/OT/ST when appropriate  Dispo: ICU monitoring     Code Status: Full Code.  Discussed plan of care with patient at bedside. Please call Mother in case of an emergency.      Plan of care discussed with intensivist, Dr. Vidal.      Robina Hawk [facility]  Critical Care  [id number]

## 2025-03-30 LAB
ANION GAP SERPL CALC-SCNC: 9 MMOL/L (ref 0–18)
BASOPHILS # BLD AUTO: 0.02 X10(3) UL (ref 0–0.2)
BASOPHILS NFR BLD AUTO: 0.4 %
BUN BLD-MCNC: 8 MG/DL (ref 9–23)
C-PEPTIDE: 0.5 NG/ML
CALCIUM BLD-MCNC: 8.4 MG/DL (ref 8.7–10.6)
CHLORIDE SERPL-SCNC: 113 MMOL/L (ref 98–112)
CO2 SERPL-SCNC: 21 MMOL/L (ref 21–32)
CREAT BLD-MCNC: 0.91 MG/DL
EGFRCR SERPLBLD CKD-EPI 2021: 119 ML/MIN/1.73M2 (ref 60–?)
EOSINOPHIL # BLD AUTO: 0.23 X10(3) UL (ref 0–0.7)
EOSINOPHIL NFR BLD AUTO: 4.7 %
ERYTHROCYTE [DISTWIDTH] IN BLOOD BY AUTOMATED COUNT: 12.1 %
GLUCOSE BLD-MCNC: 111 MG/DL (ref 70–99)
GLUCOSE BLD-MCNC: 120 MG/DL (ref 70–99)
GLUCOSE BLD-MCNC: 121 MG/DL (ref 70–99)
GLUCOSE BLD-MCNC: 169 MG/DL (ref 70–99)
GLUCOSE BLD-MCNC: 175 MG/DL (ref 70–99)
HCT VFR BLD AUTO: 39.2 %
HGB BLD-MCNC: 13.8 G/DL
IMM GRANULOCYTES # BLD AUTO: 0 X10(3) UL (ref 0–1)
IMM GRANULOCYTES NFR BLD: 0 %
LYMPHOCYTES # BLD AUTO: 2.4 X10(3) UL (ref 1–4)
LYMPHOCYTES NFR BLD AUTO: 49.1 %
MAGNESIUM SERPL-MCNC: 2.2 MG/DL (ref 1.6–2.6)
MCH RBC QN AUTO: 29.4 PG (ref 26–34)
MCHC RBC AUTO-ENTMCNC: 35.2 G/DL (ref 31–37)
MCV RBC AUTO: 83.4 FL
MONOCYTES # BLD AUTO: 0.44 X10(3) UL (ref 0.1–1)
MONOCYTES NFR BLD AUTO: 9 %
NEUTROPHILS # BLD AUTO: 1.8 X10 (3) UL (ref 1.5–7.7)
NEUTROPHILS # BLD AUTO: 1.8 X10(3) UL (ref 1.5–7.7)
NEUTROPHILS NFR BLD AUTO: 36.8 %
OSMOLALITY SERPL CALC.SUM OF ELEC: 296 MOSM/KG (ref 275–295)
PHOSPHATE SERPL-MCNC: 2.3 MG/DL (ref 2.4–5.1)
PLATELET # BLD AUTO: 208 10(3)UL (ref 150–450)
POTASSIUM SERPL-SCNC: 3.3 MMOL/L (ref 3.5–5.1)
POTASSIUM SERPL-SCNC: 3.3 MMOL/L (ref 3.5–5.1)
RBC # BLD AUTO: 4.7 X10(6)UL
SODIUM SERPL-SCNC: 143 MMOL/L (ref 136–145)
WBC # BLD AUTO: 4.9 X10(3) UL (ref 4–11)

## 2025-03-30 PROCEDURE — 99232 SBSQ HOSP IP/OBS MODERATE 35: CPT | Performed by: HOSPITALIST

## 2025-03-30 RX ORDER — POTASSIUM CHLORIDE 14.9 MG/ML
20 INJECTION INTRAVENOUS ONCE
Status: COMPLETED | OUTPATIENT
Start: 2025-03-30 | End: 2025-03-30

## 2025-03-30 NOTE — CM/SW NOTE
03/29/25 1730   CM/SW Referral Data   Referral Source Physician   Reason for Referral Financial issues   Informant Patient   Patient Status Prior to Admission   Independent with ADLs and Mobility Yes   Discharge Needs   Anticipated D/C needs To be determined;Other  (medication resources, PAC follow up)     Patient admitted for diabetic ketoacidosis and has Type 1 diabetes.  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  Patient stated he is not sure if they offer benefits to PT employees and plans to follow up.  CM briefly discussed Medicaid and VNA resources.  CM provided patient with \"Introduction to Medicaid\" and \"Applying for Medicaid\" printed from [place] Dept of [employer] & [facility] and encouraged patient to review when feeling better.  CM called [place] [employer] and left message for follow up on Monday (not available on weekends).   Will endorse to CM/SW team for follow up.      Indy Bee RN Case Manager [phone]

## 2025-03-30 NOTE — PROGRESS NOTES
Received from ICU per bed.Alert & oriented x4.With tolerable pain.Assessment done.Ambulation in hallway encouraged.Plan of care discussed with patient.All questions and concerns addressed.

## 2025-03-30 NOTE — PLAN OF CARE
Rec'd report from previous RN, care assumed at 1930, pt assessed per flow sheets.  Pt awake, alert, oriented x4, moves all extremities.  Pt remains on room air with sats greater than 92%, lungs clear and diminished.  Pt afebrile, SR on tele, BP stable, no edema noted.  Pt tolerating carb controlled diet, insulin gtt stopped at 1945, along with IVFs.  Pt voiding per urinal.  Pt denies pain.  Pt with PIV x4, dressings clean dry and intact.  Reviewed plan of care with pt, verbalized understanding.

## 2025-03-30 NOTE — DISCHARGE INSTRUCTIONS
General Medical Follow up:  [facility] (healthcare clinic) [email or web address]  [facility] welcomes patients with Medicaid, Medicare, private insurance or no insurance at all, possibly at a discounted rate. [facility] offers low cost prescriptions drugs when seen by a [facility] physician.   [facility] [address], [place] [id number] [phone]  [facility] [address], [place] [address] [phone]  [facility] [address] [place], [place] [id number] [phone]  [facility] [address], [address] [place] [address]  Te doe ann aqui o llame al teléfono [phone] o al [phone].     [facility] provides care for chronic disease management, and offers coordinated, patient-centered care.  Their services include adult health, pediatric health, women's health, dental services, behavioral health services and LGBTQ+ health.  [facility] works to eliminate any barriers that may keep our patients from accessing services. We do this by providing 24-hour access to providers, comprehensive care management, transportation assistance, access to reduced-price pharmaceuticals, on-site laboratories, same and next-day appointments, bilingual staff and translation services.    [facility]- [address] [facility]- [address], [address], [facility]- [address] [facility]- [address] [facility] [address] [facility], [address], [facility]- [address] [facility]- [address] [facility]- [address], [facility]- [address], [facility]- [address], [place], [place]     [email or web address]  Main number [phone]    Visit [employer]Stream.Backyard for discounted  prescription drug coupons or [employer] for $4 prescriptions [email or web address]://[email or web address]    To inquire about [place] Medicaid, call [facility] [phone] or visit [email or web address]    [facility].gov - Marketplace insurance    The Extra Help Program: is designed to help eligible Medicare Part D patients with high out of pocket costs. Contact this program directly by calling [employer] at 1-152.738.7096    Through the Medicare Plan Finder, you can search to find more information about cost and your specific prescription coverage.  www.medicare.gov/find-a-plan.    [facility] Health Care Program (only apply if you do not Qualify for medicaid)  Access Fran  [address]  [place]  Phone: [phone]  [email or web address]    Fran [address] [address]  [phone]  [email or web address]/    Questions about financial assistance, application, or uninsured discounts can be directed to [phone]    To inquire about [place] Medicaid, call [facility] [phone] or visit [email or web address]

## 2025-03-30 NOTE — PROGRESS NOTES
[facility]   part of [facility]     Hospitalist Progress Note     Yusuf Alberto Patient Status:  Inpatient    2000 MRN [id number]   Location [facility] 4SW-A Attending Angelito Nielson MD   Hosp Day # 2 PCP Chiki Hernandez MD     Chief Complaint: N/V/D    Subjective:     Patient feels well today.  Denies fever, chills, n/v.  No other complaints.      Objective:    Review of Systems:   A comprehensive review of systems was completed; pertinent positive and negatives stated in subjective.    Vital signs:  Temp:  [98 °F (36.7 °C)-98.4 °F (36.9 °C)] 98.4 °F (36.9 °C)  Pulse:  [69-86] 80  Resp:  [12-22] 18  BP: (101-132)/(50-79) 116/71  SpO2:  [97 %-99 %] 98 %    Physical Exam:    General: No acute distress, pleasant   Respiratory: No wheezes, no rhonchi  Cardiovascular: S1, S2, regular rate and rhythm  Abdomen: Soft, Non-tender, non-distended, positive bowel sounds  Neuro: No new focal deficits.   Extremities: No edema    Diagnostic Data:    Labs:  Recent Labs   Lab 25  1358 25  1505 25  0506   WBC 13.0* 5.3 4.9   HGB 17.5 13.8 13.8   MCV 86.3 82.0 83.4   .0 233.0 208.0       Recent Labs   Lab 25  1358 25  1454 25  1957 25  0506   *   < > 180* 215* 120*   BUN 13   < > 10 8* 8*   CREATSERUM 1.50*   < > 1.11 1.06 0.91   CA 9.8   < > 8.5* 8.4* 8.4*   ALB 5.4*  --   --   --   --    *   < > 142 141 143   K 4.5   < > 3.9 3.7 3.3*  3.3*      < > 113* 112 113*   CO2 <10.0*   < > 19.0* 20.0* 21.0   ALKPHO 77  --   --   --   --    AST 28  --   --   --   --    ALT 24  --   --   --   --    BILT 0.6  --   --   --   --    TP 8.2  --   --   --   --     < > = values in this interval not displayed.       Estimated Glomerular Filtration Rate: 119 mL/min/1.73m2 (result from lab).    Recent Labs   Lab 25  1358   TROPHS <3       No results for input(s): \"PTP\", \"INR\" in the last 168 hours.               Microbiology    No results found  for this visit on 03/28/25.      Imaging: Reviewed in Epic.    Medications:    potassium chloride  20 mEq Intravenous Once    atorvastatin  10 mg Oral Daily    insulin degludec  28 Units Subcutaneous Daily    insulin aspart  1-68 Units Subcutaneous TID CC    insulin aspart  1-5 Units Subcutaneous TID AC and HS    enoxaparin  40 mg Subcutaneous Nightly       Assessment & Plan:      #DKA  Resolved, weaned off insulin drip  Diet advanced    #DM Type 1 - uncontrolled with hyperglycemia   Degluldec 28U  ISS  Diabetic educator consult   Patient without insurance     #Severe Acidosis  Due to above - resolved      #Asthma  Stable     #HLD  Lipitor      #JOSUE  Resolved         Nawaf Penn MD    Supplementary Documentation:     Quality:  DVT Mechanical Prophylaxis:     Early ambuation  DVT Pharmacologic Prophylaxis   Medication    enoxaparin (Lovenox) 40 MG/0.4ML SUBQ injection 40 mg                Code Status: Full Code  Diamond: No urinary catheter in place  Diamond Duration (in days):   Central line:    TREVOR:     Discharge is dependent on: DKA  At this point Mr. Alberto is expected to be discharge to: Home    The 21st Century Cures Act makes medical notes like these available to patients in the interest of transparency. Please be advised this is a medical document. Medical documents are intended to carry relevant information, facts as evident, and the clinical opinion of the practitioner. The medical note is intended as peer to peer communication and may appear blunt or direct. It is written in medical language and may contain abbreviations or verbiage that are unfamiliar.

## 2025-03-31 VITALS
RESPIRATION RATE: 18 BRPM | BODY MASS INDEX: 22.93 KG/M2 | TEMPERATURE: 98 F | HEIGHT: 63 IN | SYSTOLIC BLOOD PRESSURE: 104 MMHG | HEART RATE: 58 BPM | OXYGEN SATURATION: 99 % | WEIGHT: 129.44 LBS | DIASTOLIC BLOOD PRESSURE: 67 MMHG

## 2025-03-31 LAB
ANION GAP SERPL CALC-SCNC: 8 MMOL/L (ref 0–18)
BASOPHILS # BLD AUTO: 0.02 X10(3) UL (ref 0–0.2)
BASOPHILS NFR BLD AUTO: 0.4 %
BUN BLD-MCNC: 12 MG/DL (ref 9–23)
CALCIUM BLD-MCNC: 8.6 MG/DL (ref 8.7–10.6)
CHLORIDE SERPL-SCNC: 111 MMOL/L (ref 98–112)
CO2 SERPL-SCNC: 26 MMOL/L (ref 21–32)
CREAT BLD-MCNC: 0.76 MG/DL
EGFRCR SERPLBLD CKD-EPI 2021: 127 ML/MIN/1.73M2 (ref 60–?)
EOSINOPHIL # BLD AUTO: 0.22 X10(3) UL (ref 0–0.7)
EOSINOPHIL NFR BLD AUTO: 4 %
ERYTHROCYTE [DISTWIDTH] IN BLOOD BY AUTOMATED COUNT: 12 %
GLUCOSE BLD-MCNC: 119 MG/DL (ref 70–99)
GLUCOSE BLD-MCNC: 122 MG/DL (ref 70–99)
GLUCOSE BLD-MCNC: 133 MG/DL (ref 70–99)
HCT VFR BLD AUTO: 37.4 %
HGB BLD-MCNC: 13.5 G/DL
IMM GRANULOCYTES # BLD AUTO: 0.01 X10(3) UL (ref 0–1)
IMM GRANULOCYTES NFR BLD: 0.2 %
LYMPHOCYTES # BLD AUTO: 3.08 X10(3) UL (ref 1–4)
LYMPHOCYTES NFR BLD AUTO: 56 %
MCH RBC QN AUTO: 29.9 PG (ref 26–34)
MCHC RBC AUTO-ENTMCNC: 36.1 G/DL (ref 31–37)
MCV RBC AUTO: 82.9 FL
MONOCYTES # BLD AUTO: 0.48 X10(3) UL (ref 0.1–1)
MONOCYTES NFR BLD AUTO: 8.7 %
NEUTROPHILS # BLD AUTO: 1.69 X10 (3) UL (ref 1.5–7.7)
NEUTROPHILS # BLD AUTO: 1.69 X10(3) UL (ref 1.5–7.7)
NEUTROPHILS NFR BLD AUTO: 30.7 %
OSMOLALITY SERPL CALC.SUM OF ELEC: 301 MOSM/KG (ref 275–295)
PHOSPHATE SERPL-MCNC: 3.8 MG/DL (ref 2.4–5.1)
PLATELET # BLD AUTO: 195 10(3)UL (ref 150–450)
POTASSIUM SERPL-SCNC: 3.2 MMOL/L (ref 3.5–5.1)
POTASSIUM SERPL-SCNC: 3.2 MMOL/L (ref 3.5–5.1)
RBC # BLD AUTO: 4.51 X10(6)UL
SODIUM SERPL-SCNC: 145 MMOL/L (ref 136–145)
WBC # BLD AUTO: 5.5 X10(3) UL (ref 4–11)

## 2025-03-31 PROCEDURE — 99239 HOSP IP/OBS DSCHRG MGMT >30: CPT | Performed by: HOSPITALIST

## 2025-03-31 PROCEDURE — 99223 1ST HOSP IP/OBS HIGH 75: CPT | Performed by: CLINICAL NURSE SPECIALIST

## 2025-03-31 RX ORDER — INSULIN ASPART 100 [IU]/ML
INJECTION, SUSPENSION SUBCUTANEOUS
Qty: 30 ML | Refills: 1 | Status: SHIPPED | COMMUNITY
Start: 2025-03-31 | End: 2025-04-03 | Stop reason: CLARIF

## 2025-03-31 RX ORDER — POTASSIUM CHLORIDE 1500 MG/1
40 TABLET, EXTENDED RELEASE ORAL EVERY 4 HOURS
Status: DISCONTINUED | OUTPATIENT
Start: 2025-03-31 | End: 2025-03-31

## 2025-03-31 NOTE — CONSULTS
[facility]  Diabetes Consult Note    Yusuf Alberto Patient Status:  Inpatient    2000 MRN [id number]   Location [facility] 3NW-A Attending Nawaf Penn MD   Hosp Day # 3 PCP Chiki Hernandez MD     Reason for Consult:   Management recommendations in setting of DKA in T1DM      Provider Requesting Consult:  RAMÍREZ Liu (ICU)      Diagnosis:  Patient Active Problem List   Diagnosis    Uncontrolled type 1 diabetes mellitus with hyperglycemia (HCC)    Mixed hyperlipidemia    Type 1 diabetes mellitus with ketoacidosis without coma (HCC)    DKA (diabetic ketoacidosis) (HCC)    Metabolic acidosis         Medical History:  Past Medical History:    Asthma (HCC)    Diabetes mellitus (HCC)    Extrinsic asthma, unspecified    Seasonal allergies    Type 1 diabetes mellitus (HCC)     History reviewed. No pertinent surgical history.  Family History   Problem Relation Age of Onset    Hypertension Mother     Hypertension Maternal Grandmother     Diabetes Maternal Grandmother     Cancer Maternal Grandmother     Lipids Maternal Grandmother     Stroke Maternal Grandmother     Other (atrial flutter) Maternal Grandmother     Hypertension Maternal Grandfather     Lipids Maternal Grandfather     Cancer Maternal Grandfather          Diabetes history:  Type:  T1DM  Onset:   Family history of DM: none      Allergies: Allergies[1]      Medications: Complete list reviewed. Active diabetes medications include degludec and aspart.  Diabetic Medications               NovoLOG Mix 70/30 FlexPen 100 UNIT/ML Susp Pen-injector (Insulin Aspart Prot & Aspart 70/30) (Taking) Give 33 units Novolog 70/30 (aspart protamine-insulin aspart) insulin subcutaneously every morning and 17 units every evening.    insulin aspart 100 Units/mL Subcutaneous Solution Pen-injector (Taking) 1 U for every 40 mg/dL above 140 mg/dL for maximum 20 U daily    TRESIBA FLEXTOUCH 200 UNIT/ML Subcutaneous Solution Pen-injector Inject 28 units every  evening  (For insurance purposes only: TDD max: 34 units per day)     Patient taking differently: Inject 28 Units into the skin nightly. Inject 28 units every evening  (For insurance purposes only: TDD max: 34 units per day)    glucagon (GVOKE HYPOPEN 2-PACK) 1 MG/0.2ML Subcutaneous SUBQ injection Inject 0.2 mL (1 mg total) into the skin as needed.                Labs:  Recent Labs   Lab 03/30/25  1140 03/30/25  1636 03/30/25  2036 03/31/25  0612 03/31/25  1144   PGLU 169* 175* 111* 119* 133*     Recent Labs     03/28/25  1358 03/28/25  1527 03/29/25  0749 03/29/25  0805 03/29/25  1454 03/29/25  1508 03/29/25  1957 03/29/25  2052 03/30/25  0506 03/30/25  0719 03/31/25  0525 03/31/25  0612 03/31/25  1144   *   < > 141  --  142  --  141  --  143  --  145  --   --       < > 112  --  113*  --  112  --  113*  --  111  --   --    CO2 <10.0*   < > 16.0*  --  19.0*  --  20.0*  --  21.0  --  26.0  --   --    BUN 13   < > 11  --  10  --  8*  --  8*  --  12  --   --    CREATSERUM 1.50*   < > 1.30  --  1.11  --  1.06  --  0.91  --  0.76  --   --    A1C 15.8*  --   --   --   --   --   --   --   --   --   --   --   --    PGLU  --    < >  --    < >  --    < >  --    < >  --    < >  --    < > 133*   CA 9.8   < > 8.9  --  8.5*  --  8.4*  --  8.4*  --  8.6*  --   --    ALB 5.4*  --   --   --   --   --   --   --   --   --   --   --   --     < > = values in this interval not displayed.                    History of Present Illness: Yusuf Alberto is a 24 year old male with a PMH of T1DM c/b DKA x2 (12/2023 & 10/2024) and asthma admitted 3/28/2025 with complaints of N/V and hyperglycemia on CGM at home. ED evaluation revealed DKA (pH 7.06, , CO2 <10, A1C 15.8%).         Assessment/Recommendations:  Upon interview today, patient states he was diagnosed with T1DM in 2015 and this is his third admission with DKA. Patient states he lost his job at [employer] about 2 months and lost his insurance benefits at the end of January.  Since that time, patient has been working part-time at [employer], but does not have benefits; therefore, he has been rationing his remaining insulin pens and only taking the doses about half of the time. Explained to patient that, given A1C of 15.8% on admission, glycemic control has not been achieved for at least 3 months. Patient states his fasted AM glucose and pre-meal glucose values are usually >300. Given lack of insurance at this time, recommended patient start 70/30 insulin at discharge; explained BID dosing at breakfast and dinner of 70/30 insulin. Patient states he prefers vials as he has a surplus of insulin syringes and needles at home. Finally, recommended patient establish care at [facility] or [facility] for continued T1DM management while uninsured. Patient states understanding of and willingness to participate in plan of care.       Plan:  Inpatient recommendations -   Degludec = given within goal range fasted AM glucose, recommend continuing at 28u every day   Aspart = given within goal range AC/HS glucose values yesterday, recommend continuing at low dose sliding scale & 1:8gm CHO  Discharge recommendations -   Start 70/30 insulin (d/t lack of insurance):  AM dose = 33u (2/3 of patient's TDD)   PM dose = 17u (1/3 of patient's TDD)  Do NOT restart basal/bolus insulin at same time - can restart per outpatient provider      Prescription Recommendations:  No insurance:  Insulin:  Relion Humulin Regular 70/30 pens; Mixed Insulin handout; referral to VNA  Supplies:  Relion pen needles  Glucometer:  Relion      Provider follow up recommendations:  PCP - patient provided resources for [facility] and [facility]  Endocrinology/Diabetes Center - patient provided resources for [facility] and [facility]      A total of 60 minutes were spent with the patient, 100% was spent counseling and coordinating care for T1 diabetes self-management including nutrition, exercise, blood glucose  monitoring, insulin administration, medications, treatment options, follow-up coordination and resources.    Rut Harvey, APRN  3/31/2025  12:36 PM       [1]   Allergies  Allergen Reactions    Dust ASTHMA    Seasonal

## 2025-03-31 NOTE — PROGRESS NOTES
Patient is alert and oriented x4. Denies pain or nausea at this time. Ambulatory to void. Plan of care discussed wit patient and call light in reach.

## 2025-03-31 NOTE — CM/SW NOTE
[employer] rep Khadijah called to confirm pt will be screened for Medicaid. VM left. Per RN, pt will also contact his employer ([employer]) to discuss options for insurance plan. Resources for affordable medications, VNA clinics, the market place for insurance, and medicaid contact information available on pt's DC instructions.     Per RN, Diabetic APN discussed dc meds/coupons.     CM/SW will remain available for DC planning and/or support.     BETTY Lundberg, CMSRN    [phone]

## 2025-03-31 NOTE — PROGRESS NOTES
A&Ox4. VSS. RA. .  Telemetry: NSR  GI: Abdomen soft, nondistended. Passing gas.  Denies nausea.  : Voids.  Denies pain   Up independently   Diet: Tolerating carb controlled diet- carb coverage insulin given per MAR   Saline locked   All appropriate safety measures in place. All questions and concerns addressed.

## 2025-03-31 NOTE — DISCHARGE SUMMARY
[facility]OSPITAL[facility]  DISCHARGE SUMMARY     Yusuf Alberto Patient Status:  Inpatient    2000 MRN [id number]   Location [facility] 3NW-A Attending Nawaf Penn MD   Hosp Day # 3 PCP Chiki Hernandez MD     Date of Admission: 3/28/2025  Date of Discharge:   3/31/2025    Discharge Disposition: Home or Self Care    Discharge Diagnosis:    #DKA  #DM Type 1 - uncontrolled with hyperglycemia   #Severe Acidosis  #Asthma  #HLD  #JOSUE    History of Present Illness: Yusuf Alberto is a 24 year old male with a past medical history of DM1, asthma.  He has greyson non compliant with his insulin and started to have nausea/vomiting and dyspnea.  He came to the ED and is noted to be in DKA.    Brief Synopsis:   Patient presented with n/v due to DKA from uncontrolled DM.  Patient without insulin use recently given he lost his insurance.  He responded to usual care of DKA.  He will be discharged home with insulin 70/30 and f/u with VNA clinic until he can establish insurance again.  All questions addressed with patient prior to discharge.  Cleared by all services.  F/u with pcp recommended.      Lace+ Score: 62  59-90 High Risk  29-58 Medium Risk  0-28   Low Risk  Patient was referred to [facility].    TCM Follow-Up Recommendation:  LACE > 58: High Risk of readmission after discharge from the hospital.      Procedures during hospitalization:   None    Consultants:  ICU, Diabetic APRN    Discharge Medication List:     Discharge Medications        START taking these medications        Instructions Prescription details   NovoLOG Mix 70/30 FlexPen  Generic drug: insulin aspart prot & aspart 70/30      Give 33 units Novolog 70/30 (aspart protamine-insulin aspart) insulin subcutaneously every morning and 17 units every evening.   Quantity: 30 mL  Refills: 1            CONTINUE taking these medications        Instructions Prescription details   atorvastatin 10 MG Tabs  Commonly known as: Lipitor      Take 1  tablet (10 mg total) by mouth daily.   Quantity: 90 tablet  Refills: 1     Dexcom G6 Transmitter Misc      1 each every 3 (three) months.   Quantity: 1 each  Refills: 3     Dexcom G7 Sensor Misc      1 each Every 10 days.   Quantity: 9 each  Refills: 1     Gvoke HypoPen 2-Pack 1 MG/0.2ML injection  Generic drug: glucagon      Inject 0.2 mL (1 mg total) into the skin as needed.   Quantity: 0.4 mL  Refills: 1     OneTouch Delica Lancets 33G Misc      Test blood sugar 3 times per day.   Quantity: 100 each  Refills: 6     OneTouch Verio Strp      Test blood sugar 3 times per day.   Quantity: 100 strip  Refills: 6            STOP taking these medications      insulin aspart 100 Units/mL Sopn  Commonly known as: NovoLOG        Tresiba FlexTouch 200 UNIT/ML Sopn  Generic drug: insulin degludec                  Where to Get Your Medications        Please  your prescriptions at the location directed by your doctor or nurse    Bring a paper prescription for each of these medications  NovoLOG Mix 70/30 FlexPen         [place]    Follow-up appointment:   [facility]  [address]  [address]  [facility] [address] [phone]  Schedule an appointment as soon as possible for a visit      Chiki Hernandez MD  [address] [facility] [address]  [place] [address]  [phone]    Schedule an appointment as soon as possible for a visit in 1 week(s)      Phyllis Gutierrez APRN  [address]   [address]  [facility] [address]  [phone]    Schedule an appointment as soon as possible for a visit in 2 week(s)      Appointments for Next 30 Days 3/31/2025 - 4/30/2025        Date Arrival Time Visit Type Length Department Provider     4/3/2025  3:00 PM  NON[facility] HOSPITAL FOLLOW UP [5060] 60 min [facility] Anila Iraheta APRN    Patient Instructions:         Location Instructions:     Masks are optional for all patients and visitors, unless otherwise indicated. Note: A $50 fee will be charged for  missed appointments or same-day cancellations. Please provide 24 hours' notice if you need to cancel or reschedule your appointment.                      Vital signs:  Temp:  [97.8 °F (36.6 °C)-99.1 °F (37.3 °C)] 97.8 °F (36.6 °C)  Pulse:  [58-83] 58  Resp:  [18] 18  BP: (104-131)/(67-87) 104/67  SpO2:  [98 %-100 %] 99 %    Physical Exam:    General: No acute distress, pleasant   Respiratory: No wheezes, no rhonchi  Cardiovascular: S1, S2, regular rate and rhythm  Abdomen: Soft, Non-tender, non-distended, positive bowel sounds  Neuro: No new focal deficits.   Extremities: No edema    -----------------------------------------------------------------------------------------------  PATIENT DISCHARGE INSTRUCTIONS: See electronic chart    Nawaf Penn MD    Total time spent on discharge plannin minutes     The  [place] Cures Act makes medical notes like these available to patients in the interest of transparency. Please be advised this is a medical document. Medical documents are intended to carry relevant information, facts as evident, and the clinical opinion of the practitioner. The medical note is intended as peer to peer communication and may appear blunt or direct. It is written in medical language and may contain abbreviations or verbiage that are unfamiliar.

## 2025-04-01 ENCOUNTER — TELEPHONE (OUTPATIENT)
Facility: CLINIC | Age: 25
End: 2025-04-01

## 2025-04-01 LAB
B-HYDROXYBUTYRATE: 9.7 MMOL/L
B-HYDROXYBUTYRATE: 9.7 MMOL/L

## 2025-04-01 NOTE — TELEPHONE ENCOUNTER
Patient has no my chart.  His Last office visit was 11/7/24  Pls call patient to schedule a hospital follow up after he was discharged last 3/31/25 due to Type 1 DM DKA.   Noted no future appt with me or Endo. Bharath.

## 2025-04-03 ENCOUNTER — OFFICE VISIT (OUTPATIENT)
Dept: INTERNAL MEDICINE CLINIC | Facility: CLINIC | Age: 25
End: 2025-04-03

## 2025-04-03 VITALS
WEIGHT: 138 LBS | BODY MASS INDEX: 24.45 KG/M2 | OXYGEN SATURATION: 99 % | HEART RATE: 110 BPM | HEIGHT: 63 IN | SYSTOLIC BLOOD PRESSURE: 104 MMHG | DIASTOLIC BLOOD PRESSURE: 72 MMHG | TEMPERATURE: 98 F | RESPIRATION RATE: 14 BRPM

## 2025-04-03 DIAGNOSIS — N17.9 AKI (ACUTE KIDNEY INJURY): ICD-10-CM

## 2025-04-03 DIAGNOSIS — E78.2 MIXED HYPERLIPIDEMIA: ICD-10-CM

## 2025-04-03 DIAGNOSIS — E10.10 DIABETIC KETOACIDOSIS WITHOUT COMA ASSOCIATED WITH TYPE 1 DIABETES MELLITUS (HCC): ICD-10-CM

## 2025-04-03 DIAGNOSIS — E10.65 UNCONTROLLED TYPE 1 DIABETES MELLITUS WITH HYPERGLYCEMIA (HCC): Primary | ICD-10-CM

## 2025-04-03 DIAGNOSIS — E87.20 METABOLIC ACIDOSIS: ICD-10-CM

## 2025-04-03 RX ORDER — INSULIN ASPART 100 [IU]/ML
INJECTION, SOLUTION INTRAVENOUS; SUBCUTANEOUS
Qty: 15 ML | Refills: 0 | Status: SHIPPED | OUTPATIENT
Start: 2025-04-03

## 2025-04-03 RX ORDER — INSULIN DEGLUDEC 200 U/ML
25 INJECTION, SOLUTION SUBCUTANEOUS NIGHTLY
Qty: 15 ML | Refills: 0 | Status: SHIPPED | OUTPATIENT
Start: 2025-04-03

## 2025-04-03 NOTE — PROGRESS NOTES
TCM VISIT  [facility] TRANSITIONAL CARE CLINIC      HISTORY   HPI: Yusuf Alberto is a 24 year old male here today for hospital follow up for uncontrolled DM 1, DKA, metabolic acidosis, JOSUE, hyperlipidemia.  Yusuf Alberto was discharged from [facility]  to Home or Self Care.  Admit Date: 3/28/25. Discharge Date: 3/31/25.     Hospital Discharge Diagnosis:        #DKA  #DM Type 1 - uncontrolled with hyperglycemia   #Severe Acidosis  #Asthma  #HLD  #JOSUE    Hospital stay was uncomplicated.  Yusuf Alberto was discharge with 70/30 insulin and a referral to the [facility] for continued follow up.      Today, patient is being seen for hospital follow-up.  He reports doing okay since discharge.    He presented to ED with nausea, vomiting, dyspnea.  He reports he has been noncompliant with his insulin due to financial issues.  In ED he was noted to be in DKA.  He reports being without insulin use recently given he lost his insurance.  He was admitted to ICU and treated with DKA protocol with aggressive IV hydration, electrolyte replacement, insulin drip, antiemetics as needed.  He improved and was seen by endocrinology and recommended to be discharged home on 70/30 insulin and follow-up with [facility] clinic until he can establish insurance again.  He was cleared by all consultants and discharged home in stable condition.    Interactive contact within 2 business days post discharge first initiated on Date: 4/3/2025      Allergies:  Allergies[1]   Current Meds:  Current Outpatient Medications   Medication Sig Dispense Refill    NovoLOG Mix 70/30 FlexPen 100 UNIT/ML Susp Pen-injector (Insulin Aspart Prot & Aspart 70/30) Give 33 units Novolog 70/30 (aspart protamine-insulin aspart) insulin subcutaneously every morning and 17 units every evening. 30 mL 1    atorvastatin (LIPITOR) 10 MG Oral Tab Take 1 tablet (10 mg total) by mouth daily. 90 tablet 1    Continuous Glucose Sensor (DEXCOM G7 SENSOR) Does not apply Misc 1 each Every  10 days. 9 each 1    Glucose Blood (ONETOUCH VERIO) In Vitro Strip Test blood sugar 3 times per day. 100 strip 6    OneTouch Delica Lancets 33G Does not apply Misc Test blood sugar 3 times per day. 100 each 6    Continuous Blood Gluc Transmit (DEXCOM G6 TRANSMITTER) Does not apply Misc 1 each every 3 (three) months. 1 each 3    glucagon (GVOKE HYPOPEN 2-PACK) 1 MG/0.2ML Subcutaneous SUBQ injection Inject 0.2 mL (1 mg total) into the skin as needed. 0.4 mL 1     No current facility-administered medications for this visit.       HISTORY: reconciled and reviewed with patient  Past Medical History:    Asthma (HCC)    Diabetes mellitus (HCC)    Extrinsic asthma, unspecified    Seasonal allergies    Type 1 diabetes mellitus (HCC)      No past surgical history on file.   Family History   Problem Relation Age of Onset    Hypertension Mother     Hypertension Maternal Grandmother     Diabetes Maternal Grandmother     Cancer Maternal Grandmother     Lipids Maternal Grandmother     Stroke Maternal Grandmother     Other (atrial flutter) Maternal Grandmother     Hypertension Maternal Grandfather     Lipids Maternal Grandfather     Cancer Maternal Grandfather       Social History     Socioeconomic History    Marital status: Single   Tobacco Use    Smoking status: Never     Passive exposure: Never    Smokeless tobacco: Never   Vaping Use    Vaping status: Never Used   Substance and Sexual Activity    Alcohol use: Not Currently     Alcohol/week: 4.0 standard drinks of alcohol     Types: 4 Glasses of wine per week    Drug use: Never     Social Drivers of Health     Food Insecurity: No Food Insecurity (3/28/2025)    NCSS - Food Insecurity     Worried About Running Out of Food in the Last Year: No     Ran Out of Food in the Last Year: No   Transportation Needs: No Transportation Needs (3/28/2025)    NCSS - Transportation     Lack of Transportation: No   Housing Stability: Not At Risk (3/28/2025)    NCSS - Housing/Utilities     Has  Housing: Yes     Worried About Losing Housing: No     Unable to Get Utilities: No        Imaging & Consults:        Lab Results   Component Value Date/Time    WBC 5.5 03/31/2025 05:25 AM    HGB 13.5 03/31/2025 05:25 AM    HCT 37.4 (L) 03/31/2025 05:25 AM    .0 03/31/2025 05:25 AM     (H) 03/31/2025 05:25 AM     03/31/2025 05:25 AM    K 3.2 (L) 03/31/2025 05:25 AM    K 3.2 (L) 03/31/2025 05:25 AM     03/31/2025 05:25 AM    CO2 26.0 03/31/2025 05:25 AM    BUN 12 03/31/2025 05:25 AM    CREATSERUM 0.76 03/31/2025 05:25 AM    CA 8.6 (L) 03/31/2025 05:25 AM    MG 2.2 03/30/2025 05:06 AM    PHOS 3.8 03/31/2025 05:25 AM    ALB 5.4 (H) 03/28/2025 01:58 PM    ALT 24 03/28/2025 01:58 PM    AST 28 03/28/2025 01:58 PM    A1C 15.8 (H) 03/28/2025 01:58 PM       Immunization History   Administered Date(s) Administered    Covid-19 Vaccine Pfizer 30 mcg/0.3 ml 02/04/2021, 05/27/2021    DTAP 10/06/1998, 12/04/1998, 02/09/1999, 02/04/2000    HEP B, Ped/Adol 10/06/1998, 12/04/1998, 05/05/1999    Hib, Unspecified Formulation 10/06/1998, 12/04/1998, 02/09/1999, 02/04/2000    Hpv Virus Vaccine 9 Lisa Im 07/29/2016, 05/11/2023    IPV 10/06/1998, 12/04/1998, 02/04/2000    Influenza 10/23/2006, 10/26/2009    Influenza Vaccine, trivalent (IIV3), PF 0.5mL (98378) 10/30/2024    MMR 11/05/1999    Meningococcal-Menactra 09/09/2015    Meningococcal-Menveo 2month-55yr 07/28/2012    Pneumococcal Conjugate PCV20 05/11/2023    TDAP 07/28/2012, 04/17/2022    Varicella 07/28/2012       ROS:  GENERAL: energy stable, denies fever, weakness  SKIN: denies any skin changes  EYES: denies blurred vision, eye pain  HEENT: denies ear pain, loss of hearing, sore throat  RESPIRATORY: denies cough, denies dyspnea with exertion  CARDIOVASCULAR: denies syncope, chest pain, fatigue, + mild palpitations   GI: denies abdominal pain, melena, constipation, + diarrhea x 1, denies nausea, vomiting   MUSCULOSKELETAL: denies pain, states normal range  of motion of extremities  NEUROLOGIC: denies confusion, balance difficulty  PSYCHIATRIC: denies depression or anxiety  HEMATOLOGIC: denies history of anemia, bruising, bleeding    PHYSICAL EXAM:  Vitals:    04/03/25 1457   BP: 104/72   Pulse: 110   Resp: 14   Temp: 97.8 °F (36.6 °C)       GENERAL: well developed, well nourished, in no apparent distress, good historian  INTEGUMENTARY: warm, dry, no rashes  HEENT: atraumatic, normocephalic, sclera white, conjunctivae pink  NECK: supple  CHEST: no chest tenderness  LUNGS: clear to auscultation bilaterally, no wheezes, rhonchi or rales, normal respiratory effort  CARDIO: S1, S2, RRR without audible murmur, + tachycardia  GI: + BS to all quadrants, no tenderness  MUSCULOSKELETAL: + ROM in all joints, no evidence of swelling, pain   EXTREMITIES: no cyanosis, or edema  NEURO: Oriented x3  PSYCHIATRIC: appropriate affect    ASSESSMENT/ PLAN:   1. Uncontrolled type 1 diabetes mellitus with hyperglycemia/DKA associated with DM 1/metabolic acidosis  Was without insulin/rationing insulin secondary to loss of insurance due to loss of job-has started new job but insurance is not effective yet  Was treated in ICU for DKA with aggressive IV hydration, insulin drip, electrolyte replacement, and antiemetics as needed  Was seen by endocrinology APN and started on 70/30 insulin at discharge  Patient reports not being able to  insulin as he does not have available funds to purchase at this time  Pharmacist counseled patient and provided savings card for long and short acting insulin for $35 a month-provided patient with funds to purchase insulin  He reports he has multiple meters at home to use and was instructed to start checking BS 3 times daily before meals and keep log and bring with to all follow-ups for review  Rx sent for/start  insulin aspart (NOVOLOG FLEXPEN) 100 Units/mL Subcutaneous Solution Pen-injector; Inject 1unit for every 10g of carbs plus a correction of 1unit  for every 40 points greater than 140mg/dl.  Max daily dose of 35units.  Dispense: 15 mL; Refill: 0  insulin degludec (TRESIBA FLEXTOUCH) 200 UNIT/ML Subcutaneous Solution Pen-injector; Inject 25 Units into the skin nightly.  Dispense: 15 mL; Refill: 0  Stop  70/30 insulin  Patient is to follow-up with [facility] on 4/10 at 3:30 PM with BS log to make changes if needed to long-acting/short acting insulin  Not consuming a lot of carbs due to not having insulin-discussed he needs to have 45-60 g of carbs per meal with protein and 15 g of carbs with snacks also with a protein  Would benefit from DM education repeat in the future  Continue  Atorvastatin 10 mg nightly  Not on ACE/ARB  Follow-up with [facility] on 4/10 at 3:30 PM with BS log  Needs to establish with PCP-[facility] liaison to contact patient to help establish with PCP in 4 weeks-if he receives insurance before then our office can assist with setting up with an [place] PCP  Continue to monitor for S/S of hyper hypoglycemia  Further A1c monitoring per PCP  Unable to follow-up with endocrinology at this time due to insurance and financial issues  All pertinent hospital records, labs, radiology from current hospitalization reviewed    2. JOSUE (acute kidney injury)  Resolved  JOSUE on admission  BUN 13  Creatinine 1.50  GFR 66  Treated with aggressive hydration  Kidney function on 3/31 prior to discharge  BUN 12  Creatinine 0.76    Continue to monitor labs as directed    3. Mixed hyperlipidemia  Lipid panel drawn in the hospital  Total cholesterol 250  HDL 38  Triglycerides 308    Continue  Atorvastatin 10 mg nightly  Lipid panel was discussed in great detail with patient by pharmacist and NP  Discussed goal LDL is less than 100 and ideally less than 70 and with DM should be 55 or less to prevent cardiovascular and cerebrovascular disease  Further lipid panel monitoring per PCP      Orders Placed This Encounter    insulin aspart (NOVOLOG FLEXPEN) 100 Units/mL  Subcutaneous Solution Pen-injector     Sig: Inject 1unit for every 10g of carbs plus a correction of 1unit for every 40 points greater than 140mg/dl.  Max daily dose of 35units.     Dispense:  15 mL     Refill:  0     Patient to bring in MyInsulin RX card.  This will replace the Novolog 70/30 prescription from 3/31    insulin degludec (TRESIBA FLEXTOUCH) 200 UNIT/ML Subcutaneous Solution Pen-injector     Sig: Inject 25 Units into the skin nightly.     Dispense:  15 mL     Refill:  0           Medications & Refills for this Visit:   insulin aspart (NOVOLOG FLEXPEN) 100 Units/mL Subcutaneous Solution Pen-injector Inject 1unit for every 10g of carbs plus a correction of 1unit for every 40 points greater than 140mg/dl.  Max daily dose of 35units. 15 mL 0    insulin degludec (TRESIBA FLEXTOUCH) 200 UNIT/ML Subcutaneous Solution Pen-injector Inject 25 Units into the skin nightly. 15 mL 0    atorvastatin (LIPITOR) 10 MG Oral Tab Take 1 tablet (10 mg total) by mouth daily. 90 tablet 1    Glucose Blood (ONETOUCH VERIO) In Vitro Strip Test blood sugar 3 times per day. 100 strip 6     Requested Prescriptions     Signed Prescriptions Disp Refills    insulin aspart (NOVOLOG FLEXPEN) 100 Units/mL Subcutaneous Solution Pen-injector 15 mL 0     Sig: Inject 1unit for every 10g of carbs plus a correction of 1unit for every 40 points greater than 140mg/dl.  Max daily dose of 35units.    insulin degludec (TRESIBA FLEXTOUCH) 200 UNIT/ML Subcutaneous Solution Pen-injector 15 mL 0     Sig: Inject 25 Units into the skin nightly.         Health Maintenance:  Health Maintenance   Topic Date Due    Diabetes Care Dilated Eye Exam  Never done    HPV Vaccines (3 - Male 3-dose series) 08/03/2023    COVID-19 Vaccine (3 - 2024-25 season) 09/01/2024    Diabetes Care: Foot Exam (Annual)  01/01/2025    Diabetes Care: Microalb/Creat Ratio (Annual)  01/01/2025    Annual Physical  05/06/2025    Diabetes Care A1C  06/28/2025    Diabetes Care: GFR   03/31/2026    DTaP,Tdap,and Td Vaccines (7 - Td or Tdap) 04/17/2032    Influenza Vaccine  Completed    Annual Depression Screening  Completed    Pneumococcal Vaccine: Birth to 50yrs  Completed    Meningococcal B Vaccine  Aged Out       Chronic Care Management Referral: N/A      Transitional Care Management Certification:  During the visit, I accessed Epic and/or Care Everywhere and personally reviewed the following for the recent hospitalization: provider notes, consults, summaries, labs and other test results and the pertinent findings are documented below.     Medication Reconciliation:  I am aware of an inpatient discharge within the last 30 days.  The discharge medication list has been reconciled with the patient's current medication list and reviewed by me.  See medication list for additions of new medication, and changes to current doses of medications and discontinued medications.        Discharge Disposition/Plan:     Future Appointments   Date Time Provider Department Center   4/17/2025  3:30 PM Anila Iraheta APRN [facility]     1.  Transitional Care Clinic Visit: Next visit 4/10/2025  2.  PCP Visit: [facility] liaison to contact patient to establish with PCP  3.  Chronic Care Management: N/A     AMAYA Perez, 4/3/2025  [facility]  [phone]         [1]   Allergies  Allergen Reactions    Dust ASTHMA    Seasonal

## 2025-04-03 NOTE — PATIENT INSTRUCTIONS
PATIENT INSTRUCTIONS:      Contact the A for low cost [facility]: [phone]

## 2025-04-03 NOTE — PROGRESS NOTES
TRANSITIONAL CARE CLINIC PHARMACIST MEDICATION RECONCILIATION        Yusuf Alberto MRN [id number]    2000 PCP Chiki Hrenandez MD       Comments: Medication history completed by the Transitional Care Clinic Pharmacist with the patient in the office.     The following medication changes occurred while patient was hospitalized:  Medications Started:  Novolog 70/30 mix FlexPen - Inject 33 units every morning and 17 units every evening    Medications Stopped:  Insulin Aspart 100unit/ml soln  Tresiba FlexTouch 100unit/ml soln    Outpatient Encounter Medications as of 4/3/2025   Medication Sig    atorvastatin (LIPITOR) 10 MG Oral Tab Take 1 tablet (10 mg total) by mouth daily.    Glucose Blood (ONETOUCH VERIO) In Vitro Strip Test blood sugar 3 times per day.    glucagon (GVOKE HYPOPEN 2-PACK) 1 MG/0.2ML Subcutaneous SUBQ injection Inject 0.2 mL (1 mg total) into the skin as needed.     Medication Adherence Assessment:   Patient reports he has not been able to  the Novolog 70/30 insulin yet due to the cost.  States he is still using whatever insulin he has at home for his blood sugars.  States his blood sugar this morning was 96.  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  Patient states he is waiting for Medicaid to get approved.    Discussed with the patient the MyInsulin Rx program, and the ability to get the Tresiba and Novolog insulin for $35/month.  Provided the patient with the MyInsulin Rx card and $40 to purchase the insulin today from the pharmacy.  Patient reports having plenty of pen needles at home, and has a couple of meters with supplies that are in date for testing.  Recommended as soon as he receives the approval for Medicaid to re-establish with Endocrinology for further management.    Reviewed all medications in detail with patient including dose, indication, timing of administration, monitoring  parameters, and potential side effects of medications.   Patient confirmed understanding.     Thank you,    Tere Fox, PharmD, 4/3/2025, 3:30 PM  Trans[facility] Clinic

## 2025-04-10 ENCOUNTER — OFFICE VISIT (OUTPATIENT)
Dept: INTERNAL MEDICINE CLINIC | Facility: CLINIC | Age: 25
End: 2025-04-10

## 2025-04-10 DIAGNOSIS — E10.10 TYPE 1 DIABETES MELLITUS WITH KETOACIDOSIS WITHOUT COMA (HCC): Primary | ICD-10-CM

## 2025-04-10 LAB
GLUCOSE BLOOD: 281
TEST STRIP LOT #: NORMAL NUMERIC

## 2025-04-10 PROCEDURE — 99214 OFFICE O/P EST MOD 30 MIN: CPT | Performed by: NURSE PRACTITIONER

## 2025-04-10 PROCEDURE — 82947 ASSAY GLUCOSE BLOOD QUANT: CPT | Performed by: NURSE PRACTITIONER

## 2025-04-17 ENCOUNTER — OFFICE VISIT (OUTPATIENT)
Dept: INTERNAL MEDICINE CLINIC | Facility: CLINIC | Age: 25
End: 2025-04-17

## 2025-04-17 VITALS
RESPIRATION RATE: 98 BRPM | HEART RATE: 100 BPM | TEMPERATURE: 98 F | DIASTOLIC BLOOD PRESSURE: 64 MMHG | SYSTOLIC BLOOD PRESSURE: 110 MMHG

## 2025-04-17 DIAGNOSIS — E10.65 UNCONTROLLED TYPE 1 DIABETES MELLITUS WITH HYPERGLYCEMIA (HCC): Primary | ICD-10-CM

## 2025-04-17 NOTE — PROGRESS NOTES
TRANSITIONAL CARE CLINIC FOLLOW-UP VISIT     Yusuf Alberto MRN [id number]    2000 PCP Chiki Hernandez MD   Admit Date: 3/28/25  Discharge Date: 3/31/25  Hospital Discharge Diagnosis:      #DKA  #DM Type 1 - uncontrolled with hyperglycemia   #Severe Acidosis  #Asthma  #HLD  #JOSUE       CHIEF COMPLAINT: Follow-up uncontrolled type 1 diabetes.      HPI: Today, patient is being seen for follow-up of uncontrolled DM type I. He was to be checking his BS 3 times daily and was to bring log to follow-up today for review to see if any changes need to be made to his medications.  He had full BS log at today's visit with a few missing readings.  He has been setting an alarm to remind him to check his BS.  He was also asked to write down how much bolus/correctional insulin he is giving himself for review and is to return on  to review again.  He is to continue his NovoLog with sliding scale and correctional scale and Tresiba 25 units nightly.  No further questions at today's exam     Current Meds:  Current Outpatient Medications   Medication Sig Dispense Refill    insulin aspart (NOVOLOG FLEXPEN) 100 Units/mL Subcutaneous Solution Pen-injector Inject 1unit for every 10g of carbs plus a correction of 1unit for every 40 points greater than 140mg/dl.  Max daily dose of 35units. 15 mL 0    insulin degludec (TRESIBA FLEXTOUCH) 200 UNIT/ML Subcutaneous Solution Pen-injector Inject 25 Units into the skin nightly. 15 mL 0    atorvastatin (LIPITOR) 10 MG Oral Tab Take 1 tablet (10 mg total) by mouth daily. 90 tablet 1    Glucose Blood (ONETOUCH VERIO) In Vitro Strip Test blood sugar 3 times per day. 100 strip 6    glucagon (GVOKE HYPOPEN 2-PACK) 1 MG/0.2ML Subcutaneous SUBQ injection Inject 0.2 mL (1 mg total) into the skin as needed. 0.4 mL 1     No current facility-administered medications for this visit.        Lab Results   Component Value Date/Time    WBC 5.5 2025 05:25 AM    HGB 13.5 2025 05:25 AM    HCT  37.4 (L) 03/31/2025 05:25 AM    .0 03/31/2025 05:25 AM     (H) 03/31/2025 05:25 AM     03/31/2025 05:25 AM    K 3.2 (L) 03/31/2025 05:25 AM    K 3.2 (L) 03/31/2025 05:25 AM     03/31/2025 05:25 AM    CO2 26.0 03/31/2025 05:25 AM    BUN 12 03/31/2025 05:25 AM    CREATSERUM 0.76 03/31/2025 05:25 AM    CA 8.6 (L) 03/31/2025 05:25 AM    MG 2.2 03/30/2025 05:06 AM    PHOS 3.8 03/31/2025 05:25 AM    ALB 5.4 (H) 03/28/2025 01:58 PM    ALT 24 03/28/2025 01:58 PM    AST 28 03/28/2025 01:58 PM    A1C 15.8 (H) 03/28/2025 01:58 PM       ROS:  GENERAL: energy stable, denies fever, weakness  SKIN: denies any skin changes  EYES: denies blurred vision, eye pain  HEENT: denies ear pain, loss of hearing, sore throat  RESPIRATORY: denies cough, denies dyspnea with exertion  CARDIOVASCULAR: denies syncope, chest pain, fatigue, + mild palpitations   GI: denies abdominal pain, melena, constipation, diarrhea, denies nausea, vomiting   MUSCULOSKELETAL: denies pain, states normal range of motion of extremities  NEUROLOGIC: denies confusion, balance difficulty  PSYCHIATRIC: denies depression or anxiety  HEMATOLOGIC: denies history of anemia, bruising, bleeding    PHYSICAL EXAM:  /64   Pulse 100   Temp 98.1 °F (36.7 °C)   Resp (!) 98    GENERAL: well developed, well nourished, in no apparent distress, good historian  INTEGUMENTARY: warm, dry, no rashes  HEENT: atraumatic, normocephalic, sclera white, conjunctivae pink  NECK: supple  CHEST: no chest tenderness  LUNGS: clear to auscultation bilaterally, no wheezes, rhonchi or rales, normal respiratory effort  CARDIO: S1, S2, RRR without audible murmur, + tachycardia  GI: + BS to all quadrants, no tenderness  MUSCULOSKELETAL: + ROM in all joints, no evidence of swelling, pain   EXTREMITIES: no cyanosis, or edema  NEURO: Oriented x3  PSYCHIATRIC: appropriate affect      IMPRESSION/ PLAN:   Diagnoses and all orders for this visit:    Uncontrolled type 1 diabetes  mellitus with hyperglycemia (HCC)  Was without insulin/rationing insulin secondary to loss of insurance due to loss of job-has started new job but insurance is not effective yet-as of initial hospitalization  Was treated in ICU for DKA with aggressive IV hydration, insulin drip, electrolyte replacement, and antiemetics as needed  Picked up short acting/long-acting insulin with coupon-is good for 1 year and has Medicaid pending at this time  Pharmacist counseled patient and provided savings card for long and short acting insulin for $35 a month  He reports he has multiple meters at home to use and was instructed to start checking BS 3 times daily before meals and keep log and bring with to all follow-ups for review  BS log reviewed and in morning fasting is running 208-297 with 1 reading of 81, before lunch is running 107-259 with 3 low readings of 78, 54, and 85, and before dinner is running 189-273 with 2 low readings of 99 and 80  Patient appears to be a brittle diabetic with significant highs and significant lows and is not likely following a diabetic diet very closely  Reviewed insulin amounts given and had long discussion with patient about carb correction and sliding scale  Still not able to make any medication changes as has had hypoglycemic readings during the past week-discussed needs to establish with endocrinology and have CGM placed and started on an insulin pump for better coverage  Continue  insulin aspart (NOVOLOG FLEXPEN) 100 Units/mL Subcutaneous Solution Pen-injector; Inject 1unit for every 10g of carbs plus a correction of 1unit for every 40 points greater than 140mg/dl.  Max daily dose of 35units.  Dispense: 15 mL; Refill: 0  insulin degludec (TRESIBA FLEXTOUCH) 200 UNIT/ML Subcutaneous Solution Pen-injector; Inject 25 Units into the skin nightly.  Dispense: 15 mL; Refill: 0  Stop  70/30 insulin  Patient is to follow-up with [facility] on 4/24 at 3:30 PM with BS log to make changes if needed to  long-acting/short acting insulin  Has restarted eating carbs along with high-protein  Would benefit from DM education repeat in the future  Continue  Atorvastatin 10 mg nightly  Not on ACE/ARB  Follow-up with [facility] on 4/124 at 3:30 PM with BS log  Needs to establish with PCP-[facility] liaison to contact patient to help establish with PCP in 4 weeks-if he receives insurance before then our office can assist with setting up with an [place] PCP  Continue to monitor for S/S of hyper hypoglycemia  Further A1c monitoring per PCP  Unable to follow-up with endocrinology at this time due to insurance and financial issues-patient is waiting for decision for Medicaid pending and was instructed if he receives any communication from Medicaid to bring with to next follow-up for review      No orders of the defined types were placed in this encounter.      Vaccines: N/A    Current Meds & Refills for this Visit:  Current Meds:  Medications Ordered Prior to this Encounter[1]      Refills:  Requested Prescriptions      No prescriptions requested or ordered in this encounter         Chronic Care Management Referral: N/A    Future Appointments   Date Time Provider Department Center   5/1/2025  3:30 PM Anila Iraheta APRN [facility]     Discharge Disposition:  1.  [facility] Follow-Up Visit: 4/24/2025  2.  Scheduled PCP Visit: Needs to establish with [facility]   3.  Home Health Care: N/A      AMAYA Perez, 4/16/2025   [facility]  [phone]         [1]   Current Outpatient Medications on File Prior to Visit   Medication Sig    insulin aspart (NOVOLOG FLEXPEN) 100 Units/mL Subcutaneous Solution Pen-injector Inject 1unit for every 10g of carbs plus a correction of 1unit for every 40 points greater than 140mg/dl.  Max daily dose of 35units.    insulin degludec (TRESIBA FLEXTOUCH) 200 UNIT/ML Subcutaneous Solution Pen-injector Inject 25 Units into the skin nightly.    atorvastatin (LIPITOR) 10 MG Oral Tab Take 1 tablet (10 mg  total) by mouth daily.    Glucose Blood (ONETOUCH VERIO) In Vitro Strip Test blood sugar 3 times per day.    glucagon (GVOKE HYPOPEN 2-PACK) 1 MG/0.2ML Subcutaneous SUBQ injection Inject 0.2 mL (1 mg total) into the skin as needed.     No current facility-administered medications on file prior to visit.

## 2025-04-20 VITALS — HEART RATE: 100 BPM | DIASTOLIC BLOOD PRESSURE: 78 MMHG | SYSTOLIC BLOOD PRESSURE: 120 MMHG

## 2025-04-20 NOTE — PROGRESS NOTES
TRANSITIONAL CARE CLINIC FOLLOW-UP VISIT     Yusuf Alberto MRN [id number]    2000 PCP Chiki Hernandez MD   Admit Date: 3/28/25  Discharge Date: 3/31/25  Hospital Discharge Diagnosis:      #DKA  #DM Type 1 - uncontrolled with hyperglycemia   #Severe Acidosis  #Asthma  #HLD  #JOSUE       CHIEF COMPLAINT: Follow-up uncontrolled type 1 diabetes.     HPI: Today, patient is being seen for follow-up of uncontrolled DM type I.  He was to be checking his BS 3 times daily and was to bring log to follow-up today for review to see if any changes need to be made to his medications.  He had very limited BS readings and no decisions were able to be made about medications.  He was stressed to check BS 3 times daily before all meals and to set an alarm for himself to remind him to check his BS.  He was also asked to write down how much bolus/correctional insulin he is giving himself for review and is to return on  to review again.  He is to continue his NovoLog with sliding scale and correctional scale and Tresiba 25 units nightly.  No further questions at today's exam    Current Meds:  Current Outpatient Medications   Medication Sig Dispense Refill    insulin aspart (NOVOLOG FLEXPEN) 100 Units/mL Subcutaneous Solution Pen-injector Inject 1unit for every 10g of carbs plus a correction of 1unit for every 40 points greater than 140mg/dl.  Max daily dose of 35units. 15 mL 0    insulin degludec (TRESIBA FLEXTOUCH) 200 UNIT/ML Subcutaneous Solution Pen-injector Inject 25 Units into the skin nightly. 15 mL 0    atorvastatin (LIPITOR) 10 MG Oral Tab Take 1 tablet (10 mg total) by mouth daily. 90 tablet 1    Glucose Blood (ONETOUCH VERIO) In Vitro Strip Test blood sugar 3 times per day. 100 strip 6    glucagon (GVOKE HYPOPEN 2-PACK) 1 MG/0.2ML Subcutaneous SUBQ injection Inject 0.2 mL (1 mg total) into the skin as needed. 0.4 mL 1     No current facility-administered medications for this visit.        Lab Results   Component  Value Date/Time    WBC 5.5 03/31/2025 05:25 AM    HGB 13.5 03/31/2025 05:25 AM    HCT 37.4 (L) 03/31/2025 05:25 AM    .0 03/31/2025 05:25 AM     (H) 03/31/2025 05:25 AM     03/31/2025 05:25 AM    K 3.2 (L) 03/31/2025 05:25 AM    K 3.2 (L) 03/31/2025 05:25 AM     03/31/2025 05:25 AM    CO2 26.0 03/31/2025 05:25 AM    BUN 12 03/31/2025 05:25 AM    CREATSERUM 0.76 03/31/2025 05:25 AM    CA 8.6 (L) 03/31/2025 05:25 AM    MG 2.2 03/30/2025 05:06 AM    PHOS 3.8 03/31/2025 05:25 AM    ALB 5.4 (H) 03/28/2025 01:58 PM    ALT 24 03/28/2025 01:58 PM    AST 28 03/28/2025 01:58 PM    A1C 15.8 (H) 03/28/2025 01:58 PM       ROS:  GENERAL: energy stable, denies fever, weakness  SKIN: denies any skin changes  EYES: denies blurred vision, eye pain  HEENT: denies ear pain, loss of hearing, sore throat  RESPIRATORY: denies cough, denies dyspnea with exertion  CARDIOVASCULAR: denies syncope, chest pain, fatigue, + mild palpitations   GI: denies abdominal pain, melena, constipation, + diarrhea x 1, denies nausea, vomiting   MUSCULOSKELETAL: denies pain, states normal range of motion of extremities  NEUROLOGIC: denies confusion, balance difficulty  PSYCHIATRIC: denies depression or anxiety  HEMATOLOGIC: denies history of anemia, bruising, bleeding    PHYSICAL EXAM:  /78 (BP Location: Right arm, Patient Position: Sitting, Cuff Size: adult)   Pulse 100    GENERAL: well developed, well nourished, in no apparent distress, good historian  INTEGUMENTARY: warm, dry, no rashes  HEENT: atraumatic, normocephalic, sclera white, conjunctivae pink  NECK: supple  CHEST: no chest tenderness  LUNGS: clear to auscultation bilaterally, no wheezes, rhonchi or rales, normal respiratory effort  CARDIO: S1, S2, RRR without audible murmur, + tachycardia  GI: + BS to all quadrants, no tenderness  MUSCULOSKELETAL: + ROM in all joints, no evidence of swelling, pain   EXTREMITIES: no cyanosis, or edema  NEURO: Oriented  x3  PSYCHIATRIC: appropriate affect    IMPRESSION/ PLAN:   Diagnoses and all orders for this visit:    Type 1 diabetes mellitus with ketoacidosis without coma (HCC)  Was without insulin/rationing insulin secondary to loss of insurance due to loss of job-has started new job but insurance is not effective yet  Was treated in ICU for DKA with aggressive IV hydration, insulin drip, electrolyte replacement, and antiemetics as needed  Picked up short acting/long-acting insulin with coupon  Pharmacist counseled patient and provided savings card for long and short acting insulin for $35 a month-provided patient with funds to purchase insulin  He reports he has multiple meters at home to use and was instructed to start checking BS 3 times daily before meals and keep log and bring with to all follow-ups for review  Rx sent for/start  insulin aspart (NOVOLOG FLEXPEN) 100 Units/mL Subcutaneous Solution Pen-injector; Inject 1unit for every 10g of carbs plus a correction of 1unit for every 40 points greater than 140mg/dl.  Max daily dose of 35units.  Dispense: 15 mL; Refill: 0  insulin degludec (TRESIBA FLEXTOUCH) 200 UNIT/ML Subcutaneous Solution Pen-injector; Inject 25 Units into the skin nightly.  Dispense: 15 mL; Refill: 0  Stop  70/30 insulin  Patient is to follow-up with [facility] on 4/17 at 3:30 PM with BS log to make changes if needed to long-acting/short acting insulin  Has restarted eating carbs along with high-protein  Recommended lobes/fishes if patient needs food  Would benefit from DM education repeat in the future  Continue  Atorvastatin 10 mg nightly  Not on ACE/ARB  Follow-up with [facility] on 4/17 at 3:30 PM with BS log  Needs to establish with PCP-[facility] liaison to contact patient to help establish with PCP in 4 weeks-if he receives insurance before then our office can assist with setting up with an [place] PCP  Continue to monitor for S/S of hyper hypoglycemia  Further A1c monitoring per PCP  Unable to follow-up with  endocrinology at this time due to insurance and financial issues  All pertinent hospital records, labs, radiology from current hospitalization reviewed  HemoCue Glucose 201 (Glucose blood test)-in office today and BS was 281      Orders Placed This Encounter    HemoCue Glucose 201 (Glucose blood test)     Release to patient:   Immediate         Vaccines: N/A  Current Meds & Refills for this Visit:  Current Meds:  Medications Ordered Prior to this Encounter[1]      Refills:  Requested Prescriptions      No prescriptions requested or ordered in this encounter         Chronic Care Management Referral: N/A    Future Appointments   Date Time Provider Department Center   4/24/2025  3:30 PM Anila Iraheta APRN [facility]       Discharge Disposition:  1.  [facility] Follow-Up Visit: 4/24/2025  2.  Scheduled PCP Visit: Waiting for new insurance  3.  Home Health Care: N/AA      AMAYA Perez, 4/10/2025   [facility]  [phone]       [1]   Current Outpatient Medications on File Prior to Visit   Medication Sig    insulin aspart (NOVOLOG FLEXPEN) 100 Units/mL Subcutaneous Solution Pen-injector Inject 1unit for every 10g of carbs plus a correction of 1unit for every 40 points greater than 140mg/dl.  Max daily dose of 35units.    insulin degludec (TRESIBA FLEXTOUCH) 200 UNIT/ML Subcutaneous Solution Pen-injector Inject 25 Units into the skin nightly.    atorvastatin (LIPITOR) 10 MG Oral Tab Take 1 tablet (10 mg total) by mouth daily.    Glucose Blood (ONETOUCH VERIO) In Vitro Strip Test blood sugar 3 times per day.    glucagon (GVOKE HYPOPEN 2-PACK) 1 MG/0.2ML Subcutaneous SUBQ injection Inject 0.2 mL (1 mg total) into the skin as needed.     No current facility-administered medications on file prior to visit.

## 2025-04-24 ENCOUNTER — OFFICE VISIT (OUTPATIENT)
Dept: INTERNAL MEDICINE CLINIC | Facility: CLINIC | Age: 25
End: 2025-04-24

## 2025-04-24 VITALS
OXYGEN SATURATION: 99 % | DIASTOLIC BLOOD PRESSURE: 64 MMHG | SYSTOLIC BLOOD PRESSURE: 100 MMHG | RESPIRATION RATE: 15 BRPM | HEART RATE: 101 BPM | TEMPERATURE: 98 F

## 2025-04-24 DIAGNOSIS — E10.65 UNCONTROLLED TYPE 1 DIABETES MELLITUS WITH HYPERGLYCEMIA (HCC): Primary | ICD-10-CM

## 2025-04-24 NOTE — PROGRESS NOTES
TRANSITIONAL CARE CLINIC FOLLOW-UP VISIT     Yusuf Alberto MRN [id number]    2000 PCP Chiki Hernandez MD   Admit Date: 3/28/25  Discharge Date: 3/31/25  Hospital Discharge Diagnosis:      #DKA  #DM Type 1 - uncontrolled with hyperglycemia   #Severe Acidosis  #Asthma  #HLD  #JOSUE       CHIEF COMPLAINT: Follow-up uncontrolled type 1 diabetes.     HPI: Today, patient is being seen for follow-up of uncontrolled DM type I. He was to be checking his BS 3 times daily and was to bring log to follow-up today for review to see if any changes need to be made to his medications.  He again had a partial BS log at today's visit due to losing one of his sheets.  He has been setting an alarm to remind him to check his BS.  He was also asked to write down how much bolus/correctional insulin he is giving himself for review and is to return on  to review again.  He is noted with many highs and lows which makes medication adjustments hard to do at this time. He is to follow-up with Medicaid this week to see about approval or denial and if denied he is to contact his HR at work to look into getting insurance through his company as he needs to establish with endocrinology and would benefit from CGM and insulin pump for better control. He is to continue his NovoLog with sliding scale and correctional scale and Tresiba 25 units nightly.  No further questions at today's exam     Current Meds:  Current Outpatient Medications   Medication Sig Dispense Refill    insulin aspart (NOVOLOG FLEXPEN) 100 Units/mL Subcutaneous Solution Pen-injector Inject 1unit for every 10g of carbs plus a correction of 1unit for every 40 points greater than 140mg/dl.  Max daily dose of 35units. 15 mL 0    insulin degludec (TRESIBA FLEXTOUCH) 200 UNIT/ML Subcutaneous Solution Pen-injector Inject 25 Units into the skin nightly. 15 mL 0    atorvastatin (LIPITOR) 10 MG Oral Tab Take 1 tablet (10 mg total) by mouth daily. 90 tablet 1    Glucose Blood  (ONETOUCH VERIO) In Vitro Strip Test blood sugar 3 times per day. 100 strip 6    glucagon (GVOKE HYPOPEN 2-PACK) 1 MG/0.2ML Subcutaneous SUBQ injection Inject 0.2 mL (1 mg total) into the skin as needed. 0.4 mL 1     No current facility-administered medications for this visit.        Lab Results   Component Value Date/Time    WBC 5.5 03/31/2025 05:25 AM    HGB 13.5 03/31/2025 05:25 AM    HCT 37.4 (L) 03/31/2025 05:25 AM    .0 03/31/2025 05:25 AM     (H) 03/31/2025 05:25 AM     03/31/2025 05:25 AM    K 3.2 (L) 03/31/2025 05:25 AM    K 3.2 (L) 03/31/2025 05:25 AM     03/31/2025 05:25 AM    CO2 26.0 03/31/2025 05:25 AM    BUN 12 03/31/2025 05:25 AM    CREATSERUM 0.76 03/31/2025 05:25 AM    CA 8.6 (L) 03/31/2025 05:25 AM    MG 2.2 03/30/2025 05:06 AM    PHOS 3.8 03/31/2025 05:25 AM    ALB 5.4 (H) 03/28/2025 01:58 PM    ALT 24 03/28/2025 01:58 PM    AST 28 03/28/2025 01:58 PM    A1C 15.8 (H) 03/28/2025 01:58 PM       ROS:  GENERAL: energy stable, denies fever, weakness  SKIN: denies any skin changes  EYES: denies blurred vision, eye pain  HEENT: denies ear pain, loss of hearing, sore throat  RESPIRATORY: denies cough, denies dyspnea with exertion  CARDIOVASCULAR: denies syncope, chest pain, fatigue, + mild palpitations   GI: denies abdominal pain, melena, constipation, diarrhea, denies nausea, vomiting   MUSCULOSKELETAL: denies pain, states normal range of motion of extremities  NEUROLOGIC: denies confusion, balance difficulty  PSYCHIATRIC: denies depression or anxiety  HEMATOLOGIC: denies history of anemia, bruising, bleeding    PHYSICAL EXAM:  /64   Pulse 101   Temp 97.8 °F (36.6 °C)   Resp 15   SpO2 99%    GENERAL: well developed, well nourished, in no apparent distress, good historian  INTEGUMENTARY: warm, dry, no rashes  HEENT: atraumatic, normocephalic, sclera white, conjunctivae pink  NECK: supple  CHEST: no chest tenderness  LUNGS: clear to auscultation bilaterally, no wheezes,  rhonchi or rales, normal respiratory effort  CARDIO: S1, S2, RRR without audible murmur, + tachycardia  GI: + BS to all quadrants, no tenderness  MUSCULOSKELETAL: + ROM in all joints, no evidence of swelling, pain   EXTREMITIES: no cyanosis, or edema  NEURO: Oriented x3  PSYCHIATRIC: appropriate affect      IMPRESSION/ PLAN:   Diagnoses and all orders for this visit:    Uncontrolled type 1 diabetes mellitus with hyperglycemia (HCC)  Was without insulin/rationing insulin secondary to loss of insurance due to loss of job-has started new job but insurance is not effective yet-as of initial hospitalization  Was treated in ICU for DKA with aggressive IV hydration, insulin drip, electrolyte replacement, and antiemetics as needed-initial hospitalization  Picked up short acting/long-acting insulin with coupon-is good for 1 year and has Medicaid pending at this time  Pharmacist counseled patient and provided savings card for long and short acting insulin for $35 a month  He reports he has multiple meters at home to use and was instructed to start checking BS 3 times daily before meals and keep log and bring with to all follow-ups for review  BS log reviewed and in morning fasting is running [phone], before lunch is 138-194, and before dinner is [phone]-[id number] are from 4/20-4/24 as he lost the first page  Patient appears to be a brittle diabetic with significant highs and significant lows and is not likely following a diabetic diet very closely  Reviewed insulin amounts given and had long discussion with patient about carb correction and sliding scale  Still not able to make any medication changes as has had rapid swings in readings during the past week-discussed needs to establish with endocrinology and have CGM placed and started on an insulin pump for better coverage  Continue  insulin aspart (NOVOLOG FLEXPEN) 100 Units/mL Subcutaneous Solution Pen-injector; Inject 1unit for every 10g of carbs plus a correction of 1unit  for every 40 points greater than 140mg/dl.  Max daily dose of 35units.  Dispense: 15 mL; Refill: 0  insulin degludec (TRESIBA FLEXTOUCH) 200 UNIT/ML Subcutaneous Solution Pen-injector; Inject 25 Units into the skin nightly.  Dispense: 15 mL; Refill: 0  Stop  70/30 insulin  Patient is to follow-up with [facility] on 5/1 at 3:30 PM with BS log to make changes if needed to long-acting/short acting insulin  Has restarted eating carbs along with high-protein  Would benefit from DM education repeat in the future  Continue  Atorvastatin 10 mg nightly  Not on ACE/ARB  Follow-up with [facility] on 4/124 at 3:30 PM with BS log  Needs to establish with [facility]CP-[facility] liaison to contact patient to help establish with PCP in 4 weeks-if he receives insurance before then our office can assist with setting up with an [place] PCP  Continue to monitor for S/S of hyper hypoglycemia  Further A1c monitoring per PCP  Unable to follow-up with endocrinology at this time due to insurance and financial issues-patient is waiting for decision for Medicaid pending and was instructed if he receives any communication from Medicaid to bring with to next follow-up for review      No orders of the defined types were placed in this encounter.          Vaccines: N/A  Current Meds & Refills for this Visit:  Current Meds:  Medications Ordered Prior to this Encounter[1]      Refills:  Requested Prescriptions      No prescriptions requested or ordered in this encounter         Chronic Care Management Referral: N/A    Future Appointments   Date Time Provider Department Center   5/1/2025  3:30 PM Anila Iraheta APRN [facility]     Discharge Disposition:  1.  [facility] Follow-Up Visit: 5/1/2025  2.  Scheduled PCP Visit: Needs to establish with [facility]  3.  Home Health Care: N/A      AMAYA Perez, 4/23/2025   [facility]  [phone]         [1]   Current Outpatient Medications on File Prior to Visit   Medication Sig    insulin aspart (NOVOLOG FLEXPEN) 100  Units/mL Subcutaneous Solution Pen-injector Inject 1unit for every 10g of carbs plus a correction of 1unit for every 40 points greater than 140mg/dl.  Max daily dose of 35units.    insulin degludec (TRESIBA FLEXTOUCH) 200 UNIT/ML Subcutaneous Solution Pen-injector Inject 25 Units into the skin nightly.    atorvastatin (LIPITOR) 10 MG Oral Tab Take 1 tablet (10 mg total) by mouth daily.    Glucose Blood (ONETOUCH VERIO) In Vitro Strip Test blood sugar 3 times per day.    glucagon (GVOKE HYPOPEN 2-PACK) 1 MG/0.2ML Subcutaneous SUBQ injection Inject 0.2 mL (1 mg total) into the skin as needed.     No current facility-administered medications on file prior to visit.

## 2025-05-01 ENCOUNTER — OFFICE VISIT (OUTPATIENT)
Dept: INTERNAL MEDICINE CLINIC | Facility: CLINIC | Age: 25
End: 2025-05-01

## 2025-05-01 VITALS
OXYGEN SATURATION: 96 % | SYSTOLIC BLOOD PRESSURE: 112 MMHG | TEMPERATURE: 98 F | HEART RATE: 98 BPM | RESPIRATION RATE: 17 BRPM | DIASTOLIC BLOOD PRESSURE: 80 MMHG

## 2025-05-01 DIAGNOSIS — E10.65 UNCONTROLLED TYPE 1 DIABETES MELLITUS WITH HYPERGLYCEMIA (HCC): Primary | ICD-10-CM

## 2025-05-01 PROCEDURE — 99214 OFFICE O/P EST MOD 30 MIN: CPT | Performed by: NURSE PRACTITIONER

## 2025-05-05 NOTE — PROGRESS NOTES
TRANSITIONAL CARE CLINIC FOLLOW-UP VISIT     Yusuf Alberto MRN [id number]    2000 PCP Chiki Hernandez MD   Admit Date: 3/28/25  Discharge Date: 3/31/25  Hospital Discharge Diagnosis:      #DKA  #DM Type 1 - uncontrolled with hyperglycemia   #Severe Acidosis  #Asthma  #HLD  #JOSUE       CHIEF COMPLAINT Follow-up uncontrolled type 1 diabetes      HPI: Today, patient is being seen for follow-up of uncontrolled DM type I. He continues to be checking his BS 3 times daily and  bring log to follow-up today for review to see if any changes need to be made to his medications.  He has full BS log this week and again he has extreme highs and lows. He has been setting an alarm to remind him to check his BS.  He was also asked to write down how much bolus/correctional insulin he is giving himself for review today.  He is noted with many highs and lows which makes medication adjustments hard to do at this time. He followed up with Medicaid and has been denied. He is to contact his HR at work now to look into getting insurance through his company as he needs to establish with endocrinology and would benefit from CGM and insulin pump for better control. He is to continue his NovoLog with sliding scale and correctional scale and Tresiba 25 units nightly.  No further questions at today's exam     Current Meds:  Current Outpatient Medications   Medication Sig Dispense Refill    insulin aspart (NOVOLOG FLEXPEN) 100 Units/mL Subcutaneous Solution Pen-injector Inject 1unit for every 10g of carbs plus a correction of 1unit for every 40 points greater than 140mg/dl.  Max daily dose of 35units. 15 mL 0    insulin degludec (TRESIBA FLEXTOUCH) 200 UNIT/ML Subcutaneous Solution Pen-injector Inject 25 Units into the skin nightly. 15 mL 0    atorvastatin (LIPITOR) 10 MG Oral Tab Take 1 tablet (10 mg total) by mouth daily. 90 tablet 1    Glucose Blood (ONETOUCH VERIO) In Vitro Strip Test blood sugar 3 times per day. 100 strip 6     glucagon (GVOKE HYPOPEN 2-PACK) 1 MG/0.2ML Subcutaneous SUBQ injection Inject 0.2 mL (1 mg total) into the skin as needed. 0.4 mL 1     No current facility-administered medications for this visit.        Lab Results   Component Value Date/Time    WBC 5.5 03/31/2025 05:25 AM    HGB 13.5 03/31/2025 05:25 AM    HCT 37.4 (L) 03/31/2025 05:25 AM    .0 03/31/2025 05:25 AM     (H) 03/31/2025 05:25 AM     03/31/2025 05:25 AM    K 3.2 (L) 03/31/2025 05:25 AM    K 3.2 (L) 03/31/2025 05:25 AM     03/31/2025 05:25 AM    CO2 26.0 03/31/2025 05:25 AM    BUN 12 03/31/2025 05:25 AM    CREATSERUM 0.76 03/31/2025 05:25 AM    CA 8.6 (L) 03/31/2025 05:25 AM    MG 2.2 03/30/2025 05:06 AM    PHOS 3.8 03/31/2025 05:25 AM    ALB 5.4 (H) 03/28/2025 01:58 PM    ALT 24 03/28/2025 01:58 PM    AST 28 03/28/2025 01:58 PM    A1C 15.8 (H) 03/28/2025 01:58 PM       ROS:  GENERAL: energy stable, denies fever, weakness  SKIN: denies any skin changes  EYES: denies blurred vision, eye pain  HEENT: denies ear pain, loss of hearing, sore throat  RESPIRATORY: denies cough, denies dyspnea with exertion  CARDIOVASCULAR: denies syncope, chest pain, fatigue, + mild palpitations   GI: denies abdominal pain, melena, constipation, diarrhea, denies nausea, vomiting   MUSCULOSKELETAL: denies pain, states normal range of motion of extremities  NEUROLOGIC: denies confusion, balance difficulty  PSYCHIATRIC: denies depression or anxiety  HEMATOLOGIC: denies history of anemia, bruising, bleeding    PHYSICAL EXAM:  /80   Pulse 98   Temp 97.5 °F (36.4 °C)   Resp 17   SpO2 96%    GENERAL: well developed, well nourished, in no apparent distress, good historian  INTEGUMENTARY: warm, dry, no rashes  HEENT: atraumatic, normocephalic, sclera white, conjunctivae pink  NECK: supple  CHEST: no chest tenderness  LUNGS: clear to auscultation bilaterally, no wheezes, rhonchi or rales, normal respiratory effort  CARDIO: S1, S2, RRR without audible  murmur, + tachycardia  GI: + BS to all quadrants, no tenderness  MUSCULOSKELETAL: + ROM in all joints, no evidence of swelling, pain   EXTREMITIES: no cyanosis, or edema  NEURO: Oriented x3  PSYCHIATRIC: appropriate affect    IMPRESSION/ PLAN:   Diagnoses and all orders for this visit:    Uncontrolled type 1 diabetes mellitus with hyperglycemia (HCC)  Was without insulin/rationing insulin secondary to loss of insurance due to loss of job-has started new job but insurance is not effective yet-as of initial hospitalization  Was treated in ICU for DKA with aggressive IV hydration, insulin drip, electrolyte replacement, and antiemetics as needed-initial hospitalization  Picked up short acting/long-acting insulin with coupon-is good for 1 year and has Medicaid pending at this time  Pharmacist counseled patient and provided savings card for long and short acting insulin for $35 a month  He reports he has multiple meters at home to use and was instructed to start checking BS 3 times daily before meals and keep log and bring with to all follow-ups for review  BS log reviewed and in morning fasting is running [id number], before lunch is , and before dinner is    Patient appears to be a brittle diabetic with significant highs and significant lows and is not likely following a diabetic diet very closely  Reviewed insulin amounts given and had long discussion with patient about carb correction and sliding scale  Still not able to make any medication changes as has had rapid swings in readings during the past week-discussed needs to establish with endocrinology and have CGM placed and started on an insulin pump for better coverage  Continue  insulin aspart (NOVOLOG FLEXPEN) 100 Units/mL Subcutaneous Solution Pen-injector; Inject 1unit for every 10g of carbs plus a correction of 1unit for every 40 points greater than 140mg/dl.  Max daily dose of 35units.  Dispense: 15 mL; Refill: 0  insulin degludec (TRESIBA FLEXTOUCH)  200 UNIT/ML Subcutaneous Solution Pen-injector; Inject 25 Units into the skin nightly.  Dispense: 15 mL; Refill: 0  Stop  70/30 insulin  Patient is to follow-up with [facility] on 5/1 at 3:30 PM with BS log to make changes if needed to long-acting/short acting insulin  Has restarted eating carbs along with high-protein  Would benefit from DM education repeat in the future  Continue  Atorvastatin 10 mg nightly  Not on ACE/ARB  Needs to obtain insurance from his employer to establish with endocrinology  Needs to establish with [facility]CP-[facility] liaison to contact patient to help establish with PCP in 4 weeks-he was denied for Medicaid  Continue to monitor for S/S of hyper hypoglycemia  Further A1c monitoring per PCP  Unable to follow-up with endocrinology at this time due to insurance and financial issues-he was denied for Medicaid and is now contacting HR at his company to see if he can get insurance through his employer as he knows how important it is to establish with endocrinology  Follow-up with [facility] as directed      No orders of the defined types were placed in this encounter.          Vaccines: N/A  Current Meds & Refills for this Visit:  Current Meds:  Medications Ordered Prior to this Encounter[1]      Refills:  Requested Prescriptions      No prescriptions requested or ordered in this encounter         Chronic Care Management Referral: N/A    No future appointments.    Discharge Disposition:  1.  [facility] Follow-Up Visit: Discharge  2.  Scheduled PCP Visit: Follow-up with [facility] as directed  3.  Home Health Care: N/A      AMAYA Perez, 5/1/2025   [facility]  [phone]         [1]   Current Outpatient Medications on File Prior to Visit   Medication Sig    insulin aspart (NOVOLOG FLEXPEN) 100 Units/mL Subcutaneous Solution Pen-injector Inject 1unit for every 10g of carbs plus a correction of 1unit for every 40 points greater than 140mg/dl.  Max daily dose of 35units.    insulin degludec (TRESIBA FLEXTOUCH) 200  UNIT/ML Subcutaneous Solution Pen-injector Inject 25 Units into the skin nightly.    atorvastatin (LIPITOR) 10 MG Oral Tab Take 1 tablet (10 mg total) by mouth daily.    Glucose Blood (ONETOUCH VERIO) In Vitro Strip Test blood sugar 3 times per day.    glucagon (GVOKE HYPOPEN 2-PACK) 1 MG/0.2ML Subcutaneous SUBQ injection Inject 0.2 mL (1 mg total) into the skin as needed.     No current facility-administered medications on file prior to visit.

## (undated) NOTE — LETTER
Your patient was recently seen at the [facility] for a hospital follow-up visit. The visit note is attached. Please contact the clinic with any questions at [phone].    Thank you,  AMAYA Perez

## (undated) NOTE — LETTER
Your patient was recently seen at the [facility] for a hospital follow-up visit. The visit note is attached. Please contact the clinic with any questions at [phone].    Thank you,  AMAYA Perez

## (undated) NOTE — LETTER
11/21/2024    Yusuf Alberto  [id number] [facility] [place] [address]    Dear Yusuf,    We would like to inform you that your account has been charged $40 for not showing up to the office for your scheduled appointment on 11/21/2024.    Our no-show policy is as follows: A 24-hour notice is required, or you may be charged a $40 No Show fee.      If you are unable to keep your scheduled appointment, please notify us at least 24 hours in advance so we can accommodate our other patients. You may also reschedule your appointment at that time.    On the third no-show, within a 12-month period, it will be the physician’s discretion as to whether a discharge letter will be sent out disengaging you from the practice and giving you 30 days to enroll with a new non [facility] physician.    If you would like to contest this charge, please call [phone].    Sincerely,  [facility]

## (undated) NOTE — LETTER
Your patient was recently seen at the [facility] for a hospital follow-up visit. The visit note is attached. Please contact the clinic with any questions at [phone].    Thank you,  AMAYA Perez

## (undated) NOTE — Clinical Note
Date & Time: 3/31/2025, 1:03 PM  Patient: Yusuf Alberto  Encounter Provider(s):    Alex Juarez MD Sriaroon, Rey, MD Jung, Eric D, MD       To Whom It May Concern:    Yusuf Alberto was seen and treated in our department on 3/28/2025. He {Return to school/sport/work:[id number]}.    If you have any questions or concerns, please do not hesitate to call.        _____________________________  Physician/APC Signature

## (undated) NOTE — LETTER
Date & Time: 4/17/2022, 10:36 AM  Patient: Tristan Horton  Encounter Provider(s):    Christoph Banerjee PA-C       To Whom It May Concern:    Girish Salamanca was seen and treated in our department on 4/17/2022. He should not return to work until 4/19/22.     If you have any questions or concerns, please do not hesitate to call.        _____________________________  Physician/APC Signature

## (undated) NOTE — LETTER
Your patient was recently seen at the [facility] for a hospital follow-up visit. The visit note is attached. Please contact the clinic with any questions at [phone].    Thank you,  AMAYA Perez

## (undated) NOTE — LETTER
11/21/2024    Yusuf Alberto  [id number] [facility] [address]    Dear Yusuf,    We would like to inform you that your account has been charged $40 for not showing up to the office for your scheduled appointment on 11/21/2024.    Our no-show policy is as follows: A 24-hour notice is required, or you may be charged a $40 No Show fee.      If you are unable to keep your scheduled appointment, please notify us at least 24 hours in advance so we can accommodate our other patients. You may also reschedule your appointment at that time.    On the third no-show, within a 12-month period, it will be the physician’s discretion as to whether a discharge letter will be sent out disengaging you from the practice and giving you 30 days to enroll with a new non [facility] physician.    If you would like to contest this charge, please call [phone].    Sincerely,  [facility]

## (undated) NOTE — LETTER
07/03/20    Yusuf Sanz      To Whom It May Concern: This letter has been written at the patient's request. The above patient was seen at [facility] for treatment of a medical condition from 7/1/20 - 7/3/20.     The patient may return to work/sc

## (undated) NOTE — LETTER
Your patient was recently seen at the [facility] for a hospital follow-up visit. The visit note is attached. Please contact the clinic with any questions at [phone].    Thank you,  AMAYA Perez

## (undated) NOTE — ED AVS SNAPSHOT
Monika Dowell   MRN: [id number]    Department:  [facility] Emergency Department   Date of Visit:  5/31/2019           Disclosure     Insurance plans vary and the physician(s) referred by the ER may not be covered by your plan.  Please contact you tell this physician (or your personal doctor if your instructions are to return to your personal doctor) about any new or lasting problems. The primary care or specialist physician will see patients referred from the [facility] Emergency Department.  Jackson Davis

## (undated) NOTE — LETTER
March 31, 2025    Patient: Yusuf Alberto   Date of Visit: 3/28/2025       To Whom It May Concern:    Yusuf Alberto was seen and treated in our emergency department on 3/28/2025 and admitted through 3/31/2025. He can return to work.    If you have any questions or concerns, please don't hesitate to call.       Encounter Provider(s):    Alex Juarez MD Sriaroon, Rey, MD Jung, Eric D, MD

## (undated) NOTE — LETTER
[id number]  [address] [facility] [address]  [phone]     To whom it may concern,    Yusuf Alberto was hospitalized from 12/28/23-1/1/24. Yusuf may return to work on 1/4/24.    Thank You,